# Patient Record
Sex: FEMALE | Race: WHITE | NOT HISPANIC OR LATINO | Employment: FULL TIME | URBAN - METROPOLITAN AREA
[De-identification: names, ages, dates, MRNs, and addresses within clinical notes are randomized per-mention and may not be internally consistent; named-entity substitution may affect disease eponyms.]

---

## 2019-01-23 NOTE — PROGRESS NOTES
SUBJECTIVE:   Edda Schmidt is a 39 year old female who presents to clinic today for the following health issues:    Patient presents to clinic to Establish Care. Patient has had influenza vaccine through work at [employer].     History of Present Illness     Diet:  Regular (no restrictions)  Frequency of exercise:  4-5 days/week  Duration of exercise:  15-30 minutes  Taking medications regularly:  Yes  Medication side effects:  None  Additional concerns today:  Yes    Joint Pain    Onset: one month    Description:   Location: left knee  Character: Sharp, Dull ache and Stabbing    Intensity: moderate    Progression of Symptoms: worse    Accompanying Signs & Symptoms:  Other symptoms: none    History:   Previous similar pain: no       Precipitating factors:   Trauma or overuse: YES    Alleviating factors:  Improved by: rest/inactivity, heat, ice and NSAID -     Therapies Tried and outcome: NSAID, Heat, Rest. Nothing is helping.     Asthma  She typically does not take her Flovent daily, and has not taken her rescue inhaler in years. She really only has symptoms when it is cold and she is exercising.     Sciatica  Edda reports having mild left sciatica pain for which she takes flexeril.     Problem list and histories reviewed & adjusted, as indicated.  Additional history: as documented    Patient Active Problem List   Diagnosis     Asthma     Menstrual migraine     Left-sided low back pain with left-sided sciatica     Car sickness     No past surgical history on file.    Social History     Tobacco Use     Smoking status: Never Smoker     Smokeless tobacco: Never Used   Substance Use Topics     Alcohol use: Yes     Frequency: 2-3 times a week     Drinks per session: 1 or 2     Binge frequency: Never     No family history on file.        ROS:  Constitutional, HEENT, cardiovascular, pulmonary, GI, , musculoskeletal, neuro, skin, endocrine and psych systems are negative, except as in HPI or otherwise noted.     This  "document serves as a record of the services and decisions personally performed and made by Rosetta Nicole MD. It was created on her behalf by Pardeep Garcia, a trained medical scribe. The creation of this document is based the provider's statements to the medical scribe.  Pardeep Garcia, January 31, 2019 9:54 AM    OBJECTIVE:                                                    /68 (BP Location: Right arm, Patient Position: Chair, Cuff Size: Adult Large)   Pulse 52   Temp 97.8  F (36.6  C)   Resp 16   Ht 1.689 m (5' 6.5\")   Wt 103.4 kg (228 lb)   LMP 01/20/2019 (Exact Date)   SpO2 100%   Breastfeeding? No   BMI 36.25 kg/m    Body mass index is 36.25 kg/m .   GENERAL: healthy, alert, well nourished, well hydrated, no distress, obese  HENT: ear canals- normal; TMs- normal; Nose- normal; Mouth- no ulcers, no lesions  NECK: no tenderness, no adenopathy, no asymmetry, no masses, no stiffness; thyroid- normal to palpation  RESP: lungs clear to auscultation - no rales, no rhonchi, no wheezes  CV: regular rates and rhythm, normal S1 S2, no S3 or S4 and no murmur, no click or rub -  ABDOMEN: soft, no tenderness, no  hepatosplenomegaly, no masses, normal bowel sounds  SKIN: no suspicious lesions, no rashes to visible skin  PSYCH: Alert and oriented times 3; speech- coherent , normal rate and volume; able to articulate logical thoughts, able to abstract reason, no tangential thoughts, no hallucinations or delusions, affect- normal  : normal cervix, adnexae, and uterus without masses or discharge, weakened pelvic muscles noted  Left Knee Exam     Comments:  Lateral and medial grinding  Hamstring tightness        Diagnostic test results:  No results found for this or any previous visit (from the past 24 hour(s)).     ASSESSMENT/PLAN:                                                        ICD-10-CM    1. Encounter for routine adult health examination without abnormal findings Z00.00    2. Menstrual migraine without " status migrainosus, not intractable G43.829    3. Left-sided low back pain with left-sided sciatica, unspecified chronicity M54.42 cyclobenzaprine (FLEXERIL) 10 MG tablet   4. Car sickness, subsequent encounter T75.3XXD ondansetron (ZOFRAN) 8 MG tablet   5. Acute pain of left knee M25.562 XR Knee Left 1/2 Views     PHYSICAL THERAPY REFERRAL   6. Urge incontinence of urine N39.41 PHYSICAL THERAPY REFERRAL   7. Need for prophylactic vaccination and inoculation against influenza Z23    8. Need for vaccination Z23 TDAP VACCINE (ADACEL) [05475.002]   9. Screening for HIV (human immunodeficiency virus) Z11.4    10. Screening for malignant neoplasm of cervix Z12.4 HPV High Risk Types DNA Cervical     Pap imaged thin layer screen with HPV - recommended age 30 - 65 years (select HPV order below)     Joint Pain:  Referral given to PT to help strengthen hamstring and stabilize knee. X-ray obtained today which showed normal. Discussed weight loss methods and recommended low impact activities given knee pains, suggested forward and backwards elliptical exercises to strengthen hamstrings as well.     Urinary incontinence:  Referral given to PT to strengthen Pelvic muscles.     Sciatica:  Pt doing well on current medication and treatment plan. No change at this time. Refilled Flexeril prescription today.     Motion Sickness:  Pt doing well on current medication and treatment plan. No change at this time. Discussed methods to help manage motion sickness along with medication. Refilled prescriptions today.    Asthma:  Removed Flovent from medication list, as she has not used it and does not need it. She hasn't used any inhaler in years and is well controlled. Does not even want a rescue filled as she hasn't needed. Given ACT today to keep on hand for phone updates as needed for quality metrics.    Health Maintenance:  Papanicolaou smear performed with the patient's informed consent. Cervical swab was collected and sent to the lab  for stain analysis. The patient will be notified results of this test.    The information in this document, created by the medical scribe for me, accurately reflects the services I personally performed and the decisions made by me. I have reviewed and approved this document for accuracy.   MD Rosetta Sparks MD, MD  [facility]

## 2019-01-31 ENCOUNTER — OFFICE VISIT (OUTPATIENT)
Dept: FAMILY MEDICINE | Facility: OTHER | Age: 39
End: 2019-01-31
Payer: COMMERCIAL

## 2019-01-31 ENCOUNTER — ANCILLARY PROCEDURE (OUTPATIENT)
Dept: GENERAL RADIOLOGY | Facility: OTHER | Age: 39
End: 2019-01-31
Payer: COMMERCIAL

## 2019-01-31 ENCOUNTER — TELEPHONE (OUTPATIENT)
Dept: FAMILY MEDICINE | Facility: OTHER | Age: 39
End: 2019-01-31

## 2019-01-31 VITALS
WEIGHT: 228 LBS | SYSTOLIC BLOOD PRESSURE: 104 MMHG | RESPIRATION RATE: 16 BRPM | OXYGEN SATURATION: 100 % | HEART RATE: 52 BPM | DIASTOLIC BLOOD PRESSURE: 68 MMHG | TEMPERATURE: 97.8 F | BODY MASS INDEX: 35.79 KG/M2 | HEIGHT: 67 IN

## 2019-01-31 DIAGNOSIS — M54.42 LEFT-SIDED LOW BACK PAIN WITH LEFT-SIDED SCIATICA, UNSPECIFIED CHRONICITY: ICD-10-CM

## 2019-01-31 DIAGNOSIS — G43.829 MENSTRUAL MIGRAINE WITHOUT STATUS MIGRAINOSUS, NOT INTRACTABLE: ICD-10-CM

## 2019-01-31 DIAGNOSIS — Z00.00 ENCOUNTER FOR ROUTINE ADULT HEALTH EXAMINATION WITHOUT ABNORMAL FINDINGS: Primary | ICD-10-CM

## 2019-01-31 DIAGNOSIS — N39.41 URGE INCONTINENCE OF URINE: ICD-10-CM

## 2019-01-31 DIAGNOSIS — M25.562 ACUTE PAIN OF LEFT KNEE: ICD-10-CM

## 2019-01-31 DIAGNOSIS — T75.3XXD CAR SICKNESS, SUBSEQUENT ENCOUNTER: ICD-10-CM

## 2019-01-31 DIAGNOSIS — Z12.4 SCREENING FOR MALIGNANT NEOPLASM OF CERVIX: ICD-10-CM

## 2019-01-31 DIAGNOSIS — Z11.4 SCREENING FOR HIV (HUMAN IMMUNODEFICIENCY VIRUS): ICD-10-CM

## 2019-01-31 DIAGNOSIS — Z23 NEED FOR VACCINATION: ICD-10-CM

## 2019-01-31 DIAGNOSIS — Z23 NEED FOR PROPHYLACTIC VACCINATION AND INOCULATION AGAINST INFLUENZA: ICD-10-CM

## 2019-01-31 PROBLEM — T75.3XXA CAR SICKNESS: Status: ACTIVE | Noted: 2019-01-31

## 2019-01-31 PROCEDURE — G0145 SCR C/V CYTO,THINLAYER,RESCR: HCPCS | Performed by: FAMILY MEDICINE

## 2019-01-31 PROCEDURE — 99395 PREV VISIT EST AGE 18-39: CPT | Mod: 25 | Performed by: FAMILY MEDICINE

## 2019-01-31 PROCEDURE — 73560 X-RAY EXAM OF KNEE 1 OR 2: CPT | Mod: LT

## 2019-01-31 PROCEDURE — 90471 IMMUNIZATION ADMIN: CPT | Performed by: FAMILY MEDICINE

## 2019-01-31 PROCEDURE — 90715 TDAP VACCINE 7 YRS/> IM: CPT | Performed by: FAMILY MEDICINE

## 2019-01-31 PROCEDURE — 99213 OFFICE O/P EST LOW 20 MIN: CPT | Mod: 25 | Performed by: FAMILY MEDICINE

## 2019-01-31 PROCEDURE — 87624 HPV HI-RISK TYP POOLED RSLT: CPT | Performed by: FAMILY MEDICINE

## 2019-01-31 RX ORDER — CYCLOBENZAPRINE HCL 10 MG
10 TABLET ORAL PRN
COMMUNITY
Start: 2018-07-16 | End: 2019-01-31

## 2019-01-31 RX ORDER — FLUTICASONE PROPIONATE 220 UG/1
2 AEROSOL, METERED RESPIRATORY (INHALATION) PRN
COMMUNITY
Start: 2004-02-10 | End: 2019-01-31

## 2019-01-31 RX ORDER — BUTALBITAL, ACETAMINOPHEN AND CAFFEINE 50; 325; 40 MG/1; MG/1; MG/1
1 TABLET ORAL EVERY 6 HOURS PRN
COMMUNITY
Start: 2016-07-26 | End: 2021-03-18

## 2019-01-31 RX ORDER — ALBUTEROL SULFATE 90 UG/1
2 AEROSOL, METERED RESPIRATORY (INHALATION) PRN
COMMUNITY
Start: 2012-05-17 | End: 2019-04-03

## 2019-01-31 RX ORDER — ONDANSETRON 8 MG/1
8 TABLET, FILM COATED ORAL PRN
Qty: 30 TABLET | Refills: 1 | Status: SHIPPED | OUTPATIENT
Start: 2019-01-31 | End: 2019-02-04

## 2019-01-31 RX ORDER — CYCLOBENZAPRINE HCL 10 MG
10 TABLET ORAL PRN
Qty: 30 TABLET | Refills: 1 | Status: SHIPPED | OUTPATIENT
Start: 2019-01-31 | End: 2019-02-04

## 2019-01-31 RX ORDER — ONDANSETRON 8 MG/1
8 TABLET, FILM COATED ORAL PRN
COMMUNITY
Start: 2018-06-28 | End: 2019-01-31

## 2019-01-31 SDOH — HEALTH STABILITY: MENTAL HEALTH: HOW OFTEN DO YOU HAVE SIX OR MORE DRINKS ON ONE OCCASION?: NEVER

## 2019-01-31 SDOH — HEALTH STABILITY: MENTAL HEALTH: HOW MANY DRINKS CONTAINING ALCOHOL DO YOU HAVE ON A TYPICAL DAY WHEN YOU ARE DRINKING?: 1 OR 2

## 2019-01-31 SDOH — HEALTH STABILITY: MENTAL HEALTH: HOW OFTEN DO YOU HAVE A DRINK CONTAINING ALCOHOL?: 2-3 TIMES A WEEK

## 2019-01-31 SDOH — HEALTH STABILITY: MENTAL HEALTH: HOW MANY STANDARD DRINKS CONTAINING ALCOHOL DO YOU HAVE ON A TYPICAL DAY?: 1 OR 2

## 2019-01-31 SDOH — HEALTH STABILITY: MENTAL HEALTH: HOW OFTEN DO YOU HAVE 6 OR MORE DRINKS ON ONE OCCASION?: NEVER

## 2019-01-31 ASSESSMENT — MIFFLIN-ST. JEOR: SCORE: 1733.89

## 2019-01-31 NOTE — LETTER
February 7, 2019    Edda Schmidt  [id number] [address] [place] [id number]    Dear ,  This letter is regarding your recent Pap smear (cervical cancer screening) and Human Papillomavirus (HPV) test.  We are happy to inform you that your Pap smear result is normal. Cervical cancer is closely linked with certain types of HPV. Your results showed no evidence of high-risk HPV.  Therefore we recommend you return in 5 years for your next pap smear and HPV test.  You will still need to return to the clinic every year for an annual exam and other preventive tests.  If you have additional questions regarding this result, please call our registered nurse, Leatha at [phone].  Sincerely,    Rosetta Nicole MD/vivian

## 2019-02-01 ASSESSMENT — ASTHMA QUESTIONNAIRES: ACT_TOTALSCORE: 23

## 2019-02-04 LAB
COPATH REPORT: NORMAL
PAP: NORMAL

## 2019-02-04 RX ORDER — ONDANSETRON 8 MG/1
8 TABLET, FILM COATED ORAL EVERY 8 HOURS PRN
Qty: 30 TABLET | Refills: 1 | Status: SHIPPED | OUTPATIENT
Start: 2019-02-04 | End: 2019-11-27

## 2019-02-04 RX ORDER — CYCLOBENZAPRINE HCL 10 MG
10 TABLET ORAL 3 TIMES DAILY PRN
Qty: 30 TABLET | Refills: 1 | Status: SHIPPED | OUTPATIENT
Start: 2019-02-04 | End: 2019-11-27

## 2019-02-06 LAB
FINAL DIAGNOSIS: NORMAL
HPV HR 12 DNA CVX QL NAA+PROBE: NEGATIVE
HPV16 DNA SPEC QL NAA+PROBE: NEGATIVE
HPV18 DNA SPEC QL NAA+PROBE: NEGATIVE
SPECIMEN DESCRIPTION: NORMAL
SPECIMEN SOURCE CVX/VAG CYTO: NORMAL

## 2019-03-13 ENCOUNTER — TELEPHONE (OUTPATIENT)
Dept: FAMILY MEDICINE | Facility: OTHER | Age: 39
End: 2019-03-13

## 2019-03-13 NOTE — TELEPHONE ENCOUNTER
Will route to AE to review/ place order if appropriate.  Patient requesting an MRI prior to starting Physical Therapy.  Patient had an x-ray on 1/31/19- negative.  Chiqui Morataya CMA ([facility])

## 2019-03-13 NOTE — TELEPHONE ENCOUNTER
Patient informed. She had no questions at this time.   Cheri Cummings, [facility]

## 2019-03-13 NOTE — TELEPHONE ENCOUNTER
Reason for Call: Request for an order or referral:    Order or referral being requested: MRI of left knee    Date needed: as soon as possible    Has the patient been seen by the PCP for this problem? YES    Additional comments: patient would like to get this done before she starts PT    Phone number Patient can be reached at:  Cell number on file:    Telephone Information:   Mobile [phone]       Best Time:      Can we leave a detailed message on this number?  NO    Call taken on 3/13/2019 at 10:56 AM by Sheri Brito

## 2019-03-13 NOTE — TELEPHONE ENCOUNTER
Xray completed and MRI not indicated per exam.. She can get second opinion or come back to discuss if she would like.  Rosetta Nicole MD

## 2019-03-25 ENCOUNTER — HOSPITAL ENCOUNTER (OUTPATIENT)
Dept: PHYSICAL THERAPY | Facility: CLINIC | Age: 39
Setting detail: THERAPIES SERIES
End: 2019-03-25
Attending: FAMILY MEDICINE
Payer: COMMERCIAL

## 2019-03-25 PROCEDURE — 97161 PT EVAL LOW COMPLEX 20 MIN: CPT | Mod: GP | Performed by: PHYSICAL THERAPIST

## 2019-03-25 PROCEDURE — 97110 THERAPEUTIC EXERCISES: CPT | Mod: GP | Performed by: PHYSICAL THERAPIST

## 2019-03-25 NOTE — PROGRESS NOTES
" 03/25/19 1314   General Information   Type of Visit Initial OP Ortho PT Evaluation   Start of Care Date 03/25/19   Referring Physician Rosetta Nicole MD    Patient/Family Goals Statement The pt  would like to not have pain when riding, squatting, and bending down with working.    Orders Evaluate and Treat   Orders Comment L knee pain and hamstring strengthening needed   Date of Order 01/31/19   Insurance Type (PREFERRED ONE HMO)   Medical Diagnosis Acute pain of left knee M25.562   Surgical/Medical history reviewed Yes   Precautions/Limitations no known precautions/limitations   Weight-Bearing Status - LUE full weight-bearing   Weight-Bearing Status - RUE full weight-bearing   Weight-Bearing Status - LLE full weight-bearing   Weight-Bearing Status - RLE full weight-bearing   Special Instructions L knee pain and hamstring strengthening needed       Present No   Body Part(s)   Body Part(s) Knee   Presentation and Etiology   Pertinent history of current problem (include personal factors and/or comorbidities that impact the POC) 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 She brings herself up in the saddle frequently, which has been causing bilateral knee pain L>R. She reports her pain began approximately a year ago and has been getting worse with her longer trail rides. She lowered her stirrups where she does not have to bend her knees as much. About 20-25 miles into her ride she has pain with extending her knees bilaterally. Pain is right across the front of her knee. The last few months her left anteromedial left knee has been painful when she is squatting and bending her knee to end range. She rides horses 2-3 hours 2-3x a week. With every second step of the horse she will \"post\" as in raise her glutes off of saddle.  When riding her pain gets up to a 6-7/10. The longer the ride the more discomfort she has bilateral knees. Minimal pain with stairs, primarily with going down stairs. " Pt works as a nurse at [employer]. PMHx: L side sciatica, urinary incontinence, asthma   Impairments A. Pain;D. Decreased ROM;E. Decreased flexibility   Functional Limitations perform activities of daily living;perform required work activities   Symptom Location anteromedial left knee and bilateral anterior joint line.   How/Where did it occur With repetition/overuse   Onset date of current episode/exacerbation 03/25/18   Chronicity Chronic   Pain rating (0-10 point scale) Best (/10);Worst (/10);Other   Best (/10) 0/10   Worst (/10) 7/10   Pain rating comment current:1/10   Pain quality C. Aching;A. Sharp;B. Dull   Frequency of pain/symptoms C. With activity   Pain/symptoms are: (during activity)   Pain/symptoms exacerbated by (horse riding, squatting)   Pain/symptoms eased by E. Changing positions;J. Braces/supports;H. Cold;I. OTC medication(s)  (ibuprofen, Aleve)   Progression of symptoms since onset: Unchanged   Current / Previous Interventions   Diagnostic Tests: X-ray   X-ray Results Results   X-ray results unremarkable   Prior Level of Function   Prior Level of Function-Mobility IND   Prior Level of Function-ADLs IND   Current Level of Function   Current Community Support Family/friend caregiver   Patient role/employment history A. 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 No   Have you fallen and had an injury in the past year? No   Is patient a fall risk? No   Abuse Screen (yes response referral indicated)   Feels Unsafe at Home or Work/School no   System Outcome Measures   Outcome Measures (LEFS)   Knee Objective Findings   Side (if bilateral, select both right and left) Right;Left    Observation inferior and lateral tilted patellae bilaterally, slight bilateral knee valgus in standing   Posture ASIS and medial malleolus aligned in supine   Gait/Locomotion slightly decreased stance time on L LE   Knee/Hip Strength Comments hip flexion: 4+/5 B   Gretel's Test + on L, - on R   Knee Special Test Comments MARTIN and FADER + on L, L hip scour +   Palpation tenderness to palpation of B ITB, glute med, gastroc muscle bellies, lateral HS tendon attachments, and medial and lateral joint lines bilaterally.   Accessory Motion/Joint Mobility reduced medial patellar mobility bilaterally   Right Knee Extension AROM 15 degrees hyperextension   Right Knee Flexion AROM 120 degrees   Right Knee Flexion Strength 5/5   Right Knee Extension Strength 5/5   Right Hip Abduction Strength 4/5, pain    Right Quad Set Strength 5/5   R VMO Strength fair   Right Gastrocnemius Flexibility 20+ degrees DF    Right Hamstring Flexibility 90/90: 35 degrees form straight   Right Hip Flexor Flexibility demonstrates tightness   Right Quadricep Flexibility positive ely's   Right ITB Flexibility demonstrates tightness, positive ward's   Left Knee Extension AROM 121 degrees    Left Knee Flexion AROM 12 degrees hyperextension   Left Knee Flexion Strength 5/5   Left Knee Extension Strength 5/5   Left Hip Abduction Strength 4/5, pain    Left Quad Set Strength 5/5   L VMO Strength fair   Left Gastrocnemius Flexibility 20+ degrees DF    Left Hamstring Flexibility 90/90: 30 degrees form straight   Left Hip Flexor Flexibility demonstrates tightness   Left Quadricep Flexibility positive ely's   Left ITB Flexibility demonstrates tightness, positive ward's   Planned Therapy Interventions   Planned Therapy Interventions balance training;joint mobilization;gait training;manual therapy;neuromuscular re-education;ROM;strengthening;stretching   Planned Modality Interventions   Planned Modality Interventions Cryotherapy  (as needed for symptom  management)   Clinical Impression   Criteria for Skilled Therapeutic Interventions Met yes, treatment indicated   PT Diagnosis Pt presents with pain, decreased ROM, decreased strength, decreased flexibility, and impaired gait   Influenced by the following impairments pain, repetition/overuse, lateral and inferior tilted patellae B, slight knee valgus B   Functional limitations due to impairments squatting, stairs, riding horses   Clinical Presentation Stable/Uncomplicated   Clinical Presentation Rationale Pt is a 39 year old female that presents to physical therapy with muscle length and strength imbalance of bilateral lower extremities, and inferior/laterally tilted patellae, which is contributing to her knee pain. Pt demonstrates increased pain with squatting, navigating stairs, and with repetitive knee extensions movements while competitively riding horses. Pt's symptoms have been increasingly getting worse for over a year and will benefit from skilled physical therapy to address impairments listed above and to allow her to return to prior level of function with minimal pain.   Clinical Decision Making (Complexity) Low complexity   Therapy Frequency 1 time/week   Predicted Duration of Therapy Intervention (days/wks) 10 weeks   Risk & Benefits of therapy have been explained Yes   Patient, Family & other staff in agreement with plan of care Yes   Education Assessment   Preferred Learning Style Listening;Reading;Demonstration;Pictures/video   Barriers to Learning No barriers   ORTHO GOALS   PT Ortho Eval Goals 1;2;3   Ortho Goal 1   Goal Identifier LEFS   Goal Description Pt will score no greater than 34/80 on LEFS to demonstrate reduced pain and improved functional mobility.   Target Date 06/02/19   Ortho Goal 2   Goal Identifier pain   Goal Description Pt will be able to ride horses for 3 hours, squat, and navigate stairs with no greater than 3/10 discomfort in B LEs.   Target Date 06/02/19   Ortho Goal 3   Goal  Identifier HEP   Goal Description Pt will consistently perform HEP 4x a week to address pain, strength, flexibility, and ROM to allow her to return to prior level of function.   Target Date 06/02/19   Total Evaluation Time   PT Otoniel, Low Complexity Minutes ([id number]) 40     Thank you for your referral.    Payton Fam, PT, DTP  [facility]  [phone]

## 2019-03-29 ENCOUNTER — MYC MEDICAL ADVICE (OUTPATIENT)
Dept: FAMILY MEDICINE | Facility: OTHER | Age: 39
End: 2019-03-29

## 2019-03-29 DIAGNOSIS — M25.562 ACUTE PAIN OF LEFT KNEE: Primary | ICD-10-CM

## 2019-03-29 NOTE — TELEPHONE ENCOUNTER
Please call patient.     MRI would help to determine if there is truly a tear in the Mensicus or other damage.   If knee pain persisted would recommend further assessment and could include MRI.    Patient could also see Ortho for evaluation.     Funmi Hector PA-C

## 2019-03-29 NOTE — TELEPHONE ENCOUNTER
Attempted to contact patient - no answer/fast busy signal. [employer] message sent.  Cheri Cummings CMA

## 2019-03-29 NOTE — TELEPHONE ENCOUNTER
Per ES message I think she was recommending OV with FP or Ortho to evaluate if an MRI is needed. At this point I would recommend orthopedics.     SOURAV Beckham CNP

## 2019-03-29 NOTE — TELEPHONE ENCOUNTER
Please call patient on Monday and let her know we put in an ortho  and they will call her to schedule.     SOURAV Beckham CNP

## 2019-04-01 NOTE — TELEPHONE ENCOUNTER
Tried contacting patient received recording stating voicemail was full so unable to leave a message.

## 2019-04-01 NOTE — TELEPHONE ENCOUNTER
Pt returned call, states she would like to avoid additional clinic visits if possible. If FP deems MRI is appropriate, she would like to forego ortho. Please call to advise.

## 2019-04-01 NOTE — TELEPHONE ENCOUNTER
Please advise ortho appointment as KV and ES also advised this. MRI is very expensive and is considered advanced imaging. It is recommend she see a specialist (ortho) to determine if the MRI is even necessary.     Mekhi Gordon PA-C  [facility]

## 2019-04-02 ENCOUNTER — HOSPITAL ENCOUNTER (OUTPATIENT)
Dept: PHYSICAL THERAPY | Facility: CLINIC | Age: 39
Setting detail: THERAPIES SERIES
End: 2019-04-02
Attending: FAMILY MEDICINE
Payer: COMMERCIAL

## 2019-04-02 PROCEDURE — 97110 THERAPEUTIC EXERCISES: CPT | Mod: GP | Performed by: PHYSICAL THERAPIST

## 2019-04-03 ENCOUNTER — OFFICE VISIT (OUTPATIENT)
Dept: ORTHOPEDICS | Facility: CLINIC | Age: 39
End: 2019-04-03
Payer: COMMERCIAL

## 2019-04-03 VITALS
HEIGHT: 67 IN | DIASTOLIC BLOOD PRESSURE: 79 MMHG | BODY MASS INDEX: 35.79 KG/M2 | SYSTOLIC BLOOD PRESSURE: 121 MMHG | HEART RATE: 63 BPM | WEIGHT: 228 LBS

## 2019-04-03 DIAGNOSIS — M22.2X2 PATELLOFEMORAL PAIN SYNDROME OF BOTH KNEES: Primary | ICD-10-CM

## 2019-04-03 DIAGNOSIS — M22.2X1 PATELLOFEMORAL PAIN SYNDROME OF BOTH KNEES: Primary | ICD-10-CM

## 2019-04-03 PROCEDURE — 99214 OFFICE O/P EST MOD 30 MIN: CPT | Performed by: PREVENTIVE MEDICINE

## 2019-04-03 RX ORDER — DICLOFENAC SODIUM 75 MG/1
75 TABLET, DELAYED RELEASE ORAL 2 TIMES DAILY
Qty: 40 TABLET | Refills: 1 | Status: SHIPPED | OUTPATIENT
Start: 2019-04-03 | End: 2019-11-27

## 2019-04-03 ASSESSMENT — MIFFLIN-ST. JEOR: SCORE: 1733.89

## 2019-04-03 ASSESSMENT — PAIN SCALES - GENERAL: PAINLEVEL: NO PAIN (1)

## 2019-04-03 NOTE — NURSING NOTE
"NEW PATIENT INTAKE QUESTIONNAIRE  [facility] 4/3/2019      Eddapiyush Hummelspenser's chief complaint for this visit includes:  Chief Complaint   Patient presents with     Knee Pain     Left knee pain which started a couple of months ago.      PCP: No Ref-Primary, Physician    Referring Provider:  SOURAV [facility]  [address]  [place] [address]    /79   Pulse 63   Ht 1.689 m (5' 6.5\")   Wt 103.4 kg (228 lb)   BMI 36.25 kg/m    No Pain (1)       Reason for Visit:    What part of your body is injured / painful?  left knee    What caused the injury /pain? No inciting event     How long ago did your injury occur or pain begin? several months ago    What are your most bothersome symptoms? Pain    How would you characterize your symptom? dull    What makes your symptoms better? Rest    What makes your symptoms worse? Walking    Have you been previously seen for this problem? No    Medical History:    Medical History: See chart    Have you had surgery on this body part before? No    Medications: See chart    Allergies: No known drug allergies    Review of Systems:    Have you recently had a a fever, chills, weight loss? No    Do you have any vision problems? No    Do you have any chest pain or edema? No    Do you have any shortness of breath or wheezing?  No    Do you have stomach problems? No    Do you have any numbness or focal weakness? No    Do you have diabetes? No    Do you have problems with bleeding or clotting? No    Do you have an rashes or other skin lesions? No      "

## 2019-04-03 NOTE — PROGRESS NOTES
"HISTORY OF PRESENT ILLNESS  Ms. 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  Pain in knees is worse with more horseback riding  Location: bilateral knees  Quality:  achy pain    Severity: 7/10 at worst    Duration: 1 year  Timing: occurs intermittently  Context: occurs while exercising and lifting  Modifying factors:  resting and non-use makes it better, movement and use makes it worse  Associated signs & symptoms: swelling in knee at times  Previous similar pain: yes    Additional history: as documented    MEDICAL HISTORY  Patient Active Problem List   Diagnosis     Asthma     Menstrual migraine     Left-sided low back pain with left-sided sciatica     Car sickness     Cervical cancer screening       Current Outpatient Medications   Medication Sig Dispense Refill     butalbital-acetaminophen-caffeine (FIORICET/ESGIC) -40 MG tablet Take 1 tablet by mouth every 6 hours as needed       cyclobenzaprine (FLEXERIL) 10 MG tablet Take 1 tablet (10 mg) by mouth 3 times daily as needed for muscle spasms 30 tablet 1     ondansetron (ZOFRAN) 8 MG tablet Take 1 tablet (8 mg) by mouth every 8 hours as needed for nausea 30 tablet 1       No Known Allergies    No family history on file.    Additional medical/Social/Surgical histories reviewed in [employer] and updated as appropriate.     REVIEW OF SYSTEMS (4/3/2019)  10 point ROS of systems including Constitutional, Eyes, Respiratory, Cardiovascular, Gastroenterology, Genitourinary, Integumentary, Musculoskeletal, Psychiatric were all negative except for pertinent positives noted in my HPI.     PHYSICAL EXAM  Vitals:    04/03/19 1354   BP: 121/79   Pulse: 63   Weight: 103.4 kg (228 lb)   Height: 1.689 m (5' 6.5\")     Vital Signs: /79   Pulse 63   Ht 1.689 m (5' 6.5\")   Wt " 103.4 kg (228 lb)   BMI 36.25 kg/m   Patient declined being weighed. Body mass index is 36.25 kg/m .    General  - normal appearance, in no obvious distress  CV  - normal popliteal pulse  Pulm  - normal respiratory pattern, non-labored  Musculoskeletal -bilateral knee  - stance: normal gait without limp, no obvious leg length discrepancy, single-leg squat exhibits knee valgus, internal rotation of the hip, contralateral hip drop  - inspection: no swelling or effusion, normal muscle tone, normal bone and joint alignment, no obvious deformity  - palpation: no joint line tenderness, patellar tendon non-tender, tender medial patellar facet  - ROM: 135 degrees flexion, -5 degrees extension, not painful, crepitus with weight-bearing flexion  - strength: 5/5 in flexion, 5/5 in extension  - neuro: no sensory or motor deficit  - special tests:  (-) Lachman  (-) anterior drawer  (-) Gretel  (-) Thessaly  (-) varus at 0 and 30 degrees flexion  (-) valgus at 0 and 30 degrees flexion  (+) Zeke s compression test  (+) patellar grind  (-) patellar apprehension  Neuro  - no sensory or motor deficit, grossly normal coordination, normal muscle tone  Skin  - no ecchymosis, erythema, warmth, or induration, no obvious rash  Psych  - interactive, appropriate, normal mood and affect  Low bacK : has some left knee pain with extension, and stiffness in low back  Tight hamstrings worsen with flexion    ASSESSMENT & PLAN  38 yo female with lumbar radicular pain and bilateral PF arthritis pain  Reviewed left knee xray: shows arthritis medially and PF joint  Consider injections  Given HEP  Cont. PT  Start velma Briggs MD, CAQSM

## 2019-04-03 NOTE — PATIENT INSTRUCTIONS
Thanks for coming today.  Ortho/[facility] Medicine Clinic  [address] [place] [address]    To schedule future appointments in Ortho Clinic, you may call [phone].    To schedule ordered imaging by your provider:   Call Central Imaging [phone]    To schedule an injection ordered by your provider:  Call Central Imaging Injection scheduling line: [phone]  [employer]hart available online at:  [email or web address]    Please call if any further questions or concerns ([phone]).  Clinic hours 8 am to 5 pm.    Return to clinic (call) if symptoms worsen or fail to improve.

## 2019-04-03 NOTE — LETTER
4/3/2019         RE: Edda Schmidt  [id number] [address] [place] [address]        Dear Colleague,    Thank you for referring your patient, Edda Schmidt, to the [facility]. Please see a copy of my visit note below.    HISTORY OF PRESENT ILLNESS  Ms. 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  Pain in knees is worse with more horseback riding  Location: bilateral knees  Quality:  achy pain    Severity: 7/10 at worst    Duration: 1 year  Timing: occurs intermittently  Context: occurs while exercising and lifting  Modifying factors:  resting and non-use makes it better, movement and use makes it worse  Associated signs & symptoms: swelling in knee at times  Previous similar pain: yes    Additional history: as documented    MEDICAL HISTORY  Patient Active Problem List   Diagnosis     Asthma     Menstrual migraine     Left-sided low back pain with left-sided sciatica     Car sickness     Cervical cancer screening       Current Outpatient Medications   Medication Sig Dispense Refill     butalbital-acetaminophen-caffeine (FIORICET/ESGIC) -40 MG tablet Take 1 tablet by mouth every 6 hours as needed       cyclobenzaprine (FLEXERIL) 10 MG tablet Take 1 tablet (10 mg) by mouth 3 times daily as needed for muscle spasms 30 tablet 1     ondansetron (ZOFRAN) 8 MG tablet Take 1 tablet (8 mg) by mouth every 8 hours as needed for nausea 30 tablet 1       No Known Allergies    No family history on file.    Additional medical/Social/Surgical histories reviewed in [facility] and updated as appropriate.     REVIEW OF SYSTEMS (4/3/2019)  10 point ROS of systems including Constitutional, Eyes, Respiratory, Cardiovascular, Gastroenterology, Genitourinary, Integumentary, Musculoskeletal, Psychiatric were all negative except for  "pertinent positives noted in my HPI.     PHYSICAL EXAM  Vitals:    04/03/19 1354   BP: 121/79   Pulse: 63   Weight: 103.4 kg (228 lb)   Height: 1.689 m (5' 6.5\")     Vital Signs: /79   Pulse 63   Ht 1.689 m (5' 6.5\")   Wt 103.4 kg (228 lb)   BMI 36.25 kg/m    Patient declined being weighed. Body mass index is 36.25 kg/m .    General  - normal appearance, in no obvious distress  CV  - normal popliteal pulse  Pulm  - normal respiratory pattern, non-labored  Musculoskeletal -bilateral knee  - stance: normal gait without limp, no obvious leg length discrepancy, single-leg squat exhibits knee valgus, internal rotation of the hip, contralateral hip drop  - inspection: no swelling or effusion, normal muscle tone, normal bone and joint alignment, no obvious deformity  - palpation: no joint line tenderness, patellar tendon non-tender, tender medial patellar facet  - ROM: 135 degrees flexion, -5 degrees extension, not painful, crepitus with weight-bearing flexion  - strength: 5/5 in flexion, 5/5 in extension  - neuro: no sensory or motor deficit  - special tests:  (-) Lachman  (-) anterior drawer  (-) Gretel  (-) Thessaly  (-) varus at 0 and 30 degrees flexion  (-) valgus at 0 and 30 degrees flexion  (+) Zeke s compression test  (+) patellar grind  (-) patellar apprehension  Neuro  - no sensory or motor deficit, grossly normal coordination, normal muscle tone  Skin  - no ecchymosis, erythema, warmth, or induration, no obvious rash  Psych  - interactive, appropriate, normal mood and affect  Low bacK : has some left knee pain with extension, and stiffness in low back  Tight hamstrings worsen with flexion    ASSESSMENT & PLAN  40 yo female with lumbar radicular pain and bilateral PF arthritis pain  Reviewed left knee xray: shows arthritis medially and PF joint  Consider injections  Given HEP  Cont. PT  Start velma Briggs MD, CAQSM    Again, thank you for allowing me to participate in the care of " your patient.        Sincerely,        Nikhil Briggs MD

## 2019-04-09 DIAGNOSIS — M54.16 LUMBAR RADICULAR PAIN: Primary | ICD-10-CM

## 2019-04-11 ENCOUNTER — HOSPITAL ENCOUNTER (OUTPATIENT)
Dept: MRI IMAGING | Facility: CLINIC | Age: 39
Discharge: HOME | End: 2019-04-11
Attending: PREVENTIVE MEDICINE | Admitting: PREVENTIVE MEDICINE
Payer: COMMERCIAL

## 2019-04-11 DIAGNOSIS — M54.16 LUMBAR RADICULAR PAIN: ICD-10-CM

## 2019-04-11 PROCEDURE — 72148 MRI LUMBAR SPINE W/O DYE: CPT

## 2019-04-18 ENCOUNTER — TELEPHONE (OUTPATIENT)
Dept: SURGERY | Facility: CLINIC | Age: 39
End: 2019-04-18

## 2019-04-18 ENCOUNTER — MYC MEDICAL ADVICE (OUTPATIENT)
Dept: FAMILY MEDICINE | Facility: OTHER | Age: 39
End: 2019-04-18

## 2019-04-18 ENCOUNTER — OFFICE VISIT (OUTPATIENT)
Dept: ORTHOPEDICS | Facility: CLINIC | Age: 39
End: 2019-04-18
Payer: COMMERCIAL

## 2019-04-18 VITALS — DIASTOLIC BLOOD PRESSURE: 73 MMHG | SYSTOLIC BLOOD PRESSURE: 128 MMHG | OXYGEN SATURATION: 69 %

## 2019-04-18 DIAGNOSIS — M51.16 LUMBAR DISC HERNIATION WITH RADICULOPATHY: Primary | ICD-10-CM

## 2019-04-18 PROCEDURE — 99214 OFFICE O/P EST MOD 30 MIN: CPT | Performed by: PREVENTIVE MEDICINE

## 2019-04-18 RX ORDER — METHYLPREDNISOLONE 4 MG
TABLET, DOSE PACK ORAL
Qty: 21 TABLET | Refills: 0 | Status: SHIPPED | OUTPATIENT
Start: 2019-04-18 | End: 2019-04-25

## 2019-04-18 ASSESSMENT — PAIN SCALES - GENERAL: PAINLEVEL: NO PAIN (0)

## 2019-04-18 NOTE — PROGRESS NOTES
HISTORY OF PRESENT ILLNESS  Ms. Schmidt is a pleasant 39 year old year old female who presents to clinic today for followup for lumbar MRI  She is improving her low back pain but it is still present and her knees still ache a little but they are better  Cont. To do HEP  And PT  Taking voltaren and flexeril  Had an episode last week of severe pain shooting down left leg    MEDICAL HISTORY  Patient Active Problem List   Diagnosis     Asthma     Menstrual migraine     Left-sided low back pain with left-sided sciatica     Car sickness     Cervical cancer screening       Current Outpatient Medications   Medication Sig Dispense Refill     butalbital-acetaminophen-caffeine (FIORICET/ESGIC) -40 MG tablet Take 1 tablet by mouth every 6 hours as needed       cyclobenzaprine (FLEXERIL) 10 MG tablet Take 1 tablet (10 mg) by mouth 3 times daily as needed for muscle spasms 30 tablet 1     diclofenac (VOLTAREN) 75 MG EC tablet Take 1 tablet (75 mg) by mouth 2 times daily 40 tablet 1     ondansetron (ZOFRAN) 8 MG tablet Take 1 tablet (8 mg) by mouth every 8 hours as needed for nausea 30 tablet 1       No Known Allergies    No family history on file.    Additional medical/Social/Surgical histories reviewed in [employer] and updated as appropriate.     REVIEW OF SYSTEMS (4/18/2019)  10 point ROS of systems including Constitutional, Eyes, Respiratory, Cardiovascular, Gastroenterology, Genitourinary, Integumentary, Musculoskeletal, Psychiatric were all negative except for pertinent positives noted in my HPI.     PHYSICAL EXAM  Vitals:    04/18/19 1218   BP: 128/73   SpO2: (!) 69%     Vital Signs: /73   SpO2 (!) 69%  Patient declined being weighed. There is no height or weight on file to calculate BMI.    General  - normal appearance, in no obvious distress, overweight  CV  - normal peripheral perfusion  Pulm  - normal respiratory pattern, non-labored  Musculoskeletal - lumbar spine  - stance: normal gait without limp, no obvious  leg length discrepancy, normal heel and toe walk  - inspection: normal bone and joint alignment, no obvious scoliosis  - palpation: no paravertebral or bony tenderness  - ROM: flexion exacerbates pain, abnormal extension, sidebending, rotation with some low back discomfort  - strength: lower extremities 5/5 in all planes  - special tests:  (+) straight leg raise  (+) slump test  Neuro  - patellar and Achilles DTRs 2+ bilaterally, left lower extremity sensory deficit throughout L5 distribution, grossly normal coordination, normal muscle tone  Skin  - no ecchymosis, erythema, warmth, or induration, no obvious rash  Psych  - interactive, appropriate, normal mood and affect    ASSESSMENT & PLAN  38 yo female with lumbar disc herniation, and bilateral PF pain  Reviewed lumbar MRI; shows herniated disc at L4/5  Ordered BRONW  Medrol pack  Cont. PT and HEP  Lidocaine patches PRN  F/u after BROWN      Nikhil Briggs MD, CAQSM

## 2019-04-18 NOTE — TELEPHONE ENCOUNTER
Patient called to schedule BROWN, informed patient she would need a preop physical.  Pt became hesitant about having sedation and stated that she would need to think and about it and call back.

## 2019-04-18 NOTE — LETTER
4/18/2019         RE: Edda Schmidt  [address] [address] [place] [address]        Dear Colleague,    Thank you for referring your patient, Edda Schmidt, to the [facility]. Please see a copy of my visit note below.    HISTORY OF PRESENT ILLNESS  Ms. Schmidt is a pleasant 39 year old year old female who presents to clinic today for followup for lumbar MRI  She is improving her low back pain but it is still present and her knees still ache a little but they are better  Cont. To do HEP  And PT  Taking voltaren and flexeril  Had an episode last week of severe pain shooting down left leg    MEDICAL HISTORY  Patient Active Problem List   Diagnosis     Asthma     Menstrual migraine     Left-sided low back pain with left-sided sciatica     Car sickness     Cervical cancer screening       Current Outpatient Medications   Medication Sig Dispense Refill     butalbital-acetaminophen-caffeine (FIORICET/ESGIC) -40 MG tablet Take 1 tablet by mouth every 6 hours as needed       cyclobenzaprine (FLEXERIL) 10 MG tablet Take 1 tablet (10 mg) by mouth 3 times daily as needed for muscle spasms 30 tablet 1     diclofenac (VOLTAREN) 75 MG EC tablet Take 1 tablet (75 mg) by mouth 2 times daily 40 tablet 1     ondansetron (ZOFRAN) 8 MG tablet Take 1 tablet (8 mg) by mouth every 8 hours as needed for nausea 30 tablet 1       No Known Allergies    No family history on file.    Additional medical/Social/Surgical histories reviewed in [facility] and updated as appropriate.     REVIEW OF SYSTEMS (4/18/2019)  10 point ROS of systems including Constitutional, Eyes, Respiratory, Cardiovascular, Gastroenterology, Genitourinary, Integumentary, Musculoskeletal, Psychiatric were all negative except for pertinent positives noted in my HPI.     PHYSICAL EXAM  Vitals:    04/18/19 1218   BP: 128/73   SpO2: (!) 69%     Vital Signs: /73   SpO2 (!) 69%  Patient declined being weighed. There is no height or weight on file to calculate  BMI.    General  - normal appearance, in no obvious distress, overweight  CV  - normal peripheral perfusion  Pulm  - normal respiratory pattern, non-labored  Musculoskeletal - lumbar spine  - stance: normal gait without limp, no obvious leg length discrepancy, normal heel and toe walk  - inspection: normal bone and joint alignment, no obvious scoliosis  - palpation: no paravertebral or bony tenderness  - ROM: flexion exacerbates pain, abnormal extension, sidebending, rotation with some low back discomfort  - strength: lower extremities 5/5 in all planes  - special tests:  (+) straight leg raise  (+) slump test  Neuro  - patellar and Achilles DTRs 2+ bilaterally, left lower extremity sensory deficit throughout L5 distribution, grossly normal coordination, normal muscle tone  Skin  - no ecchymosis, erythema, warmth, or induration, no obvious rash  Psych  - interactive, appropriate, normal mood and affect    ASSESSMENT & PLAN  40 yo female with lumbar disc herniation, and bilateral PF pain  Reviewed lumbar MRI; shows herniated disc at L4/5  Ordered BROWN  Medrol pack  Cont. PT and HEP  Lidocaine patches PRN  F/u after BROWN      Nikhil Briggs MD, CAQSM    Again, thank you for allowing me to participate in the care of your patient.        Sincerely,        Nikhil Briggs MD

## 2019-04-18 NOTE — NURSING NOTE
Edda Schmidt's chief complaint for this visit includes:  Chief Complaint   Patient presents with     Lower Back - Follow Up     MR lumbar done 4/11/19     PCP: No Ref-Primary, Physician    Referring Provider:  No referring provider defined for this encounter.    /73   SpO2 (!) 69%   No Pain (0)     Do you need any medication refills at today's visit? n/a

## 2019-04-18 NOTE — PATIENT INSTRUCTIONS
Thanks for coming today.  Ortho[facility] Medicine Clinic  [address] [place] [address]    To schedule future appointments in Ortho Clinic, you may call [phone].    To schedule ordered imaging by your provider:   Call Central [facility] [phone]    To schedule an injection ordered by your provider:  Call [facility] Injection scheduling line: [phone]  Monitor Backlinkshart available online at:  [email or web address]    Please call if any further questions or concerns ([phone]).  Clinic hours 8 am to 5 pm.    Return to clinic (call) if symptoms worsen or fail to improve.

## 2019-04-19 NOTE — PROGRESS NOTES
[address]  [facility] [address]  [phone]  Dept: [phone]    PRE-OP EVALUATION:  Today's date: 2019    Edda Schmidt (: 1980) presents for pre-operative evaluation assessment as requested by Dr. Tanner .  She requires evaluation and anesthesia risk assessment prior to undergoing surgery/procedure for treatment of back .    Fax number for surgical facility: [facility]  Primary Physician: Rosetta Nicole  Type of Anesthesia Anticipated: Monitor care    Patient has a Health Care Directive or Living Will:  NO    Preop Questions 2019   What are you having done? Lumbar injection   Date of Surgery/Procedure: 19   Facility or Hospital where procedure/surgery will be performed: [facility]   1.  Do you have a history of Heart attack, stroke, stent, coronary bypass surgery, or other heart surgery? No   2.  Do you ever have any pain or discomfort in your chest? No   3.  Do you have a history of  Heart Failure? No   4.   Are you troubled by shortness of breath when:  walking on a level surface, or up a slight hill, or at night? No   5.  Do you currently have a cold, bronchitis or other respiratory infection? No   6.  Do you have a cough, shortness of breath, or wheezing? No   7.  Do you sometimes get pains in the calves of your legs when you walk? No   8. Do you or anyone in your family have previous history of blood clots? No   9.  Do you or does anyone in your family have a serious bleeding problem such as prolonged bleeding following surgeries or cuts? No   10. Have you ever had problems with anemia or been told to take iron pills? No   11. Have you had any abnormal blood loss such as black, tarry or bloody stools, or abnormal vaginal bleeding? No   12. Have you ever had a blood transfusion? No   13. Have you or any of your relatives ever had problems with anesthesia? No   14. Do you have sleep apnea, excessive snoring or daytime  drowsiness? No   15. Do you have any prosthetic heart valves? No   16. Do you have prosthetic joints? No   17. Is there any chance that you may be pregnant? No         HPI:     HPI related to upcoming procedure: Receiving lumbar 4-5 translaminar injection for left-sided low back pain with left-sided sciatica      See problem list for active medical problems.  Problems all longstanding and stable, except as noted/documented.  See ROS for pertinent symptoms related to these conditions.                                                                                                                                                          .    MEDICAL HISTORY:     Patient Active Problem List    Diagnosis Date Noted     Cervical cancer screening      Priority: Medium     5/22/13 NIL pap, neg HR HPV in Care Everywhere.   1/31/19 NIL pap, neg HR HPV. Plan: Cotest in 5 years.       Menstrual migraine 01/31/2019     Priority: Medium     Left-sided low back pain with left-sided sciatica 01/31/2019     Priority: Medium     Car sickness 01/31/2019     Priority: Medium     Asthma 05/12/2011     Priority: Medium     Overview:   Asthma  mild intermittant        No past medical history on file.  No past surgical history on file.  Current Outpatient Medications   Medication Sig Dispense Refill     butalbital-acetaminophen-caffeine (FIORICET/ESGIC) -40 MG tablet Take 1 tablet by mouth every 6 hours as needed       cyclobenzaprine (FLEXERIL) 10 MG tablet Take 1 tablet (10 mg) by mouth 3 times daily as needed for muscle spasms 30 tablet 1     diclofenac (VOLTAREN) 75 MG EC tablet Take 1 tablet (75 mg) by mouth 2 times daily 40 tablet 1     methylPREDNISolone (MEDROL DOSEPAK) 4 MG tablet therapy pack Follow Package Directions 21 tablet 0     ondansetron (ZOFRAN) 8 MG tablet Take 1 tablet (8 mg) by mouth every 8 hours as needed for nausea 30 tablet 1     OTC products: None, except as noted above    No Known Allergies   Latex  Allergy: NO    Social History     Tobacco Use     Smoking status: Never Smoker     Smokeless tobacco: Never Used   Substance Use Topics     Alcohol use: Yes     Frequency: 2-3 times a week     Drinks per session: 1 or 2     Binge frequency: Never     History   Drug Use No       REVIEW OF SYSTEMS:   Constitutional, neuro, ENT, endocrine, pulmonary, cardiac, gastrointestinal, genitourinary, musculoskeletal, integument and psychiatric systems are negative, except as otherwise noted.    EXAM:   There were no vitals taken for this visit.    GENERAL APPEARANCE: healthy, alert and no distress, obese     EYES: EOMI, PERRL     HENT: ear canals and TM's normal and nose and mouth without ulcers or lesions     NECK: no adenopathy, no asymmetry, masses, or scars and thyroid normal to palpation     RESP: lungs clear to auscultation - no rales, rhonchi or wheezes     CV: regular rates and rhythm, normal S1 S2, no S3 or S4 and no murmur, click or rub     ABDOMEN:  soft, nontender, no HSM or masses and bowel sounds normal     MS: extremities normal- no gross deformities noted, no evidence of inflammation in joints, FROM in all extremities.     SKIN: no suspicious lesions or rashes to visible skin     NEURO: Normal strength and tone, sensory exam grossly normal, mentation intact and speech normal     PSYCH: mentation appears normal. and affect normal/bright     LYMPHATICS: No cervical adenopathy    DIAGNOSTICS:   No labs or EKG required for low risk surgery (cataract, skin procedure, breast biopsy, etc)    No results for input(s): HGB, PLT, INR, NA, POTASSIUM, CR, A1C in the last 65122 hours.     IMPRESSION:   Reason for surgery/procedure: left-sided low back pain  Diagnosis/reason for consult: pre-op consultation    The proposed surgical procedure is considered LOW risk.    REVISED CARDIAC RISK INDEX  The patient has the following serious cardiovascular risks for perioperative complications such as (MI, PE, VFib and 3  AV  Block):  No serious cardiac risks  INTERPRETATION: 0 risks: Class I (very low risk - 0.4% complication rate)    The patient has the following additional risks for perioperative complications:  The ASCVD Risk score (Delisa SHAMIR Jr., et al., 2013) failed to calculate for the following reasons:    The 2013 ASCVD risk score is only valid for ages 40 to 79      ICD-10-CM    1. Preop general physical exam Z01.818    2. Screening for HIV (human immunodeficiency virus) Z11.4    3. Need for prophylactic vaccination and inoculation against influenza Z23        RECOMMENDATIONS:     --Patient is to take all scheduled medications on the day of surgery EXCEPT for modifications listed below.    Anticoagulant or Antiplatelet Medication Use  NSAIDS: Diclofenac (Voltaren):    Stop one day prior to surgery        APPROVAL GIVEN to proceed with proposed procedure, without further diagnostic evaluation     This document serves as a record of the services and decisions personally performed and made by Rosetta Nicole MD. It was created on her behalf by Pardeep Garcia, a trained medical scribe. The creation of this document is based the provider's statements to the medical scribe.  Pardeep Garcia, April 25, 2019 3:41 PM     The information in this document, created by the medical scribe for me, accurately reflects the services I personally performed and the decisions made by me. I have reviewed and approved this document for accuracy.   Rosetta Nicole MD      Signed Electronically by: Rosetta Nicole MD, MD    Copy of this evaluation report is provided to requesting physician.    [facility] Preop Guidelines    Revised Cardiac Risk Index

## 2019-04-25 ENCOUNTER — OFFICE VISIT (OUTPATIENT)
Dept: FAMILY MEDICINE | Facility: OTHER | Age: 39
End: 2019-04-25
Payer: COMMERCIAL

## 2019-04-25 VITALS
BODY MASS INDEX: 35.99 KG/M2 | TEMPERATURE: 100 F | OXYGEN SATURATION: 96 % | HEIGHT: 65 IN | WEIGHT: 216 LBS | SYSTOLIC BLOOD PRESSURE: 108 MMHG | HEART RATE: 69 BPM | DIASTOLIC BLOOD PRESSURE: 64 MMHG | RESPIRATION RATE: 16 BRPM

## 2019-04-25 DIAGNOSIS — J45.20 MILD INTERMITTENT ASTHMA WITHOUT COMPLICATION: ICD-10-CM

## 2019-04-25 DIAGNOSIS — Z11.4 SCREENING FOR HIV (HUMAN IMMUNODEFICIENCY VIRUS): ICD-10-CM

## 2019-04-25 DIAGNOSIS — Z01.818 PREOP GENERAL PHYSICAL EXAM: Primary | ICD-10-CM

## 2019-04-25 PROCEDURE — 99214 OFFICE O/P EST MOD 30 MIN: CPT | Performed by: FAMILY MEDICINE

## 2019-04-25 ASSESSMENT — MIFFLIN-ST. JEOR: SCORE: 1655.65

## 2019-04-26 ASSESSMENT — ASTHMA QUESTIONNAIRES: ACT_TOTALSCORE: 25

## 2019-05-09 ENCOUNTER — ANESTHESIA EVENT (OUTPATIENT)
Dept: SURGERY | Facility: CLINIC | Age: 39
End: 2019-05-09
Payer: COMMERCIAL

## 2019-05-10 ENCOUNTER — HOSPITAL ENCOUNTER (OUTPATIENT)
Dept: GENERAL RADIOLOGY | Facility: CLINIC | Age: 39
End: 2019-05-10
Attending: ANESTHESIOLOGY | Admitting: ANESTHESIOLOGY
Payer: COMMERCIAL

## 2019-05-10 ENCOUNTER — ANESTHESIA (OUTPATIENT)
Dept: SURGERY | Facility: CLINIC | Age: 39
End: 2019-05-10
Payer: COMMERCIAL

## 2019-05-10 ENCOUNTER — HOSPITAL ENCOUNTER (OUTPATIENT)
Facility: CLINIC | Age: 39
Discharge: HOME | End: 2019-05-10
Attending: ANESTHESIOLOGY | Admitting: ANESTHESIOLOGY
Payer: COMMERCIAL

## 2019-05-10 VITALS
OXYGEN SATURATION: 98 % | SYSTOLIC BLOOD PRESSURE: 114 MMHG | HEART RATE: 51 BPM | RESPIRATION RATE: 18 BRPM | DIASTOLIC BLOOD PRESSURE: 74 MMHG | TEMPERATURE: 98 F

## 2019-05-10 DIAGNOSIS — M51.26 LUMBAR DISC HERNIATION: ICD-10-CM

## 2019-05-10 LAB — HCG UR QL: NEGATIVE

## 2019-05-10 PROCEDURE — 37000008 ZZH ANESTHESIA TECHNICAL FEE, 1ST 30 MIN: Performed by: ANESTHESIOLOGY

## 2019-05-10 PROCEDURE — 25000128 H RX IP 250 OP 636: Performed by: NURSE ANESTHETIST, CERTIFIED REGISTERED

## 2019-05-10 PROCEDURE — 25000128 H RX IP 250 OP 636: Performed by: ANESTHESIOLOGY

## 2019-05-10 PROCEDURE — 25000125 ZZHC RX 250: Performed by: NURSE ANESTHETIST, CERTIFIED REGISTERED

## 2019-05-10 PROCEDURE — 40000277 XR SURGERY CARM FLUORO LESS THAN 5 MIN W STILLS: Mod: TC

## 2019-05-10 PROCEDURE — 62323 NJX INTERLAMINAR LMBR/SAC: CPT | Performed by: ANESTHESIOLOGY

## 2019-05-10 PROCEDURE — 81025 URINE PREGNANCY TEST: CPT | Performed by: ANESTHESIOLOGY

## 2019-05-10 RX ORDER — LIDOCAINE HYDROCHLORIDE 20 MG/ML
INJECTION, SOLUTION INFILTRATION; PERINEURAL PRN
Status: DISCONTINUED | OUTPATIENT
Start: 2019-05-10 | End: 2019-05-10

## 2019-05-10 RX ORDER — IOPAMIDOL 612 MG/ML
INJECTION, SOLUTION INTRATHECAL PRN
Status: DISCONTINUED | OUTPATIENT
Start: 2019-05-10 | End: 2019-05-10 | Stop reason: HOSPADM

## 2019-05-10 RX ORDER — TRIAMCINOLONE ACETONIDE 40 MG/ML
INJECTION, SUSPENSION INTRA-ARTICULAR; INTRAMUSCULAR PRN
Status: DISCONTINUED | OUTPATIENT
Start: 2019-05-10 | End: 2019-05-10 | Stop reason: HOSPADM

## 2019-05-10 RX ORDER — LIDOCAINE 40 MG/G
CREAM TOPICAL
Status: DISCONTINUED | OUTPATIENT
Start: 2019-05-10 | End: 2019-05-10 | Stop reason: HOSPADM

## 2019-05-10 RX ORDER — PROPOFOL 10 MG/ML
INJECTION, EMULSION INTRAVENOUS PRN
Status: DISCONTINUED | OUTPATIENT
Start: 2019-05-10 | End: 2019-05-10

## 2019-05-10 RX ADMIN — PROPOFOL 50 MG: 10 INJECTION, EMULSION INTRAVENOUS at 11:55

## 2019-05-10 RX ADMIN — PROPOFOL 30 MG: 10 INJECTION, EMULSION INTRAVENOUS at 11:58

## 2019-05-10 RX ADMIN — PROPOFOL 50 MG: 10 INJECTION, EMULSION INTRAVENOUS at 11:52

## 2019-05-10 RX ADMIN — PROPOFOL 50 MG: 10 INJECTION, EMULSION INTRAVENOUS at 11:53

## 2019-05-10 RX ADMIN — LIDOCAINE HYDROCHLORIDE 40 MG: 20 INJECTION, SOLUTION INFILTRATION; PERINEURAL at 11:51

## 2019-05-10 RX ADMIN — PROPOFOL 50 MG: 10 INJECTION, EMULSION INTRAVENOUS at 11:54

## 2019-05-10 RX ADMIN — LIDOCAINE HYDROCHLORIDE 1 ML: 10 INJECTION, SOLUTION EPIDURAL; INFILTRATION; INTRACAUDAL; PERINEURAL at 11:19

## 2019-05-10 NOTE — ANESTHESIA PREPROCEDURE EVALUATION
Anesthesia Pre-Procedure Evaluation    Patient: Edda Schmidt   MRN: [id number] : 1980          Preoperative Diagnosis: Lumbar disc herniation with radiculopathy    Procedure(s):  left sided Lumbar 4-5 translaminar injection    History reviewed. No pertinent past medical history.  History reviewed. No pertinent surgical history.    Anesthesia Evaluation     . Pt has not had prior anesthetic            ROS/MED HX    ENT/Pulmonary:     (+)Intermittent asthma Treatment: Inhaler prn,  , . .    Neurologic:  - neg neurologic ROS     Cardiovascular:  - neg cardiovascular ROS   (+) ----. : . . . :. . No previous cardiac testing       METS/Exercise Tolerance:     Hematologic:  - neg hematologic  ROS       Musculoskeletal:   (+)  other musculoskeletal- Left sided back pain      GI/Hepatic:  - neg GI/hepatic ROS       Renal/Genitourinary:  - ROS Renal section negative       Endo:  - neg endo ROS       Psychiatric:  - neg psychiatric ROS       Infectious Disease:  - neg infectious disease ROS       Malignancy:      - no malignancy   Other:    - neg other ROS                      Physical Exam  Normal systems: cardiovascular, pulmonary and dental    Airway   Mallampati: II  TM distance: >3 FB  Neck ROM: full    Dental     Cardiovascular   Rhythm and rate: regular and normal      Pulmonary    breath sounds clear to auscultation            No results found for: WBC, HGB, HCT, PLT, CRP, SED, NA, POTASSIUM, CHLORIDE, CO2, BUN, CR, GLC, RAINA, PHOS, MAG, ALBUMIN, PROTTOTAL, ALT, AST, GGT, ALKPHOS, BILITOTAL, BILIDIRECT, LIPASE, AMYLASE, MARY KAY, PTT, INR, FIBR, TSH, T4, T3, HCG, HCGS, CKTOTAL, CKMB, TROPN    Preop Vitals  BP Readings from Last 3 Encounters:   19 108/64   19 128/73   19 121/79    Pulse Readings from Last 3 Encounters:   19 69   19 63   19 52      Resp Readings from Last 3 Encounters:   19 16   19 16    SpO2 Readings from Last 3 Encounters:   19 96%   19  "(!) 69%   01/31/19 100%      Temp Readings from Last 1 Encounters:   04/25/19 100  F (37.8  C) (Temporal)    Ht Readings from Last 1 Encounters:   04/25/19 1.651 m (5' 5\")      Wt Readings from Last 1 Encounters:   04/25/19 98 kg (216 lb)    Estimated body mass index is 35.94 kg/m  as calculated from the following:    Height as of 4/25/19: 1.651 m (5' 5\").    Weight as of 4/25/19: 98 kg (216 lb).       Anesthesia Plan      History & Physical Review  History and physical reviewed and following examination; no interval change.    ASA Status:  2 .    NPO Status:  > 8 hours    Plan for MAC with Propofol induction. Reason for MAC:  Deep or markedly invasive procedure (G8)         Postoperative Care      Consents  Anesthetic plan, risks, benefits and alternatives discussed with:  Patient.  Use of blood products discussed: No .   .                 SOURAV Moreno CRNA  "

## 2019-05-10 NOTE — ANESTHESIA CARE TRANSFER NOTE
Patient: Edda Schmidt    Procedure(s):  left sided Lumbar 4-5 translaminar injection    Diagnosis: Lumbar disc herniation with radiculopathy  Diagnosis Additional Information: No value filed.    Anesthesia Type:   MAC     Note:  Anesthesia Care Transfer Notewriter    Vitals: (Last set prior to Anesthesia Care Transfer)    CRNA VITALS  5/10/2019 1133 - 5/10/2019 1233      5/10/2019             Pulse:  59    SpO2:  98 %    Resp Rate (observed):  17    EKG:  Sinus rhythm                Electronically Signed By: SOURAV Moreno CRNA  May 10, 2019  1:12 PM

## 2019-05-10 NOTE — ANESTHESIA POSTPROCEDURE EVALUATION
Patient: Edda Schmidt    Procedure(s):  left sided Lumbar 4-5 translaminar injection    Diagnosis:Lumbar disc herniation with radiculopathy  Diagnosis Additional Information: No value filed.    Anesthesia Type:  MAC    Note:  Anesthesia Post Evaluation    Patient location during evaluation: Phase 2 and Bedside  Patient participation: Able to fully participate in evaluation  Level of consciousness: awake  Pain management: adequate  Airway patency: patent  Cardiovascular status: acceptable and hemodynamically stable  Respiratory status: acceptable, room air and nonlabored ventilation  Hydration status: stable  PONV: none     Anesthetic complications: None    Comments: Patient was happy with the anesthesia care received and no anesthesia related complications were noted.  I will follow up with the patient again if it is needed.        Last vitals:  Vitals:    05/10/19 1118   BP: 115/64   Resp: 18   Temp: 98  F (36.7  C)         Electronically Signed By: SOURAV Moreno CRNA  May 10, 2019  12:10 PM

## 2019-05-10 NOTE — ANESTHESIA CARE TRANSFER NOTE
Patient: Edda Schmidt    Procedure(s):  left sided Lumbar 4-5 translaminar injection    Diagnosis: Lumbar disc herniation with radiculopathy  Diagnosis Additional Information: No value filed.    Anesthesia Type:   MAC     Note:  Airway :Room Air  Patient transferred to:Phase II  Handoff Report: Identifed the Patient, Identified the Reponsible Provider, Reviewed the pertinent medical history, Discussed the surgical course, Reviewed Intra-OP anesthesia mangement and issues during anesthesia, Set expectations for post-procedure period and Allowed opportunity for questions and acknowledgement of understanding      Vitals: (Last set prior to Anesthesia Care Transfer)    CRNA VITALS  5/10/2019 1133 - 5/10/2019 1209      5/10/2019             Pulse:  59    SpO2:  98 %    Resp Rate (observed):  17    EKG:  Sinus rhythm                Electronically Signed By: SOURAV Moreno CRNA  May 10, 2019  12:09 PM

## 2019-05-10 NOTE — DISCHARGE INSTRUCTIONS
Home Care Instructions                Procedure:  Epidural Steroid Injection or Joint injection    Activity:    Rest today    Do not work today    Resume normal activity tomorrow    Pain:    You may experience soreness at the injection site for one or two days    You may use an ice pack for 20 minutes every 2 hours for the first 24 hours    You may use a heating pad after the first 24 hours    You may use Tylenol  (acetaminophen) every 4 hours or other pain medicines as directed by your physician    Safety  Sedation medicine, if given may remain active for many hours.    It is important for the next 24 hours that you do not:    Drive a car    Operate machines or power tools    Consume alcohol, including beer    Sign any important papers or legal documents    You may experience numbness radiating into your legs or arms, (depending on the procedure location)  This numbness may last several hours.  Until the numb sensation returns to normal please use caution in walking, climbing stairs, stepping out of your vehicle, etc.    Common side effects of steroids:  Not everyone will experience corticosteroid side effects. If side effects are experienced they will gradually subside in the 7-10 day period following an injection.    Most common side effects include:    Flushed face and/or chest    Feeling of warmth, particularly in face but could be overall feeling of warmth    Increased blood sugar in diabetic patients    Menstrual irregularities may occur.  If taking hormone based birth control an alternate method of birth control is recommended    Sleep disturbances and/or mood swings are possible    Leg cramps    Please contact us if you have:  Severe pain   Fever more than 101.5 degrees Fahrenheit  Signs of infection (redness, swelling or drainage)      If you have questions during normal business hours (8am-5pm Monday-Friday) contact the [facility] at [phone]. If you need help after hours, we recommend that  you go to a hospital emergency room or dial 911.

## 2019-05-10 NOTE — OP NOTE
CHIEF COMPLAINT: Left sided low back and left-sided buttock pain  PROCEDURE: L4-5 interlaminar epidural steroid injection using fluoroscopic guidance with contrast dye.   PROCEDURE DETAILS: After written informed consent was obtained from the patient, the patient was escorted to the procedure room.  The patient was placed in the prone position.  A  time out  was conducted to verify patient identity, procedure to be performed, side, site, allergies and any special requirements.  The skin over the neck and upper back region were prepped and draped in normal sterile fashion. Fluoroscopy was used to identify the interspace in an AP view and the skin was anesthetized with 2 mL of 1% lidocaine with bicarbonate buffer.  A 20-gauge 3-1/2 inch Tuohy needle was advanced using the loss of resistance technique with preservative free normal saline with fluoroscopic guidance. After negative aspiration for CSF and blood, 1.5 cc of Omnipaque contrast dye was injected revealing the appropriate epidurogram without evidence of intrathecal or intravascular spread. Following this, a 3-mL solution of 40 mg of triamcinolone with 2 cc preservative-free normal saline was slowly injected.  After injection of the medication, as the needle tip was withdrawn, it was flushed with local anesthetic.  The patient was monitored with blood pressure and pulse oximetry machines with the assistance of an RN throughout the procedure.  The patient was alert and responsive to questions throughout the procedure.   The patient tolerated the procedure well and was observed in the post-procedural area.  The patient was dismissed without apparent complications.     DIAGNOSIS:  1.  L4-5 disc herniation causing left-sided buttock pain and back pain.     2.  Lumbar spondylosis causing a possible facet syndrome  PLAN:  1. Performed a L4-5 interlaminar epidural steroid injection.   2. The patient was instructed to follow-up at the [place] [facility] if today's  procedure is not helpful.  Looking at the big picture in regards to a diagnostic work-up, if today's injection is not helpful I would recommend diagnostically screening her facet joints by doing a diagnostic injection with local anesthetic and corticosteroid into her left L4-5 and L5-S1 facet joints.  Her physical exam does not point to SI joint dysfunction and her MRI really is not all that impressive for any significant foraminal stenosis.  Of course she could be having internal disc disruption causing discogenic referred pain to the left buttock and low back.  Nonetheless her MRI is not impressive.  I do think her facet joints are high in the differential for pain generators.  Jordy Tanner MD  Diplomate of the American Board of Anesthesiology, Pain Medicine

## 2019-05-20 NOTE — PROGRESS NOTES
Outpatient Physical Therapy Discharge Note     Patient: Edda Schmidt    : 1980    Beginning/End Dates of Reporting Period: 3/25/2019 to 2019    Referring Provider: Bonnie Eller Diagnosis:  Pt presents with pain, decreased ROM, decreased strength, decreased flexibility, and impaired gait     Client Self Report: Riding a bit more and L knee is gradually getting worse, hurting more with normal things.  Xrays were normal.  Could do better about getting in HS stretching at work.  Way too tight in ITB to start foam roller.    Objective Measurements: Pt was seen for one visit after the evaluation.  See eval for more details.     Goals:  Goal Identifier LEFS   Goal Description Pt will score no greater than 34/80 on LEFS to demonstrate reduced pain and improved functional mobility.   Target Date 19   Date Met      Progress:     Goal Identifier pain   Goal Description Pt will be able to ride horses for 3 hours, squat, and navigate stairs with no greater than 3/10 discomfort in B LEs.   Target Date 19   Date Met      Progress:     Goal Identifier HEP   Goal Description Pt will consistently perform HEP 4x a week to address pain, strength, flexibility, and ROM to allow her to return to prior level of funciton.   Target Date 19   Date Met      Progress:       Progress Toward Goals: At last visit, goals in progress, tighter L > R ITB, weaker L > R glut meds/hip abductors.  Will discharge from PT as chart review indicates pt had a lumbar injection and she has not since returned to PT.    Plan: Discharge from therapy.    Reason for Discharge: Patient chooses to discontinue therapy.    Equipment Issued: none    Discharge Plan: Patient to continue home program.

## 2019-05-20 NOTE — ADDENDUM NOTE
Encounter addended by: Pippa Montilla, PT on: 5/20/2019 9:11 AM   Actions taken: Episode resolved, Pend clinical note, Sign clinical note

## 2019-05-24 ENCOUNTER — THERAPY VISIT (OUTPATIENT)
Dept: CHIROPRACTIC MEDICINE | Facility: CLINIC | Age: 39
End: 2019-05-24
Payer: COMMERCIAL

## 2019-05-24 DIAGNOSIS — M54.9 MECHANICAL BACK PAIN: ICD-10-CM

## 2019-05-24 DIAGNOSIS — M99.02 SEGMENTAL DYSFUNCTION OF THORACIC REGION: ICD-10-CM

## 2019-05-24 DIAGNOSIS — M99.01 SEGMENTAL DYSFUNCTION OF CERVICAL REGION: Primary | ICD-10-CM

## 2019-05-24 DIAGNOSIS — M99.04 SEGMENTAL DYSFUNCTION OF SACRAL REGION: ICD-10-CM

## 2019-05-24 PROCEDURE — 97035 APP MDLTY 1+ULTRASOUND EA 15: CPT | Performed by: CHIROPRACTOR

## 2019-05-24 PROCEDURE — 99203 OFFICE O/P NEW LOW 30 MIN: CPT | Mod: 25 | Performed by: CHIROPRACTOR

## 2019-05-24 PROCEDURE — 98941 CHIROPRACT MANJ 3-4 REGIONS: CPT | Mod: AT | Performed by: CHIROPRACTOR

## 2019-05-24 NOTE — PROGRESS NOTES
Chiropractic Clinic Visit    PCP: Rosetta Nicole    Edda Schmidt is a 39 year old female who is seen as a self referral presenting with neck pain that she woke up with about a week ago, and it just won't go away. She points to pain on the left side. She states that she sleeps in an awkward position because of her cats. She denies radiation of symptoms. She does have headaches once per week. The pain is rated 2/10, described as sharp with rotation, but now pain with neutral position. She is sleeping okay at night. She is not using ice or heat, but uses Voltarin pain gel for her arthritis. She has been to a chiropractor in the past, about 10 years ago. Patient also notes that she has had some left-sided lower back/SI pain which has been ongoing for years, and she has seen a chiropractor for int he past.       Injury: None    Location of Pain: left cervical spine pain in left suboccipitals   Duration of Pain: 1 week(s)  Rating of Pain at worst: 4/10  Rating of Pain Currently: 2/10  Symptoms are better with: Other medications: Voltarin  Symptoms are worse with: rotation  Additional Features: RN in [employer]       Health History  as reported by the patient:    How does the patient rate their own health:   Good    Current or past medical history:   Asthma - exercise, History of fractures - jaw 1987, Migraines/headaches, Osteoarthritis - left knee, L3-4 injection, Overweight, Pain at night/rest and Smoking    Medical allergies:  None known    Past Traumas/Surgeries:  Jaw repair, appendectomy, tonsillectomy,      Family History:  Cardiac, HTN, DM2, cancer    Medications:  Anti-inflammatory and Muscle relaxants    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       Review of Systems  Musculoskeletal: as above  Remainder of review of systems is  negative including constitutional, CV, pulmonary, GI, Skin and Neurologic except as noted in HPI or medical history.    Past Medical History:   Diagnosis Date     Motion sickness      Past Surgical History:   Procedure Laterality Date     INJECT EPIDURAL LUMBAR Left 5/10/2019    Procedure: left sided Lumbar 4-5 translaminar injection;  Surgeon: Jordy Tanner MD;  Location: [facility] OR       Objective  There were no vitals taken for this visit.    GENERAL APPEARANCE: healthy, alert and no distress   GAIT: NORMAL  SKIN: no suspicious lesions or rashes  NEURO: Normal strength and tone, mentation intact and speech normal  PSYCH:  mentation appears normal and affect normal/bright    Edda was asked to complete the Neck Disability Index, today in the office. NDI Disability score: 12%; pain severity scale: 2/10.    Cervical Spine Exam    Range of Motion:         LR and LLF mildly reduced and RR moderately reduced    Inspection:         No visible deformity        normal lordotic curvature maintained    Tender:        Left suboccipitals      Muscle strength:       C5 (shoulder abduction) symmetric 5/5 Normal       C6 (elbow flexion) symmetric 5/5 Normal       C7 (elbow extension) symmetric 5/5 Normal       C8 (finger abduction, thumb flexion) symmetric 5/5 Normal    Reflexes:        C5 (biceps) symmetric 2 bilaterally       C6 (supinator) symmetric 2 bilaterally       C7 (triceps) symmetric 2 bilaterally    Sensation:       grossly intact througout bilateral upper extremities    Special Tests:       neg (-) Spurling  Jose Daniel's- negative, Distraction - negative and Shoulder depression - Right negative and Left positive    Lymphatics:        no edema noted in the upper extremities       Segmental spinal dysfunction/restrictions found at:  C2 , C5 , T1 , T5 , T9  and PSIS Left         Muscle spasm found in:Sub-occipital and Traps      Radiology:  None warranted at this time, consider if no improvement with conservative  management.     Assessment:    No diagnosis found.    RX ordered/plan of care: Mechanical neck pain likely attributed by degenerative changes and poor sleep position, with associated myospasm and intersegmental dysfunction.   Anticipated outcomes: Patient is expected to get relief with care.   Possible risks and side effects: Minimal soreness expected post-adjustment.     After discussing the risk and benefits of care, patient consented to treatment.    Patient's condition:  Patient had restrictions pre-manipulation and Patient had decreased motion prior to manipulation    Treatment effectiveness:  Post manipulation there is better intersegmental movement and Patient claims to feel looser post manipulation    Plan:    Procedures:    Evaluation and Management:  74012 Moderate level exam 30 min    CMT:  25344 Chiropractic manipulative treatment 3-4 regions performed   Cervical: Diversified, C2, C5 , Supine  Thoracic: Diversified, T1, T5, T9, Prone  Pelvis: Drop Table, PSIS Left , Prone    Modalities:  58633: US:  1.0 [place]/cm squared for 8 minutes at 1.0mhz  Continuous  pulsed, Location: left suboccipitals    Therapeutic procedures:  Gave patient Ice instructions post adjustment, and instructions for acute care    Response to Treatment:  Patient tolerated treatment well today.     Prognosis: Good      Treatment plan and goals:  Goals:  Decrease pain in left cervical spine.  Increase CAROM.  Return to pain-free ADLs.    Frequency of care  Duration of care is estimated to be 4 weeks, from the initial treatment.  It is estimated that the patient will need a total of 6 visits to resolve this episode.  For the initial therapeutic trial of care, the frequency is recommended at 1-2 times per week.  A reevaluation would be clinically appropriate in 6 visits, to determine progress and further course of care.    In-Office Treatment  Evaluation  Spinal Chiropractic Manipulative Therapy:  Trial of care - re-evaluate after 6  visits.       Recommendations:    Instructions:  ice 20 minutes every other hour as needed and heat 15 minutes every other hour as needed    Follow-up:  Return to care next week.     Disclaimer: This note consists of symbols derived from keyboarding, dictation and/or voice recognition software. As a result, there may be errors in the script that have gone undetected. Please consider this when interpreting information found in this chart.

## 2019-05-31 ENCOUNTER — THERAPY VISIT (OUTPATIENT)
Dept: CHIROPRACTIC MEDICINE | Facility: CLINIC | Age: 39
End: 2019-05-31
Payer: COMMERCIAL

## 2019-05-31 DIAGNOSIS — M99.01 SEGMENTAL DYSFUNCTION OF CERVICAL REGION: ICD-10-CM

## 2019-05-31 DIAGNOSIS — M54.9 MECHANICAL BACK PAIN: ICD-10-CM

## 2019-05-31 DIAGNOSIS — M99.02 SEGMENTAL DYSFUNCTION OF THORACIC REGION: ICD-10-CM

## 2019-05-31 DIAGNOSIS — M99.04 SEGMENTAL DYSFUNCTION OF SACRAL REGION: Primary | ICD-10-CM

## 2019-05-31 PROCEDURE — 98941 CHIROPRACT MANJ 3-4 REGIONS: CPT | Mod: AT | Performed by: CHIROPRACTOR

## 2019-05-31 NOTE — PROGRESS NOTES
Visit #:  2 of 8 based on treatment plan 5/24/2019    Subjective:  Edda Schmidt is a 39 year old female who is seen in f/u up for:        Segmental dysfunction of cervical region  Segmental dysfunction of thoracic region  Segmental dysfunction of sacral region  Mechanical back pain.     Since last visit on 5/24/2019,  Edda Schmidt reports the following changes: Patient presents and states that she was a little sore after her adjustment, got some relief for most of the week, now she is starting to feel sore again as of last night. She has been doing a lot of digging in the dirt which contributed to her pain. She feels pain on the left side of her lower back. Her neck has been feeling really good. She rates her current pain 1/10 today.      Objective:  The following was observed:    P: palpatory tenderness    A: static palpation demonstrates intersegmental asymmetry     R: motion palpation notes restricted motion    T: localized muscle spasm at: Gluteal, Lumbar erector spine and Piriformis L>>R      Assessment:    Segmental spinal dysfunction/restrictions found at:  C2   C5   T1   T5  T10  L4  PSIS Left    Diagnoses:      1. Segmental dysfunction of cervical region    2. Segmental dysfunction of thoracic region    3. Segmental dysfunction of sacral region    4. Mechanical back pain        Patient's condition:  Patient had restrictions pre-manipulation    Treatment effectiveness:  Post manipulation there is better intersegmental movement and Patient claims to feel looser post manipulation      Procedures:  CMT:  95938 Chiropractic manipulative treatment 3-4 regions performed   Cervical: Diversified, C2, C5 , Supine  Thoracic: Diversified, T1, T5, T10, Prone  Lumbar: Diversified and Drop Table, L4, Prone, Side posture  Pelvis: Diversified and Drop Table, PSIS Left , Prone, Side posture    Modalities:  04581: MSTM:  To Gluteal, Lumbar erector spine and Piriformis  for 5 min    Therapeutic procedures:  Gave patient  Ice instructions post adjustment, and instructions for acute care      Prognosis: Good    Progress towards Goals:   Decrease pain in left cervical spine.  Increase CAROM.  Return to pain-free ADLs.      Response to Treatment:   Reduction of symptoms with care, cervical spine pain has resolved, left lower back is more of the issue now.       Recommendations:    Instructions:ice 20 minutes every other hour as needed    Follow-up:  Return to care next week. Patient referred for massage at [facility].

## 2019-06-05 ENCOUNTER — THERAPY VISIT (OUTPATIENT)
Dept: CHIROPRACTIC MEDICINE | Facility: CLINIC | Age: 39
End: 2019-06-05
Payer: COMMERCIAL

## 2019-06-05 DIAGNOSIS — M99.01 SEGMENTAL DYSFUNCTION OF CERVICAL REGION: ICD-10-CM

## 2019-06-05 DIAGNOSIS — M99.02 SEGMENTAL DYSFUNCTION OF THORACIC REGION: ICD-10-CM

## 2019-06-05 DIAGNOSIS — M54.9 MECHANICAL BACK PAIN: ICD-10-CM

## 2019-06-05 DIAGNOSIS — M99.04 SEGMENTAL DYSFUNCTION OF SACRAL REGION: Primary | ICD-10-CM

## 2019-06-05 PROCEDURE — 98941 CHIROPRACT MANJ 3-4 REGIONS: CPT | Mod: AT | Performed by: CHIROPRACTOR

## 2019-06-05 NOTE — PROGRESS NOTES
Visit #:  3 of 8 based on treatment plan 5/24/2019    Subjective:  Edda Schmidt is a 39 year old female who is seen in f/u up for:        Segmental dysfunction of cervical region  Segmental dysfunction of thoracic region  Segmental dysfunction of sacral region  Mechanical back pain.     Since last visit on 5/31/2019,  Edda Schmidt reports the following changes: Patient presents and states that she just got off work and is ready for bed. She did 25 miles on Saturday and her back was really sore, but it is feeling better. She currently doesn't have pain.        Objective:  The following was observed:    P: palpatory tenderness    A: static palpation demonstrates intersegmental asymmetry     R: motion palpation notes restricted motion    T: localized muscle spasm at: Gluteal, Lumbar erector spine and Piriformis L>>R      Assessment:    Segmental spinal dysfunction/restrictions found at:  C2 RR, LRR  C5 LR, RRR  T1 RR, LRR  T5 E, FR  T10 E, FR  L4 RR, LRR  Left SI posterior    Diagnoses:      1. Segmental dysfunction of cervical region    2. Segmental dysfunction of thoracic region    3. Segmental dysfunction of sacral region    4. Mechanical back pain        Patient's condition:  Patient had restrictions pre-manipulation    Treatment effectiveness:  Post manipulation there is better intersegmental movement and Patient claims to feel looser post manipulation      Procedures:  CMT:  00422 Chiropractic manipulative treatment 3-4 regions performed   Cervical: Diversified, C2, C5 , Supine  Thoracic: Diversified, T1, T5, T10, Prone  Lumbar: Diversified and Drop Table, L4, Prone, Side posture  Pelvis: Diversified and Drop Table, PSIS Left , Prone, Side posture    Modalities:  36066: MSTM:  To Gluteal, Lumbar erector spine and Piriformis  for 5 min    Therapeutic procedures:  Gave patient Ice instructions post adjustment, and instructions for acute care      Prognosis: Good    Progress towards Goals:   Decrease pain in  left cervical spine.  Increase CAROM.  Return to pain-free ADLs.      Response to Treatment:   Reduction of symptoms with care, cervical spine pain has resolved, left lower back is more of the issue now.       Recommendations:    Instructions:ice 20 minutes every other hour as needed    Follow-up:  Return to care next week. Patient referred for massage at [facility].

## 2019-10-14 ENCOUNTER — OFFICE VISIT (OUTPATIENT)
Dept: FAMILY MEDICINE | Facility: OTHER | Age: 39
End: 2019-10-14
Payer: COMMERCIAL

## 2019-10-14 VITALS
HEIGHT: 65 IN | WEIGHT: 220 LBS | BODY MASS INDEX: 36.65 KG/M2 | HEART RATE: 68 BPM | DIASTOLIC BLOOD PRESSURE: 70 MMHG | SYSTOLIC BLOOD PRESSURE: 118 MMHG | TEMPERATURE: 98.9 F | OXYGEN SATURATION: 98 % | RESPIRATION RATE: 16 BRPM

## 2019-10-14 DIAGNOSIS — N63.21 BREAST LUMP ON LEFT SIDE AT 1 O'CLOCK POSITION: Primary | ICD-10-CM

## 2019-10-14 DIAGNOSIS — J45.20 MILD INTERMITTENT ASTHMA WITHOUT COMPLICATION: ICD-10-CM

## 2019-10-14 DIAGNOSIS — Z23 NEED FOR PROPHYLACTIC VACCINATION AND INOCULATION AGAINST INFLUENZA: ICD-10-CM

## 2019-10-14 PROCEDURE — 90471 IMMUNIZATION ADMIN: CPT | Performed by: FAMILY MEDICINE

## 2019-10-14 PROCEDURE — 99213 OFFICE O/P EST LOW 20 MIN: CPT | Mod: 25 | Performed by: FAMILY MEDICINE

## 2019-10-14 PROCEDURE — 90686 IIV4 VACC NO PRSV 0.5 ML IM: CPT | Performed by: FAMILY MEDICINE

## 2019-10-14 ASSESSMENT — ASTHMA QUESTIONNAIRES
ACT_TOTALSCORE: 24
QUESTION_5 LAST FOUR WEEKS HOW WOULD YOU RATE YOUR ASTHMA CONTROL: COMPLETELY CONTROLLED
QUESTION_2 LAST FOUR WEEKS HOW OFTEN HAVE YOU HAD SHORTNESS OF BREATH: ONCE OR TWICE A WEEK
QUESTION_4 LAST FOUR WEEKS HOW OFTEN HAVE YOU USED YOUR RESCUE INHALER OR NEBULIZER MEDICATION (SUCH AS ALBUTEROL): NOT AT ALL
QUESTION_3 LAST FOUR WEEKS HOW OFTEN DID YOUR ASTHMA SYMPTOMS (WHEEZING, COUGHING, SHORTNESS OF BREATH, CHEST TIGHTNESS OR PAIN) WAKE YOU UP AT NIGHT OR EARLIER THAN USUAL IN THE MORNING: NOT AT ALL
QUESTION_1 LAST FOUR WEEKS HOW MUCH OF THE TIME DID YOUR ASTHMA KEEP YOU FROM GETTING AS MUCH DONE AT WORK, SCHOOL OR AT HOME: NONE OF THE TIME

## 2019-10-14 ASSESSMENT — MIFFLIN-ST. JEOR: SCORE: 1673.79

## 2019-10-14 NOTE — PROGRESS NOTES
Subjective     Edda Schmidt is a 39 year old female who presents to clinic today for the following health issues:    HPI   Concern - Breast lump (left)  Onset: One month     Description:   Patient found lump in left breast about one month ago, states is is mobile. Painful at times.     Intensity: mild    Progression of Symptoms:  Worsening    Accompanying Signs & Symptoms:  May have increased in size    Previous history of similar problem:   Had a cyst in 2016 in the left breast. Went away in its own.     Precipitating factors:   Worsened by: NA    Alleviating factors:  Improved by: NA    Therapies Tried and outcome: NA  -------------------------------------    Patient Active Problem List   Diagnosis     Asthma     Menstrual migraine     Left-sided low back pain with left-sided sciatica     Car sickness     Segmental dysfunction of cervical region     Segmental dysfunction of thoracic region     Segmental dysfunction of sacral region     Mechanical back pain     Past Surgical History:   Procedure Laterality Date     INJECT EPIDURAL LUMBAR Left 5/10/2019    Procedure: left sided Lumbar 4-5 translaminar injection;  Surgeon: Jordy Tanner MD;  Location:  OR       Social History     Tobacco Use     Smoking status: Never Smoker     Smokeless tobacco: Never Used   Substance Use Topics     Alcohol use: Yes     Frequency: 2-3 times a week     Drinks per session: 1 or 2     Binge frequency: Never     History reviewed. No pertinent family history.        Reviewed and updated as needed this visit by Provider  Tobacco  Allergies  Meds  Problems  Med Hx  Surg Hx  Fam Hx         Review of Systems   ROS COMP: Constitutional, HEENT, cardiovascular, pulmonary, GI, , musculoskeletal, neuro, skin, endocrine and psych systems are negative, except as otherwise noted.      Objective    /70 (BP Location: Left arm, Patient Position: Chair, Cuff Size: Adult Regular)   Pulse 68   Temp 98.9  F (37.2  C) (Temporal)    "Resp 16   Ht 1.651 m (5' 5\")   Wt 99.8 kg (220 lb)   SpO2 98%   Breastfeeding? No   BMI 36.61 kg/m    Body mass index is 36.61 kg/m .  Physical Exam   GENERAL: healthy, alert and no distress  RESP: lungs clear to auscultation - no rales, rhonchi or wheezes  BREAST: normal without masses, tenderness or nipple discharge and no palpable axillary masses or adenopathy  CV: regular rate and rhythm, normal S1 S2, no S3 or S4, no murmur, click or rub, no peripheral edema and peripheral pulses strong  SKIN: no suspicious lesions or rashes  NEURO: Normal strength and tone, mentation intact and speech normal  PSYCH: mentation appears normal, affect normal/bright            Assessment & Plan       ICD-10-CM    1. Breast lump on left side at 1 o'clock position N63.21 US Breast Left Complete 4 Quadrants     MA Diagnostic Digital Left   2. Need for prophylactic vaccination and inoculation against influenza Z23 INFLUENZA VACCINE IM > 6 MONTHS VALENT IIV4 [28866]     Vaccine Administration, Initial [54163]   3. Mild intermittent asthma without complication J45.20      Reassured that lump is mobile, but since it has been present more than 1 month, should be evaluated.   Updated ACT and has not been using inhalers, so looks good otherwise.       BMI:   Estimated body mass index is 36.61 kg/m  as calculated from the following:    Height as of this encounter: 1.651 m (5' 5\").    Weight as of this encounter: 99.8 kg (220 lb).   Weight management plan: Discussed healthy diet and exercise guidelines      Return in about 6 months (around 4/14/2020) for asthma.    Rosetta Nicole MD, MD  [facility]    "

## 2019-10-15 ASSESSMENT — ASTHMA QUESTIONNAIRES: ACT_TOTALSCORE: 24

## 2019-10-16 ENCOUNTER — ANCILLARY PROCEDURE (OUTPATIENT)
Dept: ULTRASOUND IMAGING | Facility: CLINIC | Age: 39
End: 2019-10-16
Attending: FAMILY MEDICINE
Payer: COMMERCIAL

## 2019-10-16 ENCOUNTER — ANCILLARY PROCEDURE (OUTPATIENT)
Dept: MAMMOGRAPHY | Facility: CLINIC | Age: 39
End: 2019-10-16
Attending: FAMILY MEDICINE
Payer: COMMERCIAL

## 2019-10-16 DIAGNOSIS — N63.21 BREAST LUMP ON LEFT SIDE AT 1 O'CLOCK POSITION: ICD-10-CM

## 2019-10-16 PROCEDURE — 76642 ULTRASOUND BREAST LIMITED: CPT | Mod: LT

## 2019-10-16 PROCEDURE — G0279 TOMOSYNTHESIS, MAMMO: HCPCS

## 2019-10-16 PROCEDURE — 77066 DX MAMMO INCL CAD BI: CPT

## 2019-10-16 NOTE — RESULT ENCOUNTER NOTE
Results look great. Nice and reassuring.  Please let me know if you have any questions.    Rosetta Nicole MD

## 2019-11-27 ENCOUNTER — MYC REFILL (OUTPATIENT)
Dept: ORTHOPEDICS | Facility: CLINIC | Age: 39
End: 2019-11-27

## 2019-11-27 ENCOUNTER — MYC REFILL (OUTPATIENT)
Dept: FAMILY MEDICINE | Facility: OTHER | Age: 39
End: 2019-11-27

## 2019-11-27 DIAGNOSIS — M54.42 LEFT-SIDED LOW BACK PAIN WITH LEFT-SIDED SCIATICA, UNSPECIFIED CHRONICITY: ICD-10-CM

## 2019-11-27 DIAGNOSIS — T75.3XXD CAR SICKNESS, SUBSEQUENT ENCOUNTER: ICD-10-CM

## 2019-11-27 DIAGNOSIS — M22.2X2 PATELLOFEMORAL PAIN SYNDROME OF BOTH KNEES: ICD-10-CM

## 2019-11-27 DIAGNOSIS — M22.2X1 PATELLOFEMORAL PAIN SYNDROME OF BOTH KNEES: ICD-10-CM

## 2019-11-27 RX ORDER — CYCLOBENZAPRINE HCL 10 MG
10 TABLET ORAL 3 TIMES DAILY PRN
Qty: 30 TABLET | Refills: 0 | Status: SHIPPED | OUTPATIENT
Start: 2019-11-27 | End: 2020-04-23

## 2019-11-27 RX ORDER — ONDANSETRON 8 MG/1
8 TABLET, FILM COATED ORAL EVERY 8 HOURS PRN
Qty: 30 TABLET | Refills: 1 | Status: SHIPPED | OUTPATIENT
Start: 2019-11-27 | End: 2020-04-23

## 2019-11-27 NOTE — TELEPHONE ENCOUNTER
Pending Prescriptions:                       Disp   Refills    cyclobenzaprine (FLEXERIL) 10 MG tablet   30 tab*1            Sig: Take 1 tablet (10 mg) by mouth 3 times daily as           needed for muscle spasms        Last Written Prescription Date:  2/4/19  Last Fill Quantity: 30,   # refills: 1  Last Office Visit: 10/14/19  Future Office visit:       Routing refill request to provider for review/approval because:  Drug not on the [facility] or [facility] refill protocol or controlled substance          ondansetron (ZOFRAN) 8 MG tablet          30 tab*1            Sig: Take 1 tablet (8 mg) by mouth every 8 hours as           needed for nausea  Prescription approved per [facility] Refill Protocol.    Jenny Marroquin, RN, BSN

## 2019-11-29 RX ORDER — DICLOFENAC SODIUM 75 MG/1
75 TABLET, DELAYED RELEASE ORAL 2 TIMES DAILY
Qty: 40 TABLET | Refills: 1 | Status: SHIPPED | OUTPATIENT
Start: 2019-11-29 | End: 2020-09-20

## 2019-12-06 ENCOUNTER — THERAPY VISIT (OUTPATIENT)
Dept: CHIROPRACTIC MEDICINE | Facility: CLINIC | Age: 39
End: 2019-12-06
Payer: COMMERCIAL

## 2019-12-06 DIAGNOSIS — M99.02 SEGMENTAL DYSFUNCTION OF THORACIC REGION: ICD-10-CM

## 2019-12-06 DIAGNOSIS — M99.01 SEGMENTAL DYSFUNCTION OF CERVICAL REGION: Primary | ICD-10-CM

## 2019-12-06 DIAGNOSIS — M54.9 MECHANICAL BACK PAIN: ICD-10-CM

## 2019-12-06 DIAGNOSIS — M99.04 SEGMENTAL DYSFUNCTION OF SACRAL REGION: ICD-10-CM

## 2019-12-06 PROCEDURE — 98941 CHIROPRACT MANJ 3-4 REGIONS: CPT | Mod: AT | Performed by: CHIROPRACTOR

## 2019-12-06 PROCEDURE — 99212 OFFICE O/P EST SF 10 MIN: CPT | Mod: 25 | Performed by: CHIROPRACTOR

## 2019-12-06 NOTE — PROGRESS NOTES
Visit #:  4 of 8 based on treatment plan 5/24/2019    Subjective:  Edda Schmidt is a 39 year old female who is seen in f/u up for:        Segmental dysfunction of cervical region  Segmental dysfunction of thoracic region  Segmental dysfunction of sacral region  Mechanical back pain.     Since last visit on 6/5/2019,  Edda Schmidt reports the following changes: Patient presents and states that she had to break down and get another massage. She currently has pain in her shoulder and left side of her neck. She feels tight between her shoulder blades and down in her lower back. Her left shoulder was really tender after her massage, and she feels a lot of pinching. She rates her current pain 2/10.        Objective:  The following was observed:    P: palpatory tenderness upper back, between scapulae, lower back    A: static palpation demonstrates intersegmental asymmetry     R: motion palpation notes restricted motion    T: localized muscle spasm at: Gluteal, Lumbar erector spine and Piriformis L>>R      Assessment:  Oswestry: 10 % functional impairment      Segmental spinal dysfunction/restrictions found at:  C2 RR, LRR  T1 RR, LRR  T5 E, FR  T10 E, FR  L4 LR, RRR  Rightt SI posterior    Diagnoses:      1. Segmental dysfunction of cervical region    2. Segmental dysfunction of thoracic region    3. Segmental dysfunction of sacral region    4. Mechanical back pain        Patient's condition:  Patient had restrictions pre-manipulation    Treatment effectiveness:  Post manipulation there is better intersegmental movement and Patient claims to feel looser post manipulation      Procedures:  Level 2 re-exam  CMT:  72098 Chiropractic manipulative treatment 3-4 regions performed   Cervical: Diversified, C2, Supine  Thoracic: Diversified, T1, T5, T10, Prone  Lumbar: Diversified and Drop Table, L4, Prone, Side posture  Pelvis: Diversified and Drop Table, PSIS Right, Prone, Side posture    Modalities:  52284: MSTM:  To  Gluteal, Lumbar erector spine and Piriformis  for 5 min    Therapeutic procedures:  Gave patient Ice instructions post adjustment, and instructions for acute care      Prognosis: Good    Progress towards Goals:   Decrease pain in left cervical spine.  Increase CAROM.  Return to pain-free ADLs.      Response to Treatment:   Reduction of symptoms with care.      Recommendations:    Instructions:ice 20 minutes every other hour as needed    Follow-up:  Return to care after next massage at [facility].

## 2019-12-18 ENCOUNTER — THERAPY VISIT (OUTPATIENT)
Dept: CHIROPRACTIC MEDICINE | Facility: CLINIC | Age: 39
End: 2019-12-18
Payer: COMMERCIAL

## 2019-12-18 DIAGNOSIS — M99.03 SEGMENTAL DYSFUNCTION OF LUMBAR REGION: ICD-10-CM

## 2019-12-18 DIAGNOSIS — M99.04 SEGMENTAL DYSFUNCTION OF SACRAL REGION: Primary | ICD-10-CM

## 2019-12-18 DIAGNOSIS — M54.9 MECHANICAL BACK PAIN: ICD-10-CM

## 2019-12-18 DIAGNOSIS — M99.02 SEGMENTAL DYSFUNCTION OF THORACIC REGION: ICD-10-CM

## 2019-12-18 DIAGNOSIS — M99.01 SEGMENTAL DYSFUNCTION OF CERVICAL REGION: ICD-10-CM

## 2019-12-18 PROCEDURE — 98941 CHIROPRACT MANJ 3-4 REGIONS: CPT | Mod: AT | Performed by: CHIROPRACTOR

## 2019-12-18 NOTE — PROGRESS NOTES
"Visit #:  5 of 8 based on treatment plan 5/24/2019    Subjective:  Edda Schmidt is a 39 year old female who is seen in f/u up for:        Segmental dysfunction of cervical region  Segmental dysfunction of thoracic region  Segmental dysfunction of sacral region  Mechanical back pain.     Since last visit on 12/6/2019,  Edda Schmidt reports the following changes: Patient presents and states that she is stiff this morning. She went in for a massage Monday and she is still \"ridiculously tight.\" Her shoulder feels better but her lower back is still bothering her, rated 2/10.        Objective:  The following was observed:    P: palpatory tenderness lower back    A: static palpation demonstrates intersegmental asymmetry     R: motion palpation notes restricted motion    T: localized muscle spasm at: Gluteal, Lumbar erector spine and Piriformis L>>R      Assessment:      Segmental spinal dysfunction/restrictions found at:  C2 RR, LRR  C5 LR, RRR  T1 RR, LRR  T5 E, FR  T10 E, FR  L4 LR, RRR  Right SI posterior    Diagnoses:      1. Segmental dysfunction of cervical region    2. Segmental dysfunction of thoracic region    3. Segmental dysfunction of sacral region    4. Mechanical back pain        Patient's condition:  Patient had restrictions pre-manipulation    Treatment effectiveness:  Post manipulation there is better intersegmental movement and Patient claims to feel looser post manipulation      Procedures:  CMT:  70103 Chiropractic manipulative treatment 3-4 regions performed   Cervical: Diversified, C2, C5, Supine  Thoracic: Diversified, T1, T5, T10, Prone  Lumbar: Diversified and Drop Table, L4, Prone, Side posture  Pelvis: Diversified and Drop Table, PSIS Right, Prone, Side posture    Modalities:  92875: MSTM:  To Gluteal, Lumbar erector spine and Piriformis  for 5 min HYPERVOLT    Therapeutic procedures:  Gave patient Ice instructions post adjustment, and instructions for acute care      Prognosis: " Good    Progress towards Goals:   Decrease pain in left cervical spine.  Increase CAROM.  Return to pain-free ADLs.      Response to Treatment:   Reduction of symptoms with care.      Recommendations:    Instructions:ice 20 minutes every other hour as needed    Follow-up:  Return to care PRN.

## 2020-01-08 ENCOUNTER — THERAPY VISIT (OUTPATIENT)
Dept: CHIROPRACTIC MEDICINE | Facility: CLINIC | Age: 40
End: 2020-01-08
Payer: COMMERCIAL

## 2020-01-08 DIAGNOSIS — M99.02 SEGMENTAL DYSFUNCTION OF THORACIC REGION: ICD-10-CM

## 2020-01-08 DIAGNOSIS — M99.01 SEGMENTAL DYSFUNCTION OF CERVICAL REGION: ICD-10-CM

## 2020-01-08 DIAGNOSIS — M99.04 SEGMENTAL DYSFUNCTION OF SACRAL REGION: Primary | ICD-10-CM

## 2020-01-08 DIAGNOSIS — M54.9 MECHANICAL BACK PAIN: ICD-10-CM

## 2020-01-08 PROCEDURE — 98941 CHIROPRACT MANJ 3-4 REGIONS: CPT | Mod: AT | Performed by: CHIROPRACTOR

## 2020-01-08 NOTE — PROGRESS NOTES
"Visit #:  6 of 8 based on treatment plan 5/24/2019    Subjective:  Edda Schmidt is a 39 year old female who is seen in f/u up for:        Segmental dysfunction of cervical region  Segmental dysfunction of thoracic region  Segmental dysfunction of sacral region  Mechanical back pain.     Since last visit on 12/18/2019,  Edda Schmidt reports the following changes: Patient presents and states that she is not too bad, she has the \"same old, same old, but overall - not bad.\" She notes a tiny pain in her left hip after she worked this weekend. She has been flared up since. She has used ice and muscle relaxers.        Objective:  The following was observed:    P: palpatory tenderness lower back and left hip    A: static palpation demonstrates intersegmental asymmetry     R: motion palpation notes restricted motion    T: localized muscle spasm at: Gluteal, Lumbar erector spine and Piriformis L>>R      Assessment:      Segmental spinal dysfunction/restrictions found at:  C2 RR, LRR  C5 LR, RRR  T1 RR, LRR  T5 E, FR  T10 E, FR  L4 RR, LRR  Left SI posterior    Diagnoses:      1. Segmental dysfunction of cervical region    2. Segmental dysfunction of thoracic region    3. Segmental dysfunction of sacral region    4. Mechanical back pain        Patient's condition:  Patient had restrictions pre-manipulation    Treatment effectiveness:  Post manipulation there is better intersegmental movement and Patient claims to feel looser post manipulation      Procedures:  CMT:  67710 Chiropractic manipulative treatment 3-4 regions performed   Cervical: Diversified, C2, C5, Supine  Thoracic: Diversified, T1, T5, T10, Prone  Lumbar: Diversified and Drop Table, L4, Prone, Side posture  Pelvis: Diversified and Drop Table, PSIS Left, Prone, Side posture    Modalities:  57162: MSTM:  To Gluteal, Lumbar erector spine and Piriformis  for 5 min HYPERVOLT    Therapeutic procedures:  Gave patient Ice instructions post adjustment, and " instructions for acute care      Prognosis: Good    Progress towards Goals:   Decrease pain in left cervical spine.  Increase CAROM.  Return to pain-free ADLs.      Response to Treatment:   Reduction of symptoms with care.      Recommendations:    Instructions:ice 20 minutes every other hour as needed    Follow-up:  Return to care PRN.

## 2020-03-11 ENCOUNTER — HEALTH MAINTENANCE LETTER (OUTPATIENT)
Age: 40
End: 2020-03-11

## 2020-04-23 ENCOUNTER — MYC REFILL (OUTPATIENT)
Dept: FAMILY MEDICINE | Facility: OTHER | Age: 40
End: 2020-04-23

## 2020-04-23 DIAGNOSIS — T75.3XXD CAR SICKNESS, SUBSEQUENT ENCOUNTER: ICD-10-CM

## 2020-04-23 DIAGNOSIS — M54.42 LEFT-SIDED LOW BACK PAIN WITH LEFT-SIDED SCIATICA, UNSPECIFIED CHRONICITY: ICD-10-CM

## 2020-04-23 RX ORDER — CYCLOBENZAPRINE HCL 10 MG
10 TABLET ORAL 3 TIMES DAILY PRN
Qty: 30 TABLET | Refills: 0 | Status: SHIPPED | OUTPATIENT
Start: 2020-04-23 | End: 2020-09-20

## 2020-04-23 RX ORDER — ONDANSETRON 8 MG/1
8 TABLET, FILM COATED ORAL EVERY 8 HOURS PRN
Qty: 30 TABLET | Refills: 1 | Status: SHIPPED | OUTPATIENT
Start: 2020-04-23 | End: 2020-09-20

## 2020-04-23 NOTE — TELEPHONE ENCOUNTER
Last seen over a year ago with specialty for this. Due for appt. Not sure if just need a few to get by until she can be seen in person in a few months and I can do this. Otherwise, should be offering a video visit to evaluate if pain is worsening. So given donna until she can complete one of these - 30 tabs sent.  Rosetta Nicole MD

## 2020-04-23 NOTE — TELEPHONE ENCOUNTER
Routing refill request to provider for review/approval because:  Drug not on the [facility] refill protocol     Last Written Prescription Date:  11/27/19  Last Fill Quantity: 30,  # refills: 0   Last office visit: 10/14/2019 with prescribing provider:     Future Office Visit:      Rupa Diaz, RN, BSN

## 2020-04-23 NOTE — TELEPHONE ENCOUNTER
Pending Prescriptions:                       Disp   Refills    ondansetron (ZOFRAN) 8 MG tablet          30 tab*1            Sig: Take 1 tablet (8 mg) by mouth every 8 hours as           needed for nausea    Prescription approved per [facility] Refill Protocol.    Ann-Marie Mayorga, MSN, RN

## 2020-05-22 ENCOUNTER — HOSPITAL ENCOUNTER (EMERGENCY)
Facility: CLINIC | Age: 40
Discharge: HOME | End: 2020-05-22
Attending: FAMILY MEDICINE | Admitting: FAMILY MEDICINE
Payer: COMMERCIAL

## 2020-05-22 ENCOUNTER — APPOINTMENT (OUTPATIENT)
Dept: GENERAL RADIOLOGY | Facility: CLINIC | Age: 40
End: 2020-05-22
Attending: FAMILY MEDICINE
Payer: COMMERCIAL

## 2020-05-22 VITALS
SYSTOLIC BLOOD PRESSURE: 142 MMHG | DIASTOLIC BLOOD PRESSURE: 86 MMHG | OXYGEN SATURATION: 98 % | RESPIRATION RATE: 18 BRPM | TEMPERATURE: 99 F

## 2020-05-22 DIAGNOSIS — M25.562 ACUTE PAIN OF LEFT KNEE: ICD-10-CM
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
## 2020-05-22 DIAGNOSIS — S93.402A SPRAIN OF LEFT ANKLE, UNSPECIFIED LIGAMENT, INITIAL ENCOUNTER: ICD-10-CM

## 2020-05-22 PROCEDURE — 99283 EMERGENCY DEPT VISIT LOW MDM: CPT | Performed by: FAMILY MEDICINE

## 2020-05-22 PROCEDURE — 73562 X-RAY EXAM OF KNEE 3: CPT | Mod: TC,LT

## 2020-05-22 PROCEDURE — 73610 X-RAY EXAM OF ANKLE: CPT | Mod: TC,LT

## 2020-05-22 PROCEDURE — 99283 EMERGENCY DEPT VISIT LOW MDM: CPT | Mod: Z6 | Performed by: FAMILY MEDICINE

## 2020-05-22 NOTE — ED AVS SNAPSHOT
[facility] Emergency Department  [address] [place] [address]  Phone:  [phone]  Fax:  [phone]                                    Edda Schmidt   MRN: [id number]    Department:  [facility] Emergency Department   Date of Visit:  5/22/2020           After Visit Summary Signature Page    I have received my discharge instructions, and my questions have been answered. I have discussed any challenges I see with this plan with the nurse or doctor.    ..........................................................................................................................................  Patient/Patient Representative Signature      ..........................................................................................................................................  Patient Representative Print Name and Relationship to Patient    ..................................................               ................................................  Date                                   Time    ..........................................................................................................................................  Reviewed by Signature/Title    ...................................................              ..............................................  Date                                               Time          22EPIC Rev 08/18

## 2020-05-23 NOTE — ED PROVIDER NOTES
History     Chief Complaint   Patient presents with     Ankle Pain     HPI  Edda Schmidt is a 40 year old female who presents to the ED this evening with left ankle pain.  She was riding her horse and it spooked and she fell off.  She is not sure if she twisted her ankle when she landed or when the horse jumped.  Not sure which way it twisted.  Happened so fast.  She complains of pain medially.  She is able to bear weight although she walks with a limp.  Since the night has gone on, she notices some soreness in other areas but nothing unexpected.  She was wearing a helmet and did not strike her head.    Allergies:  No Known Allergies    Problem List:    Patient Active Problem List    Diagnosis Date Noted     Segmental dysfunction of cervical region 05/24/2019     Priority: Medium     Segmental dysfunction of thoracic region 05/24/2019     Priority: Medium     Segmental dysfunction of sacral region 05/24/2019     Priority: Medium     Mechanical back pain 05/24/2019     Priority: Medium     Menstrual migraine 01/31/2019     Priority: Medium     Left-sided low back pain with left-sided sciatica 01/31/2019     Priority: Medium     Car sickness 01/31/2019     Priority: Medium     Asthma 05/12/2011     Priority: Medium     Overview:   Asthma  mild intermittant          Past Medical History:    Past Medical History:   Diagnosis Date     Motion sickness        Past Surgical History:    Past Surgical History:   Procedure Laterality Date     INJECT EPIDURAL LUMBAR Left 5/10/2019    Procedure: left sided Lumbar 4-5 translaminar injection;  Surgeon: Jordy Tanner MD;  Location: [facility] OR       Family History:    No family history on file.    Social History:  Marital Status:   [4]  Social History     Tobacco Use     Smoking status: Never Smoker     Smokeless tobacco: Never Used   Substance Use Topics     Alcohol use: Yes     Frequency: 2-3 times a week     Drinks per session: 1 or 2     Binge frequency: Never     Drug  use: No        Medications:    butalbital-acetaminophen-caffeine (FIORICET/ESGIC) -40 MG tablet  cyclobenzaprine (FLEXERIL) 10 MG tablet  diclofenac (VOLTAREN) 75 MG EC tablet  Misc Natural Products (COSAMIN ASU FOR JOINT HEALTH PO)  ondansetron (ZOFRAN) 8 MG tablet          Review of Systems   All other systems reviewed and are negative.      Physical Exam   BP: (!) 142/86  Heart Rate: 68  Temp: 99  F (37.2  C)  Resp: 18  SpO2: 98 %      Physical Exam  Constitutional:       General: She is not in acute distress.     Appearance: Normal appearance.   HENT:      Head: Normocephalic and atraumatic.   Eyes:      Extraocular Movements: Extraocular movements intact.   Cardiovascular:      Rate and Rhythm: Normal rate.   Pulmonary:      Effort: Pulmonary effort is normal. No respiratory distress.   Musculoskeletal:      Left hip: Normal.      Left knee: She exhibits decreased range of motion (just feels tight). She exhibits no swelling, no effusion (but possible, difficult exam due to size of knee/leg), no LCL laxity, no bony tenderness and no MCL laxity. No tenderness found.      Left ankle: She exhibits swelling (medial) and ecchymosis (mild). Tenderness. Medial malleolus tenderness found. No head of 5th metatarsal and no proximal fibula tenderness found.        Legs:    Skin:     General: Skin is warm and dry.   Neurological:      General: No focal deficit present.      Mental Status: She is alert and oriented to person, place, and time.   Psychiatric:         Mood and Affect: Mood normal.          ED Course:     40-year-old female injured her left ankle when she fell off her horse earlier this evening at about 1830.  Complains of medial pain.  She is able to bear weight although walks with a limp.  Denies any other significant injuries.    Left ankle x-ray was negative for fracture and reviewed by radiology.  I reviewed those results with her.  Now her left knee is feeling a bit stiff.  X-ray was ordered to rule  out bony injury.  Difficult to tell clinically if she might have a small effusion.  Her knee is stable from a ligamentous standpoint.      Knee x-ray was negative for fracture.  She does have rather large knees so she could certainly have a small effusion that would be difficult to palpate clinically.  I offered her crutches.  We discussed an Aircast splint versus a boot.  She would rather use the Aircast splint and thinks she can make it without crutches.  If she changes her mind, she can  a set from a friend or local pharmacy.  If she has persistent problems I encouraged her to see Dr. Pendleton from podiatry.  If her knee continues to bother her, she can see her primary physician or orthopedics.  Ice liberally.  Tylenol/ibuprofen as needed.  She is comfortable with this plan.  Verbal and written discharge instructions given.            Procedures               Critical Care time:  none               Results for orders placed or performed during the hospital encounter of 05/22/20 (from the past 24 hour(s))   XR Ankle Left G/E 3 Views    Narrative    EXAM: XR ANKLE LT G/E 3 VW  LOCATION: [facility], [place]  DATE/TIME: 5/22/2020 9:15 PM    INDICATION: Pain after fall  COMPARISON: None.      Impression    IMPRESSION: Soft tissue swelling over the medial malleolus. No evidence for fracture or disruption of ankle mortise. There is a calcification posterior to the tibiotalar joint which is potentially loose within the joint. Plantar and Achilles calcaneal   spurring.   XR Knee Left 3 Views    Narrative    EXAM: XR KNEE LT 3 VW  LOCATION: [facility], [place]  DATE/TIME: 5/22/2020 9:59 PM    INDICATION: Pain after fall.  COMPARISON: None.      Impression    IMPRESSION: No acute findings. No fracture or dislocation.       Medications - No data to display    Assessments & Plan     I have reviewed the nursing notes.    I have reviewed the findings, diagnosis, plan and need for follow up with the patient.        New Prescriptions    No medications on file       Final diagnoses:   Sprain of left ankle, unspecified ligament, initial encounter   Acute pain of left knee   Fall from horse, initial encounter       5/22/2020   [facility] EMERGENCY DEPARTMENT     Mike Gross MD  05/22/20 9021

## 2020-05-23 NOTE — DISCHARGE INSTRUCTIONS
Ice 20 minutes per hour, (20 minutes on, 40 minutes off) at least 4 times a day.  Ibuprofen 600 mg and Tylenol 1000 mg every 6 hours as needed for pain.  You can take them at the same time.  Take with food so the Ibuprofen doesn't upset your stomach. (do not take Ibuprofen if you are taking Voltaren/diclofenac or any other NSAID)  Wear the Aircast splint as needed.  You can wean out as pain allows.  You can use crutches if needed.  If you have persistent problems with your ankle, see Dr. Pendleton from podiatry.  If your knee continues to bother you, see Dr Nicole or one of the orthopedic specialists.  It was a pleasure visiting with you this evening.  I am glad you were not hurt more severely.    Thank you for choosing [facility]. We appreciate the opportunity to meet your urgent medical needs. Please let us know if we could have done anything to make your stay more satisfying.    After discharge, please closely monitor for any new or worsening symptoms. Return to the Emergency Department if you develop any acute worsening signs or symptoms.    If you had lab work, cultures or imaging studies done during your stay, the final results may still be pending. We will call you if your plan of care needs to change. However, if you are not improving as expected, please follow up with your primary care provider or clinic.     Start any prescription medications that were prescribed to you and take them as directed.     Please see additional handouts that may be pertinent to your condition.

## 2020-05-23 NOTE — ED TRIAGE NOTES
Pt presents with concerns of left ankle pain.  Pt states that she fell off her horse around 1830.  Nothing for pain prior to arrival.

## 2020-08-14 NOTE — PROGRESS NOTES
SUBJECTIVE:   CC: Edda Schmidt is an 40 year old woman who presents for preventive health visit.     Healthy Habits:     Getting at least 3 servings of Calcium per day:  Yes    Bi-annual eye exam:  Yes    Dental care twice a year:  Yes    Sleep apnea or symptoms of sleep apnea:  None    Diet:  Regular (no restrictions)    Frequency of exercise:  2-3 days/week    Duration of exercise:  45-60 minutes    Taking medications regularly:  Yes    Barriers to taking medications:  Problems remembering to take them    Medication side effects:  None    PHQ-2 Total Score: 0    Additional concerns today:  Yes (Pt for incontinence )          -------------------------------------    Today's PHQ-2 Score:   PHQ-2 ( 1999 [employer]) 8/19/2020   Q1: Little interest or pleasure in doing things 0   Q2: Feeling down, depressed or hopeless 0   PHQ-2 Score 0   Q1: Little interest or pleasure in doing things -   Q2: Feeling down, depressed or hopeless -   PHQ-2 Score -       Abuse: Current or Past(Physical, Sexual or Emotional)- No  Do you feel safe in your environment? Yes        Social History     Tobacco Use     Smoking status: Never Smoker     Smokeless tobacco: Never Used   Substance Use Topics     Alcohol use: Yes     Frequency: 2-3 times a week     Drinks per session: 1 or 2     Binge frequency: Never     If you drink alcohol do you typically have >3 drinks per day or >7 drinks per week? No    Alcohol Use 8/19/2020   Prescreen: >3 drinks/day or >7 drinks/week? No       Reviewed orders with patient.  Reviewed health maintenance and updated orders accordingly - Yes      Mammogram Screening: Patient under age 50, mutual decision reflected in health maintenance.      Pertinent mammograms are reviewed under the imaging tab.  History of abnormal Pap smear: NO - age 30-65 PAP every 5 years with negative HPV co-testing recommended  PAP / HPV Latest Ref Rng & Units 1/31/2019   PAP - NIL   HPV 16 DNA NEG:Negative Negative   HPV 18 DNA  "NEG:Negative Negative   OTHER HR HPV NEG:Negative Negative     Reviewed and updated as needed this visit by clinical staff  Tobacco  Allergies  Meds  Problems  Med Hx  Surg Hx  Fam Hx  Soc Hx          Reviewed and updated as needed this visit by Provider  Tobacco  Allergies  Meds  Problems  Med Hx  Surg Hx  Fam Hx            Review of Systems  CONSTITUTIONAL: NEGATIVE for fever, chills, change in weight  INTEGUMENTARY/SKIN: NEGATIVE for worrisome rashes, moles or lesions  EYES: NEGATIVE for vision changes or irritation  ENT: NEGATIVE for ear, mouth and throat problems  RESP: NEGATIVE for significant cough or SOB  BREAST: NEGATIVE for masses, tenderness or discharge  CV: NEGATIVE for chest pain, palpitations or peripheral edema  GI: NEGATIVE for nausea, abdominal pain, heartburn, or change in bowel habits  : NEGATIVE for unusual urinary or vaginal symptoms. No vaginal bleeding.  MUSCULOSKELETAL: NEGATIVE for significant arthralgias or myalgia  NEURO: NEGATIVE for weakness, dizziness or paresthesias  PSYCHIATRIC: NEGATIVE for changes in mood or affect      OBJECTIVE:   /74   Pulse 56   Temp 97.9  F (36.6  C) (Temporal)   Resp 18   Ht 1.67 m (5' 5.75\")   Wt 98.4 kg (217 lb)   LMP 08/13/2020   SpO2 100%   BMI 35.29 kg/m    Physical Exam  GENERAL: healthy, alert and no distress  EYES: Eyes grossly normal to inspection, PERRL and conjunctivae and sclerae normal  HENT: ear canals and TM's normal, nose and mouth without ulcers or lesions  NECK: no adenopathy, no asymmetry, masses, or scars and thyroid normal to palpation  RESP: lungs clear to auscultation - no rales, rhonchi or wheezes  BREAST: normal without masses, tenderness or nipple discharge and no palpable axillary masses or adenopathy  CV: regular rate and rhythm, normal S1 S2, no S3 or S4, no murmur, click or rub, no peripheral edema and peripheral pulses strong  ABDOMEN: soft, nontender, no hepatosplenomegaly, no masses and bowel " "sounds normal  MS: no gross musculoskeletal defects noted, no edema  SKIN: no suspicious lesions or rashes  NEURO: Normal strength and tone, mentation intact and speech normal  PSYCH: mentation appears normal, affect normal/bright        ASSESSMENT/PLAN:       ICD-10-CM    1. Routine general medical examination at a health care facility  Z00.00    2. Mild intermittent asthma without complication  J45.20    3. Screening for HIV (human immunodeficiency virus)  Z11.4    4. Encounter for screening mammogram for breast cancer  Z12.31 MA Screen Bilateral w/Rk   5. Stress incontinence of urine  N39.3 PHYSICAL THERAPY REFERRAL     Patient is doing well with her asthma. Has had worsening of the stress incontinance and is now interested in pelvic PT. Ordered today. Also discussed her daughter is having a lot of stress with basic training and how hard it is to go through this. REcommended leaning on the  extension groups as they often become like family for those in the  as stresses go up for deployments and distance, etc. This is a stressful choice and the groups are very helpful, though we can also offer direct counseling - declines.    COUNSELING:  Reviewed preventive health counseling, as reflected in patient instructions       Regular exercise       Healthy diet/nutrition    Estimated body mass index is 35.29 kg/m  as calculated from the following:    Height as of this encounter: 1.67 m (5' 5.75\").    Weight as of this encounter: 98.4 kg (217 lb).    Weight management plan: Discussed healthy diet and exercise guidelines     reports that she has never smoked. She has never used smokeless tobacco.      Counseling Resources:  ATP IV Guidelines  Pooled Cohorts Equation Calculator  Breast Cancer Risk Calculator  FRAX Risk Assessment  ICSI Preventive Guidelines  Dietary Guidelines for Americans, 2010  USDA's MyPlate  ASA Prophylaxis  Lung CA Screening    Rosetta Nicole MD, MD  [facility]  "

## 2020-08-19 ENCOUNTER — OFFICE VISIT (OUTPATIENT)
Dept: FAMILY MEDICINE | Facility: OTHER | Age: 40
End: 2020-08-19
Payer: COMMERCIAL

## 2020-08-19 VITALS
TEMPERATURE: 97.9 F | OXYGEN SATURATION: 100 % | WEIGHT: 217 LBS | RESPIRATION RATE: 18 BRPM | HEIGHT: 66 IN | SYSTOLIC BLOOD PRESSURE: 116 MMHG | DIASTOLIC BLOOD PRESSURE: 74 MMHG | HEART RATE: 56 BPM | BODY MASS INDEX: 34.87 KG/M2

## 2020-08-19 DIAGNOSIS — Z00.00 ROUTINE GENERAL MEDICAL EXAMINATION AT A HEALTH CARE FACILITY: Primary | ICD-10-CM

## 2020-08-19 DIAGNOSIS — Z11.4 SCREENING FOR HIV (HUMAN IMMUNODEFICIENCY VIRUS): ICD-10-CM

## 2020-08-19 DIAGNOSIS — Z12.31 ENCOUNTER FOR SCREENING MAMMOGRAM FOR BREAST CANCER: ICD-10-CM

## 2020-08-19 DIAGNOSIS — N39.3 STRESS INCONTINENCE OF URINE: ICD-10-CM

## 2020-08-19 DIAGNOSIS — J45.20 MILD INTERMITTENT ASTHMA WITHOUT COMPLICATION: ICD-10-CM

## 2020-08-19 PROCEDURE — 99396 PREV VISIT EST AGE 40-64: CPT | Performed by: FAMILY MEDICINE

## 2020-08-19 PROCEDURE — 99212 OFFICE O/P EST SF 10 MIN: CPT | Mod: 25 | Performed by: FAMILY MEDICINE

## 2020-08-19 ASSESSMENT — MIFFLIN-ST. JEOR: SCORE: 1667.06

## 2020-08-19 ASSESSMENT — PAIN SCALES - GENERAL: PAINLEVEL: NO PAIN (0)

## 2020-08-20 ASSESSMENT — ASTHMA QUESTIONNAIRES: ACT_TOTALSCORE: 25

## 2020-09-08 ENCOUNTER — MYC MEDICAL ADVICE (OUTPATIENT)
Dept: FAMILY MEDICINE | Facility: OTHER | Age: 40
End: 2020-09-08

## 2020-09-09 ENCOUNTER — VIRTUAL VISIT (OUTPATIENT)
Dept: FAMILY MEDICINE | Facility: CLINIC | Age: 40
End: 2020-09-09
Payer: COMMERCIAL

## 2020-09-09 DIAGNOSIS — M79.671 PAIN OF RIGHT HEEL: Primary | ICD-10-CM

## 2020-09-09 DIAGNOSIS — J45.20 MILD INTERMITTENT ASTHMA WITHOUT COMPLICATION: ICD-10-CM

## 2020-09-09 PROCEDURE — 99213 OFFICE O/P EST LOW 20 MIN: CPT | Mod: 95 | Performed by: FAMILY MEDICINE

## 2020-09-09 NOTE — PATIENT INSTRUCTIONS
Patient Education     Treating Plantar Fasciitis  First, your healthcare provider tries to find out the cause of your problem. He or she can then suggest ways to ease pain. If your pain is due to poor foot mechanics, occasionally custom-made shoe inserts (orthoses) may help.    Ease symptoms    To relieve mild symptoms, try aspirin, ibuprofen, or other medicines as directed. Rubbing ice on the area may also help.    To lessen severe pain and swelling, your healthcare provider may give you pills or injections. In some cases, you may need a walking cast. Physical therapy, such as ultrasound or a daily stretching program, may also be recommended. Surgery is rarely needed.    To ease symptoms caused by poor foot mechanics, your foot may be taped. This supports the arch and temporarily controls movement. Night splints may also help by stretching the fascia.    Control movement  If taping helps, your healthcare provider may prescribe orthoses. These are inserts built from plaster casts of your feet. They control the way your foot moves. As a result, your symptoms may go away.  Reduce overuse  Every time your foot strikes the ground, the plantar fascia is stretched. You can lessen the strain on the plantar fascia and the chance of overuse by:    Losing any excess weight    Not running on hard or uneven ground    Using orthoses, if recommended, in your shoes and house slippers  If surgery is needed  Your healthcare provider may consider surgery if other types of treatment don't control your pain. During surgery, the plantar fascia is partially cut to release tension. As you heal, fibrous tissue fills the space between the heel bone and the plantar fascia.  Date Last Reviewed: 1/1/2018 2000-2019 [employer] [address] [place] [address], [place] [address]. All rights reserved. This information is not intended as a substitute for professional medical care. Always follow your healthcare professional's  instructions.

## 2020-09-09 NOTE — TELEPHONE ENCOUNTER
mychart response read by patient, will flag for scheduling to assist in making an appt. See below      To: Janeen Schmidt      From: Sony Davis RN      Created: 9/8/2020 10:46 AM        Oh no.  That does sound painful.  Why don't we get you scheduled for a video or in clinic visit.  Then the provider can discuss further with you as it may be planter fascitis or if they want an xray.  We can certainly do this in a video then have you come in after if they want any scanning done.       I have pasted below some things you can try, but please call the clinic to schedule a video visit.     Take Care!  Sony Davis, RACHEL, RN, PHN          Jennifer Nielsen, RN

## 2020-09-09 NOTE — LETTER
My Asthma Action Plan    Name: Edda Schmidt   YOB: 1980  Date: 9/9/2020   My doctor: Oumar Bowden MD   My clinic: [facility]        My Rescue Medicine:   Albuterol inhaler (Proair/Ventolin/Proventil HFA)  2-4 puffs EVERY 4 HOURS as needed. Use a spacer if recommended by your provider.   My Asthma Severity:   Intermittent / Exercise Induced  Know your asthma triggers: Patient is unaware of triggers             GREEN ZONE   Good Control    I feel good    No cough or wheeze    Can work, sleep and play without asthma symptoms       Take your asthma control medicine every day.     1. If exercise triggers your asthma, take your rescue medication    15 minutes before exercise or sports, and    During exercise if you have asthma symptoms  2. Spacer to use with inhaler: If you have a spacer, make sure to use it with your inhaler             YELLOW ZONE Getting Worse  I have ANY of these:    I do not feel good    Cough or wheeze    Chest feels tight    Wake up at night   1. Keep taking your Green Zone medications  2. Start taking your rescue medicine:    every 20 minutes for up to 1 hour. Then every 4 hours for 24-48 hours.  3. If you stay in the Yellow Zone for more than 12-24 hours, contact your doctor.  4. If you do not return to the Green Zone in 12-24 hours or you get worse, start taking your oral steroid medicine if prescribed by your provider.           RED ZONE Medical Alert - Get Help  I have ANY of these:    I feel awful    Medicine is not helping    Breathing getting harder    Trouble walking or talking    Nose opens wide to breathe       1. Take your rescue medicine NOW  2. If your provider has prescribed an oral steroid medicine, start taking it NOW  3. Call your doctor NOW  4. If you are still in the Red Zone after 20 minutes and you have not reached your doctor:    Take your rescue medicine again and    Call 911 or go to the emergency room right away    See your  regular doctor within 2 weeks of an Emergency Room or Urgent Care visit for follow-up treatment.          Annual Reminders:  Meet with Asthma Educator,  Flu Shot in the Fall, consider Pneumonia Vaccination for patients with asthma (aged 19 and older).    Pharmacy:    [facility] [address] [facility] [address] [place] [address]    Electronically signed by Oumar Bowden MD   Date: 09/09/20                    Asthma Triggers  How To Control Things That Make Your Asthma Worse    Triggers are things that make your asthma worse.  Look at the list below to help you find your triggers and   what you can do about them. You can help prevent asthma flare-ups by staying away from your triggers.      Trigger                                                          What you can do   Cigarette Smoke  Tobacco smoke can make asthma worse. Do not allow smoking in your home, car or around you.  Be sure no one smokes at a child s day care or school.  If you smoke, ask your health care provider for ways to help you quit.  Ask family members to quit too.  Ask your health care provider for a referral to Quit Plan to help you quit smoking, or call 5-128-578-PLAN.     Colds, Flu, Bronchitis  These are common triggers of asthma. Wash your hands often.  Don t touch your eyes, nose or mouth.  Get a flu shot every year.     Dust Mites  These are tiny bugs that live in cloth or carpet. They are too small to see. Wash sheets and blankets in hot water every week.   Encase pillows and mattress in dust mite proof covers.  Avoid having carpet if you can. If you have carpet, vacuum weekly.   Use a dust mask and HEPA vacuum.   Pollen and Outdoor Mold  Some people are allergic to trees, grass, or weed pollen, or molds. Try to keep your windows closed.  Limit time out doors when pollen count is high.   Ask you health care provider about taking medicine during allergy  season.     Animal Dander  Some people are allergic to skin flakes, urine or saliva from pets with fur or feathers. Keep pets with fur or feathers out of your home.    If you can t keep the pet outdoors, then keep the pet out of your bedroom.  Keep the bedroom door closed.  Keep pets off cloth furniture and away from stuffed toys.     Mice, Rats, and Cockroaches  Some people are allergic to the waste from these pests.   Cover food and garbage.  Clean up spills and food crumbs.  Store grease in the refrigerator.   Keep food out of the bedroom.   Indoor Mold  This can be a trigger if your home has high moisture. Fix leaking faucets, pipes, or other sources of water.   Clean moldy surfaces.  Dehumidify basement if it is damp and smelly.   Smoke, Strong Odors, and Sprays  These can reduce air quality. Stay away from strong odors and sprays, such as perfume, powder, hair spray, paints, smoke incense, paint, cleaning products, candles and new carpet.   Exercise or Sports  Some people with asthma have this trigger. Be active!  Ask your doctor about taking medicine before sports or exercise to prevent symptoms.    Warm up for 5-10 minutes before and after sports or exercise.     Other Triggers of Asthma  Cold air:  Cover your nose and mouth with a scarf.  Sometimes laughing or crying can be a trigger.  Some medicines and food can trigger asthma.

## 2020-09-09 NOTE — PROGRESS NOTES
"Edda Schmidt is a 40 year old female who is being evaluated via a billable video visit.      The patient has been notified of following:     \"This video visit will be conducted via a call between you and your physician/provider. We have found that certain health care needs can be provided without the need for an in-person physical exam.  This service lets us provide the care you need with a video conversation.  If a prescription is necessary we can send it directly to your pharmacy.  If lab work is needed we can place an order for that and you can then stop by our lab to have the test done at a later time.    Video visits are billed at different rates depending on your insurance coverage.  Please reach out to your insurance provider with any questions.    If during the course of the call the physician/provider feels a video visit is not appropriate, you will not be charged for this service.\"    Patient has given verbal consent for Video visit? Yes  How would you like to obtain your AVS? MyChart  If you are dropped from the video visit, the video invite should be resent to: Text to cell phone: [phone]  Will anyone else be joining your video visit? No      Subjective     Edda Schmidt is a 40 year old female who presents today via video visit for the following health issues:    HPI    Concern - right heel pain  Onset: 2 weeks  Description: only on the heel   Intensity: moderate  Progression of Symptoms:  worsening  Accompanying Signs & Symptoms: hurts mostly when weight baring  Previous history of similar problem: no  Precipitating factors:        Worsened by: weight baring  Alleviating factors:        Improved by: icing  Therapies tried and outcome: icing and elevating and gives relief    Patient reports no injury to the right heal. Symptoms ongoing for 2 weeks. Worse after prolonged rest and better as activity progresses. No new numbness, but does have numbness in her 4th and 5th right digit while riding " her horse for prolonged periods of time. No fevers, chills, or previous injury. No swelling noted. Pain is on the plantar aspect at the insertion point of the plantar fascia.    No rash noted.    No other concerns.    Video Start Time: 11:04    Review of Systems   Constitutional, msk, neuro, derm, cardiovascular, pulmonary, gi systems are negative, except as otherwise noted.      Objective       Vitals:  No vitals were obtained today due to virtual visit.    Physical Exam     GENERAL: Healthy, alert and no distress  EYES: Eyes grossly normal to inspection.  No discharge or erythema, or obvious scleral/conjunctival abnormalities.  RESP: No audible wheeze, cough, or visible cyanosis.  No visible retractions or increased work of breathing.    SKIN: Visible skin clear. No significant rash, abnormal pigmentation or lesions.  NEURO: Cranial nerves grossly intact.  Mentation and speech appropriate for age.  PSYCH: Mentation appears normal, affect normal/bright, judgement and insight intact, normal speech and appearance well-groomed.    Labs    None    Assessment & Plan   1. Pain of right heel: Likely plantar fasciitis. Discussed footwear, inserts, resting, stretching exercises, and NSAID use. Consider sports med/podiatry referral for consideration of injection. Discussed this can be a lingering injury.     2. Mild intermittent asthma without complication: No concerns. Last ACT in August was 25. Not reviewed in detail. AAP given.  - Asthma Action Plan (AAP)      Return in about 4 weeks (around 10/7/2020), or if symptoms worsen or fail to improve.    Oumar Bowden MD  [facility]    This chart is completed utilizing dictation software; typos and/or incorrect word substitutions may unintentionally occur.    Video-Visit Details    Type of service:  Video Visit    Video End Time:11:24 AM    Originating Location (pt. Location): Home    Distant Location (provider location):  [place]  [facility]     Platform used for Video Visit: Crystal

## 2020-09-20 ENCOUNTER — MYC REFILL (OUTPATIENT)
Dept: FAMILY MEDICINE | Facility: OTHER | Age: 40
End: 2020-09-20

## 2020-09-20 ENCOUNTER — MYC REFILL (OUTPATIENT)
Dept: ORTHOPEDICS | Facility: CLINIC | Age: 40
End: 2020-09-20

## 2020-09-20 DIAGNOSIS — T75.3XXD CAR SICKNESS, SUBSEQUENT ENCOUNTER: ICD-10-CM

## 2020-09-20 DIAGNOSIS — M54.42 LEFT-SIDED LOW BACK PAIN WITH LEFT-SIDED SCIATICA, UNSPECIFIED CHRONICITY: ICD-10-CM

## 2020-09-20 DIAGNOSIS — M22.2X1 PATELLOFEMORAL PAIN SYNDROME OF BOTH KNEES: ICD-10-CM

## 2020-09-20 DIAGNOSIS — M22.2X2 PATELLOFEMORAL PAIN SYNDROME OF BOTH KNEES: ICD-10-CM

## 2020-09-21 RX ORDER — DICLOFENAC SODIUM 75 MG/1
75 TABLET, DELAYED RELEASE ORAL 2 TIMES DAILY
Qty: 40 TABLET | Refills: 1 | Status: SHIPPED | OUTPATIENT
Start: 2020-09-21 | End: 2023-06-14

## 2020-09-22 NOTE — TELEPHONE ENCOUNTER
Pending Prescriptions:                       Disp   Refills    cyclobenzaprine (FLEXERIL) 10 MG tablet    30 tab*0        Sig: Take 1 tablet (10 mg) by mouth 3 times daily as           needed for muscle spasms    ondansetron (ZOFRAN) 8 MG tablet           30 tab*1        Sig: Take 1 tablet (8 mg) by mouth every 8 hours as needed           for nausea    Routing refill request to provider for review/approval because:  Drug not on the [facility] refill protocol   Both meds from 5 months ago, please advise on on going use

## 2020-09-23 RX ORDER — CYCLOBENZAPRINE HCL 10 MG
10 TABLET ORAL 3 TIMES DAILY PRN
Qty: 30 TABLET | Refills: 0 | Status: SHIPPED | OUTPATIENT
Start: 2020-09-23 | End: 2021-04-29

## 2020-09-23 RX ORDER — ONDANSETRON 8 MG/1
8 TABLET, FILM COATED ORAL EVERY 8 HOURS PRN
Qty: 30 TABLET | Refills: 1 | Status: SHIPPED | OUTPATIENT
Start: 2020-09-23 | End: 2022-05-11

## 2020-10-15 ENCOUNTER — MYC MEDICAL ADVICE (OUTPATIENT)
Dept: FAMILY MEDICINE | Facility: OTHER | Age: 40
End: 2020-10-15

## 2020-10-19 ENCOUNTER — HOSPITAL ENCOUNTER (OUTPATIENT)
Dept: MAMMOGRAPHY | Facility: CLINIC | Age: 40
Discharge: HOME | End: 2020-10-19
Attending: FAMILY MEDICINE | Admitting: FAMILY MEDICINE
Payer: COMMERCIAL

## 2020-10-19 DIAGNOSIS — Z12.31 ENCOUNTER FOR SCREENING MAMMOGRAM FOR BREAST CANCER: ICD-10-CM

## 2020-10-19 PROCEDURE — 77063 BREAST TOMOSYNTHESIS BI: CPT

## 2020-10-22 ENCOUNTER — HOSPITAL ENCOUNTER (OUTPATIENT)
Dept: PHYSICAL THERAPY | Facility: CLINIC | Age: 40
Setting detail: THERAPIES SERIES
End: 2020-10-22
Attending: FAMILY MEDICINE
Payer: COMMERCIAL

## 2020-10-22 PROCEDURE — 90912 BFB TRAINING 1ST 15 MIN: CPT | Performed by: PHYSICAL THERAPIST

## 2020-10-22 PROCEDURE — 90913 BFB TRAINING EA ADDL 15 MIN: CPT | Mod: GP | Performed by: PHYSICAL THERAPIST

## 2020-10-22 PROCEDURE — 97110 THERAPEUTIC EXERCISES: CPT | Mod: GP,59 | Performed by: PHYSICAL THERAPIST

## 2020-10-22 PROCEDURE — 97162 PT EVAL MOD COMPLEX 30 MIN: CPT | Mod: GP | Performed by: PHYSICAL THERAPIST

## 2020-10-22 NOTE — PROGRESS NOTES
10/22/20 0700   General Information   Type of Visit Initial OP Ortho PT Evaluation   Start of Care Date 10/22/20   Referring Physician Dr Rosetta Nicole   Patient/Family Goals Statement To no longer leak urine and be able to be active with horses without leaking   Orders Evaluate and Treat   Date of Order 20   Certification Required? No   Medical Diagnosis Stress incontinence of urine   Surgical/Medical history reviewed Yes   Precautions/Limitations no known precautions/limitations   Body Part(s)   Body Part(s) Pelvic Floor Dysfunction   Presentation and Etiology   Pertinent history of current problem (include personal factors and/or comorbidities that impact the POC) Patient has urinary incontinence with running, coughing, sneezing, laughing. This is bad with riding horse, and has to wear a pad all the time. Leaks an amount enough if no pad would have to change her underwear. Occasional leaking with urgency, does not wait too long to urinate. Can wait 3-4 hours between voids. Water drinking about 4 cups a day maybe, and drinks a lot of caffeine in a day, coffee and energy drinks. 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 2 babies vaginal with the first, and the second was breach the youngest is 11. UI started about in the last 5 years.   With the first one tore, and the  because he breach. PMHX: nothing , appendix out . no pain with intercourse.    Impairments F. Decreased strength and endurance;P. Bowel or bladder problems   Functional Limitations perform activities of daily living;perform required work activities;perform desired leisure / sports activities   Onset date of current episode/exacerbation 10/22/15   Chronicity Chronic   Current Level of Function   Patient role/employment history A. Employed   Living environment [place]   Fall Risk Screen   Fall screen completed by PT   Have you fallen 2 or more times in the past year? No   Have you fallen and had  "an injury in the past year? No   Is patient a fall risk? No   Specific Questions   Specific Questions Pelvic Floor Dysfunction;Women's Health   Pelvic Floor Dysfunction Questions   Regular exercise Yes   Fluid intake-glasses/day (one glass/cup = 8oz see subjective very little water and a lot of caffeine   Caffeinated beverages-glasses/day a lot!   How long can you delay the need to urinate?  3-4 hours   How many times do you wake to urinate at night?   1x per night   Can you stop the flow of urine when on the toilet?  Yes   Is the volume of urine passed usually  Average   Do you have the sensation that you need to go to the toilet?  Yes   Number of bladder infections last year?  0   Frequency of bowel movements:  BM every day Barnum #4   Women's Health Questions   Number of pregnancies  2   Number of vaginal deliveries  1   Number of  section deliveries  1   Number of episiotomies  1 tearing   Pelvic Floor Dysfunction Objective Findings   Observation normal perineum   Posture WNL   Areas of Tightness None   Type of Storage Problem urgency;urge incontinence;stress incontinence;mixed   Protection needed Pad   Biofeedback electrode placement Perianal   Position Sitting;Supine   Baseline EMG Pelvic Muscles (mv) 1.7mv   Peak Pelvic Muscle Contraction Work -avg 2 sec (mv) 6mv   Peak Pelvic Muscle Contraction Work-avg 10 sec (mv) 5.8mv   EMG Interpretation Other   Power (MMT at Levator Ani) 3-   Elevation (Able to lift posterior vaginal wall toward head and public bone) Absent   EMG interpretation comment Patient resting is good in supine and in sitting, however the resting after the Kegal is poor \"walking down the steps\" and the over all resting raises with more Kegal holds. Severe weakness also avg with the 10 sec hold is 5.8mv   Planned Therapy Interventions   Planned Therapy Interventions manual therapy;neuromuscular re-education;ROM;strengthening;stretching   Planned Therapy Interventions Comment pelvic floor " program   Planned Modality Interventions   Planned Modality Interventions Biofeedback   Clinical Impression   Criteria for Skilled Therapeutic Interventions Met yes, treatment indicated   PT Diagnosis Urinary incontinence mixed, weakness of the PF, poor coordination of the PF, poor resting of the PF after contraction   Influenced by the following impairments weakness, coordination   Functional limitations due to impairments leaking urine often   Clinical Presentation Evolving/Changing   Clinical Presentation Rationale several issues   Clinical Decision Making (Complexity) Moderate complexity   Therapy Frequency 1 time/week   Predicted Duration of Therapy Intervention (days/wks) 12 wks   Risk & Benefits of therapy have been explained Yes   Patient, Family & other staff in agreement with plan of care Yes   Clinical Impression Comments Weakness of the PF, but very poor coordination of the PF, thus leaking with exertion and with urgency. Patient also takes in large amounts of caffeine   Education Assessment   Preferred Learning Style Listening;Reading;Demonstration   Barriers to Learning No barriers   ORTHO GOALS   PT Ortho Eval Goals 1;2   Ortho Goal 1   Goal Identifier 1   Goal Description Patient able to ride horse, run, sit to stand, and all exertion without leaking of urine   Target Date 01/14/21   Ortho Goal 2   Goal Identifier 2   Goal Description Patient has normal strength, coordination and resting of the pelvic floor for normal voiding and no leaking   Target Date 01/14/21   Total Evaluation Time   PT Eval, Moderate Complexity Minutes (09367) 30   Thank you for the referral,            Loreto Washington PT

## 2020-10-28 ENCOUNTER — NURSE TRIAGE (OUTPATIENT)
Dept: NURSING | Facility: CLINIC | Age: 40
End: 2020-10-28

## 2020-10-28 ENCOUNTER — VIRTUAL VISIT (OUTPATIENT)
Dept: FAMILY MEDICINE | Facility: OTHER | Age: 40
End: 2020-10-28
Payer: COMMERCIAL

## 2020-10-28 PROCEDURE — 99421 OL DIG E/M SVC 5-10 MIN: CPT | Performed by: PHYSICIAN ASSISTANT

## 2020-10-28 NOTE — TELEPHONE ENCOUNTER
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 I would like to get tested(rapid if possible), I have a mild cough , headache and scratchy throat.\"  Denies fever or other sx   Transferred to scheduling for rapid testing locations  Triaged and gave home care advice and to use [email or web address] or call PCP in am 10/29 for telephone visit for covid test.  Call back if needed.  Alina Crouch RN [facility]    COVID 19 Nurse Triage Plan/Patient Instructions    Please be aware that novel coronavirus (COVID-19) may be circulating in the community. If you develop symptoms such as fever, cough, or SOB or if you have concerns about the presence of another infection including coronavirus (COVID-19), please contact your health care provider or visit [email or web address].     Disposition/Instructions    Virtual Visit with provider recommended. Reference Visit Selection Guide.    Thank you for taking steps to prevent the spread of this virus.  o Limit your contact with others.  o Wear a simple mask to cover your cough.  o Wash your hands well and often.    Resources    [facility]: About COVID-19: [email or web address]    CDC: What to Do If You're Sick: www.cdc.gov/coronavirus/2019-ncov/about/steps-when-sick.html    CDC: Ending Home Isolation: www.cdc.gov/coronavirus/2019-ncov/hcp/disposition-in-home-patients.html     CDC: Caring for Someone: www.cdc.gov/coronavirus/2019-ncov/if-you-are-sick/care-for-someone.html     [facility]: Interim Guidance for Hospital Discharge to Home: www.health.[facility].[place]/diseases/coronavirus/hcp/hospdischarge.pdf    [facility] clinical trials (COVID-19 research studies): clinicalaffairs.[facility]-clinical-trials     Below are the COVID-19 hotlines at the [place] Department of Health ([facility]). Interpreters are available.   o For health questions: Call 714-710-2447 or 1-893.888.1074 (7 a.m. to 7 p.m.)  o For " questions about schools and childcare: Call 977-192-7264 or 1-802.987.5019 (7 a.m. to 7 p.m.)                     Reason for Disposition    [1] COVID-19 EXPOSURE (Close Contact) within last 14 days AND [2] needs COVID-19 lab test to return to work AND [3] NO symptoms    Protocols used: CORONAVIRUS (COVID-19) EXPOSURE-A- 8.4.20

## 2020-10-29 NOTE — PROGRESS NOTES
"Date: 10/28/2020 18:59:29  Clinician: Ramu Short  Clinician NPI: [id number]  Patient: Edda KOHLER  Patient : 1980  Patient Address: [address] [place] [address]  Patient Phone: [phone]  Visit Protocol: URI  Patient Summary:  Edda is a 40 year old ( : 1980 ) female who initiated a [facility] Visit for COVID-19 (Coronavirus) evaluation and screening. When asked the question \"Please sign me up to receive news, health information and promotions from [facility].\", Edda responded \"No\".    Edda states her symptoms started today.   Her symptoms consist of a headache, enlarged lymph nodes, and a sore throat.   Symptom details     Sore throat: Edda reports having mild throat pain (1-3 on a 10 point pain scale), does not have exudate on her tonsils, and can swallow liquids. The lymph nodes in her neck are enlarged. A rash has not appeared on the skin since the sore throat started.     Headache: She states the headache is mild (1-3 on a 10 point pain scale).      Edda denies having ear pain, wheezing, fever, cough, nasal congestion, nausea, facial pain or pressure, myalgias, chills, malaise, teeth pain, ageusia, diarrhea, anosmia, vomiting, and rhinitis. She also denies having recent facial or sinus surgery in the past 60 days and taking antibiotic medication in the past month. She is not experiencing dyspnea.   Precipitating events  Within the past week, Edda has not been exposed to someone with strep throat.   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 In the past 14 days, Edda has worked or volunteered at a hospital or a clinic. Additional job details as reported by the patient (free text): RN, med/surg &amp; ICU at [employer]   In the past 14 days, she has not lived in a congregate living setting.   Edda has had a close contact with a laboratory-confirmed " COVID-19 patient within 14 days of symptom onset. She was exposed at her work. Date Edda was exposed to the laboratory-confirmed COVID-19 patient: 10/27/2020   Additional information about contact with COVID-19 (Coronavirus) patient as reported by the patient (free text): Patient    Since December 2019, Edda has not been diagnosed with lab-confirmed COVID-19 test and has had upper respiratory infection (URI) or influenza-like illness.      Date(s) of previous URI or influenza-like illness (free-text): 02/2020     Symptoms Edda experienced during previous URI or influenza-like illness as reported by the patient (free-text): Fatigue, congestion, cough        Pertinent medical history  Edda does not get yeast infections when she takes antibiotics.   Edda does not need a return to work/school note.   Weight: 210 lbs   Edda does not smoke or use smokeless tobacco.   She denies pregnancy and denies breastfeeding. She is currently menstruating.   Weight: 210 lbs    MEDICATIONS: ondansetron oral, cyclobenzaprine oral, diclofenac oral, ALLERGIES: NKDA  Clinician Response:  Dear Edda,   Your symptoms show that you may have coronavirus (COVID-19). This illness can cause fever, cough and trouble breathing. Many people get a mild case and get better on their own. Some people can get very sick.  What should I do?  We would like to test you for this virus.   1. Please call [phone] to schedule your visit. Explain that you were referred by [facility] to have a COVID-19 test. Be ready to share your [facility] ID number.  The following will serve as your written order for this COVID Test, ordered by me, for the indication of suspected COVID [Z20.828]: The test will be ordered in [employer], our electronic health record, after you are scheduled. It will show as ordered and authorized by Oumar Villanueva MD.  Order: COVID-19 (Coronavirus) PCR for SYMPTOMATIC testing from [facility]   2. When it's time for your COVID test:   "Stay at least 6 feet away from others. (If someone will drive you to your test, stay in the backseat, as far away from the  as you can.)   Cover your mouth and nose with a mask, tissue or washcloth.  Go straight to the testing site. Don't make any stops on the way there or back.      3.Starting now: Stay home and away from others (self-isolate) until:   You've had no fever---and no medicine that reduces fever---for one full day (24 hours). And...   Your other symptoms have gotten better. For example, your cough or breathing has improved. And...   At least 10 days have passed since your symptoms started.       During this time, don't leave the house except for testing or medical care.   Stay in your own room, even for meals. Use your own bathroom if you can.   Stay away from others in your home. No hugging, kissing or shaking hands. No visitors.  Don't go to work, school or anywhere else.    Clean \"high touch\" surfaces often (doorknobs, counters, handles, etc.). Use a household cleaning spray or wipes. You'll find a full list of  on the EPA website: www.epa.gov/pesticide-registration/list-n-disinfectants-use-against-sars-cov-2.   Cover your mouth and nose with a mask, tissue or washcloth to avoid spreading germs.  Wash your hands and face often. Use soap and water.  Caregivers in these groups are at risk for severe illness due to COVID-19:  o People 65 years and older  o People who live in a nursing home or long-term care facility  o People with chronic disease (lung, heart, cancer, diabetes, kidney, liver, immunologic)  o People who have a weakened immune system, including those who:   Are in cancer treatment  Take medicine that weakens the immune system, such as corticosteroids  Had a bone marrow or organ transplant  Have an immune deficiency  Have poorly controlled HIV or AIDS  Are obese (body mass index of 40 or higher)  Smoke regularly   o Caregivers should wear gloves while washing dishes, " handling laundry and cleaning bedrooms and bathrooms.  o Use caution when washing and drying laundry: Don't shake dirty laundry, and use the warmest water setting that you can.  o For more tips, go to www.cdc.gov/coronavirus/2019-ncov/downloads/10Things.pdf.    4.Sign up for [employer]. We know it's scary to hear that you might have COVID-19. We want to track your symptoms to make sure you're okay over the next 2 weeks. Please look for an email from [employer]---this is a free, online program that we'll use to keep in touch. To sign up, follow the link in the email. Learn more at [email or web address]  How can I take care of myself?   Get lots of rest. Drink extra fluids (unless a doctor has told you not to).   Take Tylenol (acetaminophen) for fever or pain. If you have liver or kidney problems, ask your family doctor if it's okay to take Tylenol.   Adults can take either:    650 mg (two 325 mg pills) every 4 to 6 hours, or...   1,000 mg (two 500 mg pills) every 8 hours as needed.    Note: Don't take more than 3,000 mg in one day. Acetaminophen is found in many medicines (both prescribed and over-the-counter medicines). Read all labels to be sure you don't take too much.   For children, check the Tylenol bottle for the right dose. The dose is based on the child's age or weight.    If you have other health problems (like cancer, heart failure, an organ transplant or severe kidney disease): Call your specialty clinic if you don't feel better in the next 2 days.       Know when to call 911. Emergency warning signs include:    Trouble breathing or shortness of breath Pain or pressure in the chest that doesn't go away Feeling confused like you haven't felt before, or not being able to wake up Bluish-colored lips or face.  Where can I get more information?    [employer] [place] -- About COVID-19: [email or web address]   CDC -- What to Do If You're Sick:  www.cdc.gov/coronavirus/2019-ncov/about/steps-when-sick.html   CDC -- Ending Home Isolation: www.cdc.gov/coronavirus/2019-ncov/hcp/disposition-in-home-patients.html   [facility] -- Caring for Someone: www.cdc.gov/coronavirus/2019-ncov/if-you-are-sick/care-for-someone.html   [facility] -- Interim Guidance for Hospital Discharge to Home: www.[facility].mn./diseases/coronavirus/hcp/hospdischarge.pdf   [facility] clinical trials (COVID-19 research studies): clinicalaffairs.[facility]-clinical-trials    Below are the COVID-19 hotlines at the [place] Department of Health ([facility]). Interpreters are available.    For health questions: Call 236-712-1286 or 1-389.467.4880 (7 a.m. to 7 p.m.) For questions about schools and childcare: Call 848-659-7073 or 1-188.182.8377 (7 a.m. to 7 p.m.)    Diagnosis: Contact with and (suspected) exposure to other viral communicable diseases  Diagnosis ICD: Z20.828  Additional Clinician Notes:   If your symptoms are not improving or worsen, please go to one of our urgent care locations for evaluation and possible lab work.&nbsp; Or see your regular doctor.

## 2020-11-05 ENCOUNTER — MYC MEDICAL ADVICE (OUTPATIENT)
Dept: FAMILY MEDICINE | Facility: OTHER | Age: 40
End: 2020-11-05

## 2020-11-05 NOTE — TELEPHONE ENCOUNTER
Patient called the clinic back to discuss further.     She was trying to figure out if getting tested for COVID was worth it. She stated that she has sinus pressure, feels stuffed up, has a headache, and a non-productive cough. She can taste and smell. Denies fevers, sob, or chest pain.     Patient works at the [employer]. She last worked Tuesday. However, her symptoms developed on Monday. Last weekend she did move some hay in the barn.     PLAN:   Patient will continue to keep test as scheduled.     Myah Krueger RN  November 5, 2020

## 2020-11-09 DIAGNOSIS — Z20.822 SUSPECTED COVID-19 VIRUS INFECTION: Primary | ICD-10-CM

## 2020-11-09 DIAGNOSIS — Z20.822 SUSPECTED COVID-19 VIRUS INFECTION: ICD-10-CM

## 2020-11-09 PROCEDURE — U0003 INFECTIOUS AGENT DETECTION BY NUCLEIC ACID (DNA OR RNA); SEVERE ACUTE RESPIRATORY SYNDROME CORONAVIRUS 2 (SARS-COV-2) (CORONAVIRUS DISEASE [COVID-19]), AMPLIFIED PROBE TECHNIQUE, MAKING USE OF HIGH THROUGHPUT TECHNOLOGIES AS DESCRIBED BY CMS-2020-01-R: HCPCS | Performed by: FAMILY MEDICINE

## 2020-11-10 LAB
SARS-COV-2 RNA SPEC QL NAA+PROBE: ABNORMAL
SPECIMEN SOURCE: ABNORMAL

## 2020-11-11 ENCOUNTER — MYC MEDICAL ADVICE (OUTPATIENT)
Dept: FAMILY MEDICINE | Facility: OTHER | Age: 40
End: 2020-11-11

## 2021-03-11 ENCOUNTER — MYC MEDICAL ADVICE (OUTPATIENT)
Dept: FAMILY MEDICINE | Facility: OTHER | Age: 41
End: 2021-03-11

## 2021-03-12 NOTE — TELEPHONE ENCOUNTER
This is something she could likely be seen first in Primary care for management and if complex or not improving then referral to Dermatology.     Funmi Hector PA-C

## 2021-03-16 NOTE — TELEPHONE ENCOUNTER
Routing to ES:    Patient is looking for an Rx for a steroid, cream is not helping. Just until she gets in to see AE.     Please advise    Eulalia Garcia RN

## 2021-03-17 NOTE — TELEPHONE ENCOUNTER
I have never seen her, she needs to be seen prior to prescriptions being given.   She can use OTC cortisone if she would like.     Funmi Hector PA-C

## 2021-03-18 ENCOUNTER — VIRTUAL VISIT (OUTPATIENT)
Dept: FAMILY MEDICINE | Facility: OTHER | Age: 41
End: 2021-03-18
Payer: COMMERCIAL

## 2021-03-18 DIAGNOSIS — L20.9 ATOPIC DERMATITIS, UNSPECIFIED TYPE: Primary | ICD-10-CM

## 2021-03-18 PROCEDURE — 99213 OFFICE O/P EST LOW 20 MIN: CPT | Mod: 95 | Performed by: PHYSICIAN ASSISTANT

## 2021-03-18 RX ORDER — CLOBETASOL PROPIONATE 0.5 MG/G
CREAM TOPICAL 2 TIMES DAILY
Qty: 60 G | Refills: 4 | Status: SHIPPED | OUTPATIENT
Start: 2021-03-18 | End: 2023-08-01

## 2021-03-18 RX ORDER — METHYLPREDNISOLONE 4 MG
TABLET, DOSE PACK ORAL
Qty: 21 TABLET | Refills: 0 | Status: SHIPPED | OUTPATIENT
Start: 2021-03-18 | End: 2022-02-09

## 2021-03-18 RX ORDER — HYDROXYZINE PAMOATE 50 MG/1
50-100 CAPSULE ORAL 3 TIMES DAILY PRN
Qty: 60 CAPSULE | Refills: 3 | Status: SHIPPED | OUTPATIENT
Start: 2021-03-18 | End: 2021-07-08

## 2021-03-18 NOTE — PROGRESS NOTES
Janeen is a 41 year old who is being evaluated via a billable video visit.      How would you like to obtain your AVS? MyChart  If the video visit is dropped, the invitation should be resent by: Text to cell phone: See demographics   Will anyone else be joining your video visit? No     Video Start Time: 8:29 AM    Assessment & Plan     ICD-10-CM    1. Atopic dermatitis, unspecified type  L20.9 methylPREDNISolone (MEDROL DOSEPAK) 4 MG tablet therapy pack     clobetasol (TEMOVATE) 0.05 % external cream     hydrOXYzine (VISTARIL) 50 MG capsule     DERMATOLOGY ADULT REFERRAL     - Patient with known eczema presenting with acute flare   - Not able to fully assess due to video visit   - Clobetasol and oral steroids were last prescribed by dermatology   - Will repeat this   - Reviewed use and side effects   - Also will give Hydroxyzine to use for itching and sleep     Reviewed use and side effects   - Will also give dermatology referral if our treatment plan fails     Review of the result(s) of each unique test - None   Diagnosis or treatment significantly limited by social determinants of health - None     20 minutes spent on the date of the encounter doing chart review, history and exam, documentation and further activities as noted above    The patient indicates understanding of these issues and agrees with the plan.    Follow up: with dermatology if doesn't improve     KATHY [facility]   Janeen is a 41 year old who presents for the following health issues     HPI    Rash  Onset/Duration: 2 weeks   Description  Location: arms, leg, groin   Character: blotchy, raised, red  Itching: severe  Intensity:  moderate  Progression of Symptoms:  worsening  Accompanying signs and symptoms:   Fever: no  Body aches or joint pain: no  Sore throat symptoms: no  Recent cold symptoms: no  History:           Previous episodes of similar rash: Yes, about 3 years ago    New exposures:  None  Recent travel: no  Exposure to similar rash: no  Precipitating or alleviating factors: None   Therapies tried and outcome: See below     - Very similar to issues in the past, 3 years ago went to dermatologist but never had to get to that point   - Calmed down after steroids so didn't need biopsy   - Needs Clobetasol      Has been using this   - Can't sleep, wakes up itching         RN Note from today   Onset of rash was 2 week ago.  Location of the rash: arm, lower, groin and lower legs.  Associated symptoms include: fatigue, itching and redness.  Symptoms appear to be worsening.   Therapies tried to improve the rash: Cold, lotions, Vaseline, steroid ointment, hydrocortisone.  Previous history of a similar rash? Yes: HX of Exzema  Recent exposure history: none known      Myah Krueger RN, BSN  [place]  March 18, 2021      Review of Systems   Constitutional, HEENT, cardiovascular, pulmonary, gi and gu systems are negative, except as otherwise noted.      Objective       Vitals:  No vitals were obtained today due to virtual visit.    Physical Exam   GENERAL: Healthy, alert and no distress  EYES: Eyes grossly normal to inspection.  No discharge or erythema, or obvious scleral/conjunctival abnormalities.  RESP: No audible wheeze, cough, or visible cyanosis.  No visible retractions or increased work of breathing.    SKIN:  Not able to fully assess due to video quality   Erythematous patches on arms of various sizes   Visible skin clear. No significant rash, abnormal pigmentation or lesions.  NEURO: Cranial nerves grossly intact.  Mentation and speech appropriate for age.  PSYCH: Mentation appears normal, affect normal/bright, judgement and insight intact, normal speech and appearance well-groomed.      Diagnostics   None       Video-Visit Details    Type of service:  Video Visit    Video End Time:8:37 AM    Originating Location (pt. Location): Home    Distant Location  (provider location):  [facility] - Provider off site     Platform used for Video Visit: Crystal

## 2021-04-29 ENCOUNTER — MYC MEDICAL ADVICE (OUTPATIENT)
Dept: FAMILY MEDICINE | Facility: OTHER | Age: 41
End: 2021-04-29

## 2021-04-29 DIAGNOSIS — M54.42 LEFT-SIDED LOW BACK PAIN WITH LEFT-SIDED SCIATICA, UNSPECIFIED CHRONICITY: ICD-10-CM

## 2021-04-29 RX ORDER — CYCLOBENZAPRINE HCL 10 MG
10 TABLET ORAL 3 TIMES DAILY PRN
Qty: 30 TABLET | Refills: 0 | Status: SHIPPED | OUTPATIENT
Start: 2021-04-29 | End: 2021-07-08

## 2021-04-29 NOTE — TELEPHONE ENCOUNTER
Flexeril      Last Written Prescription Date:  9/23/20  Last Fill Quantity: 30,   # refills: 0  Last Office Visit: 3/18/21  Future Office visit:       Routing refill request to provider for review/approval because:  Drug not on the [facility], P or [facility] refill protocol or controlled substance

## 2021-06-24 ENCOUNTER — MYC MEDICAL ADVICE (OUTPATIENT)
Dept: FAMILY MEDICINE | Facility: OTHER | Age: 41
End: 2021-06-24

## 2021-06-25 NOTE — TELEPHONE ENCOUNTER
[employer] message read with no response.  Encounter closed.    Renea Sifuentes RN on 6/25/2021 at 8:06 AM

## 2021-07-07 ENCOUNTER — MYC MEDICAL ADVICE (OUTPATIENT)
Dept: FAMILY MEDICINE | Facility: OTHER | Age: 41
End: 2021-07-07

## 2021-07-07 NOTE — TELEPHONE ENCOUNTER
Needs to be seen.    Please call and help schedule.    Sony Davis, FRANCYN, RN, PHN  [facility] ~ Registered Nurse  Clinic Triage ~ [place] [facility] [place]  July 7, 2021

## 2021-07-08 ENCOUNTER — VIRTUAL VISIT (OUTPATIENT)
Dept: FAMILY MEDICINE | Facility: CLINIC | Age: 41
End: 2021-07-08
Payer: COMMERCIAL

## 2021-07-08 ENCOUNTER — MYC MEDICAL ADVICE (OUTPATIENT)
Dept: FAMILY MEDICINE | Facility: CLINIC | Age: 41
End: 2021-07-08

## 2021-07-08 DIAGNOSIS — M54.42 LEFT-SIDED LOW BACK PAIN WITH LEFT-SIDED SCIATICA, UNSPECIFIED CHRONICITY: Primary | ICD-10-CM

## 2021-07-08 PROCEDURE — 99214 OFFICE O/P EST MOD 30 MIN: CPT | Mod: 95 | Performed by: PHYSICIAN ASSISTANT

## 2021-07-08 RX ORDER — METHOCARBAMOL 750 MG/1
750 TABLET, FILM COATED ORAL 4 TIMES DAILY PRN
Qty: 30 TABLET | Refills: 0 | Status: SHIPPED | OUTPATIENT
Start: 2021-07-08 | End: 2022-05-11

## 2021-07-08 RX ORDER — METHYLPREDNISOLONE 4 MG
TABLET, DOSE PACK ORAL
Qty: 21 TABLET | Refills: 0 | Status: SHIPPED | OUTPATIENT
Start: 2021-07-08 | End: 2022-02-09

## 2021-07-08 ASSESSMENT — ASTHMA QUESTIONNAIRES
ACT_TOTALSCORE: 25
QUESTION_3 LAST FOUR WEEKS HOW OFTEN DID YOUR ASTHMA SYMPTOMS (WHEEZING, COUGHING, SHORTNESS OF BREATH, CHEST TIGHTNESS OR PAIN) WAKE YOU UP AT NIGHT OR EARLIER THAN USUAL IN THE MORNING: NOT AT ALL
QUESTION_4 LAST FOUR WEEKS HOW OFTEN HAVE YOU USED YOUR RESCUE INHALER OR NEBULIZER MEDICATION (SUCH AS ALBUTEROL): NOT AT ALL
QUESTION_5 LAST FOUR WEEKS HOW WOULD YOU RATE YOUR ASTHMA CONTROL: COMPLETELY CONTROLLED
QUESTION_2 LAST FOUR WEEKS HOW OFTEN HAVE YOU HAD SHORTNESS OF BREATH: NOT AT ALL
QUESTION_1 LAST FOUR WEEKS HOW MUCH OF THE TIME DID YOUR ASTHMA KEEP YOU FROM GETTING AS MUCH DONE AT WORK, SCHOOL OR AT HOME: NONE OF THE TIME

## 2021-07-08 ASSESSMENT — PAIN SCALES - GENERAL: PAINLEVEL: MILD PAIN (3)

## 2021-07-08 NOTE — PROGRESS NOTES
"Janeen is a 41 year old who is being evaluated via a billable video visit.      How would you like to obtain your AVS? MyChart  If the video visit is dropped, the invitation should be resent by: Text to cell phone: [phone]   Will anyone else be joining your video visit? No      Video Start Time: 1:25pm    Assessment & Plan     Left-sided low back pain with left-sided sciatica, unspecified chronicity  Steroid/prednisone burst therapy- possible side effects discussed. Do not take with NSAIDs.  Flexeril not working well, would like to try alternative. Sent rx for robaxin.   Muscle relaxers primarily for use at bedtime- precautions discussed- no driving or taking with alcohol.   Referral to PT- encouraged developing regimen of core and back strengthening exercises to reduce exacerbations  S/sx for f/u if worsening discussed and in AVS  Discussed gabapentin if radicular sx persist   - [facility]; Future  - methylPREDNISolone (MEDROL DOSEPAK) 4 MG tablet therapy pack; Follow Package Directions  - methocarbamol (ROBAXIN) 750 MG tablet; Take 1 tablet (750 mg) by mouth 4 times daily as needed for muscle spasms     BMI:   Estimated body mass index is 35.29 kg/m  as calculated from the following:    Height as of 8/19/20: 1.67 m (5' 5.75\").    Weight as of 8/19/20: 98.4 kg (217 lb).   Weight management plan: Patient was referred to their PCP to discuss a diet and exercise plan.    Follow Up: The patient was instructed to contact clinic for worsening symptoms, non-improvement in time frame as expected/discussed, and for questions regarding medications or treatment plan. For virtual visits, the patient was advised to be seen for in person evaluation if symptoms or condition are worsening or non-improvement as expected.       No follow-ups on file.    Rupa Mendez PA-C  [facility] SRINIVAS [place] is a 41 year old who presents for the following health issues     History of Present " Illness       Back Pain:  She presents for follow up of back pain. Patient's back pain is a chronic problem.  Location of back pain:  Right lower back, left lower back, right buttock, left buttock, right hip, left hip, right side of waist and left side of waist  Description of back pain: burning, sharp, shooting and stabbing  Back pain spreads: right buttocks, left buttocks and left thigh    Since patient first noticed back pain, pain is: always present, but gets better and worse  Does back pain interfere with her job:  Yes      She eats 2-3 servings of fruits and vegetables daily.She consumes 0 sweetened beverage(s) daily.She exercises with enough effort to increase her heart rate 30 to 60 minutes per day.  She exercises with enough effort to increase her heart rate 5 days per week. She is missing 3 dose(s) of medications per week.  She is not taking prescribed medications regularly due to remembering to take.     Chronic back pain  Has had back pain for a long time. Started in her pregnancy 19 yr ago.   Worsening in the last several years.  Most of the time is ok, but flares where the nerve is irritated  With flares- pain radiates into the left buttock and down into the left leg. Does not feel past knee.   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   If she goes home and rests, it settles but this time has not been settling down.   Today a bit better since didn't work last night.  Has tried the flexeril this time and not helped much.   just started chiro adjustments  Denies N/T, weakness in the leg.   Denies bowel or bladder.  Denies F/C.   MRI 04/11/2019- Steroid injection at that time  Rides horses competitively in the summer. Which is hard on her back.   Ibuprofen 800mg and tylenol  Lidocaine patches  Heat better than cold     Review of Systems   Constitutional, HEENT, cardiovascular, pulmonary, gi and gu systems are negative, except as otherwise noted.      Objective            Vitals:  No vitals were obtained today due to virtual visit.    Physical Exam   GENERAL: Healthy, alert and no distress  EYES: Eyes grossly normal to inspection.  No discharge or erythema, or obvious scleral/conjunctival abnormalities.  HENT: Normal cephalic/atraumatic.  External ears, nose and mouth without ulcers or lesions.  No nasal drainage visible.  RESP: No audible wheeze, cough, or visible cyanosis.  No visible retractions or increased work of breathing.    SKIN: Visible skin clear. No significant rash, abnormal pigmentation or lesions.  NEURO: Cranial nerves grossly intact.  Mentation and speech appropriate for age.  PSYCH: Mentation appears normal, affect normal/bright, judgement and insight intact, normal speech and appearance well-groomed.                Video-Visit Details    Type of service:  Video Visit    Video End Time:1:41 PM    Originating Location (pt. Location): Home    Distant Location (provider location):  [facility]     Platform used for Video Visit: Chartbeat

## 2021-07-08 NOTE — PATIENT INSTRUCTIONS
You were prescribed a steroid. This is a strong anti-inflammatory.   Primary side effects can include insomnia (trouble with sleep), increased appetite, and irritability/moodiness.  By dosing this medication earlier in the day (taking in the morning and/or at lunch) can help reduce the sleep side effects.    Do not take NSAIDs while taking prednisone (examples of NSAIDs: ibuprofen, Advil, Aleve, naproxen, diclofenac, celebrex, etc).      You were prescribed a muscle relaxer. This can make you dizzy and/or drowsy.   Do NOT drink alcohol while taking.   Try for the first time at home (ie before bed) so you know how it affects you.   Do not drive while taking if you feel dizzy or drowsy.

## 2021-07-09 ASSESSMENT — ASTHMA QUESTIONNAIRES: ACT_TOTALSCORE: 25

## 2021-07-12 NOTE — TELEPHONE ENCOUNTER
Left pt a vm to [facility] in clinic visit and address concerns for pain medication. Gave clinic number for follow up

## 2021-07-12 NOTE — TELEPHONE ENCOUNTER
Left  for pt to [facility] an in clinic visit and provided clinic contact info.    Next Steps: [facility] pt for an in clinic visit to go over injury concerns and address concerns for medication.

## 2021-07-13 NOTE — TELEPHONE ENCOUNTER
Patient had a virtual visit with Rupa on 07/8/21. Advised to contact clinic to follow-up.   Called to  for the patient to see how she is feeling. Third reach out. Closing encounter.     Myah Krueger RN, BSN  [place] [facility] [place]  July 13, 2021

## 2021-10-10 ENCOUNTER — HEALTH MAINTENANCE LETTER (OUTPATIENT)
Age: 41
End: 2021-10-10

## 2021-10-26 NOTE — PROGRESS NOTES
Outpatient Physical Therapy Discharge Note     Patient: Edda Schmidt  : 1980    Beginning/End Dates of Reporting Period:  10/22/20     Referring Provider:     Therapy Diagnosis: stress incontinence     Client Self Report: see eval    Objective Measurements:  Not known pt never returned to PT      Goals:  Not known if met pt never returned after the eval    Plan:  Discharge from therapy.    Discharge:    Reason for Discharge: Patient has failed to schedule further appointments.        Thank you for the referral,            Loreto Washington PT

## 2021-11-22 ENCOUNTER — HOSPITAL ENCOUNTER (OUTPATIENT)
Dept: MAMMOGRAPHY | Facility: CLINIC | Age: 41
Discharge: HOME | End: 2021-11-22
Attending: FAMILY MEDICINE | Admitting: FAMILY MEDICINE
Payer: COMMERCIAL

## 2021-11-22 DIAGNOSIS — Z12.31 VISIT FOR SCREENING MAMMOGRAM: ICD-10-CM

## 2021-11-22 PROCEDURE — 77063 BREAST TOMOSYNTHESIS BI: CPT

## 2021-11-24 ENCOUNTER — TELEPHONE (OUTPATIENT)
Dept: UROLOGY | Facility: CLINIC | Age: 41
End: 2021-11-24

## 2021-11-24 ENCOUNTER — OFFICE VISIT (OUTPATIENT)
Dept: OBGYN | Facility: CLINIC | Age: 41
End: 2021-11-24
Payer: COMMERCIAL

## 2021-11-24 VITALS
SYSTOLIC BLOOD PRESSURE: 139 MMHG | HEART RATE: 60 BPM | DIASTOLIC BLOOD PRESSURE: 80 MMHG | BODY MASS INDEX: 36.11 KG/M2 | WEIGHT: 222 LBS

## 2021-11-24 DIAGNOSIS — N39.3 STRESS INCONTINENCE OF URINE: Primary | ICD-10-CM

## 2021-11-24 DIAGNOSIS — N90.89 VULVAR IRRITATION: ICD-10-CM

## 2021-11-24 LAB
ALBUMIN UR-MCNC: NEGATIVE MG/DL
APPEARANCE UR: CLEAR
BILIRUB UR QL STRIP: NEGATIVE
COLOR UR AUTO: YELLOW
GLUCOSE UR STRIP-MCNC: NEGATIVE MG/DL
HGB UR QL STRIP: ABNORMAL
KETONES UR STRIP-MCNC: ABNORMAL MG/DL
LEUKOCYTE ESTERASE UR QL STRIP: NEGATIVE
NITRATE UR QL: NEGATIVE
PH UR STRIP: 5.5 [PH] (ref 5–7)
RBC #/AREA URNS AUTO: ABNORMAL /HPF
SP GR UR STRIP: 1.02 (ref 1–1.03)
SQUAMOUS #/AREA URNS AUTO: ABNORMAL /LPF
UROBILINOGEN UR STRIP-ACNC: 0.2 E.U./DL
WBC #/AREA URNS AUTO: ABNORMAL /HPF

## 2021-11-24 PROCEDURE — 81001 URINALYSIS AUTO W/SCOPE: CPT | Performed by: OBSTETRICS & GYNECOLOGY

## 2021-11-24 PROCEDURE — 99204 OFFICE O/P NEW MOD 45 MIN: CPT | Performed by: OBSTETRICS & GYNECOLOGY

## 2021-11-24 RX ORDER — CLOBETASOL PROPIONATE 0.5 MG/G
OINTMENT TOPICAL AT BEDTIME
Qty: 60 G | Refills: 0 | Status: SHIPPED | OUTPATIENT
Start: 2021-11-24 | End: 2022-09-07

## 2021-11-24 NOTE — TELEPHONE ENCOUNTER
Writer spoke with Urology care team. Advised Dr. Sherman can do that procedure in [place], and patient can request this. Writer called patient back and scheduled video consult on 1/3/22 at 11:00 am.

## 2021-11-24 NOTE — TELEPHONE ENCOUNTER
Health Call Center    Phone Message    May a detailed message be left on voicemail: yes     Reason for Call: The patient called to schedule referral for Stress incontinence of urine [N39.3]. [facility] she may need to have a sling surgically placed. She would like to know where this procedure would potentially take place? She said she does not want to travel farther south than [place]. Please advise. Thank you.    Action Taken: Message routed to:  Adult Clinics: Urology p [id number]    Travel Screening: Not Applicable

## 2021-11-24 NOTE — PROGRESS NOTES
SUBJECTIVE:       HPI: Edda Schmidt is a 41 year old  who presents today for consultation for urinary incontinence.     Urinary incontinence worsening for the last few years.   Voiding every 3-4 hours.  Unable to hold urine for long, able to make it the bathroom. 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  Leaking with running, coughing, sneezing.  Caffeine use- 2-3 cups coffee in AM  Triggers- Occasional spicy foods, no tea. Some chocolate intake  Prior urology evaluation- none  Prolapse sx- no  Was wearing daily pads for incontinence, however, was causing vulvar irritation and is now on clobetasol  Was previously using vaseline on the outside but not recently  When showering, using homemade soap (not directly scrubbing on vulva) otherwise using cetaphil.  Has done one session of pelvic PT but then had covid and has not been back. Was not very comfortable with session.      Ob Hx:  s/p   OB History    Para Term  AB Living   5 2 2 0 3 1   SAB IAB Ectopic Multiple Live Births   3 0 0 0 1      # Outcome Date GA Lbr Stanley/2nd Weight Sex Delivery Anes PTL Lv   5 SAB      SAB      4 SAB      SAB      3 SAB      SAB      2 Term      CS-Unspec      1 Term         ROBBIE    .      Gyn Hx: Patient's last menstrual period was 2021.    Patient is sexually active. One female partner   Last pap was 2019 nil neg hpv  Using none for contraception.   Menses every 28 days. reg flow.           reports that she has never smoked. She has never used smokeless tobacco.      Today's PHQ-2 Score:   PHQ-2 (  [employer]) 2021   Q1: Little interest or pleasure in doing things 1   Q2: Feeling down, depressed or hopeless 1   PHQ-2 Score 2   PHQ-2 Total Score (12-17 Years)- Positive if 3 or more points; Administer PHQ-A if positive 2   Q1: Little interest or pleasure in doing things Several days   Q2: Feeling down, depressed or hopeless Several days   PHQ-2 Score 2     Today's PHQ-9 Score: No flowsheet data  found.  Today's WILL-7 Score: No flowsheet data found.    Problem list and histories reviewed & adjusted, as indicated.  Additional history: as documented.    Patient Active Problem List   Diagnosis     Asthma     Menstrual migraine     Left-sided low back pain with left-sided sciatica     Car sickness     Segmental dysfunction of cervical region     Segmental dysfunction of thoracic region     Segmental dysfunction of sacral region     Mechanical back pain     Past Surgical History:   Procedure Laterality Date     INJECT EPIDURAL LUMBAR Left 5/10/2019    Procedure: left sided Lumbar 4-5 translaminar injection;  Surgeon: Jordy Tanner MD;  Location:  OR      Social History     Tobacco Use     Smoking status: Never Smoker     Smokeless tobacco: Never Used   Substance Use Topics     Alcohol use: Yes           clobetasol (TEMOVATE) 0.05 % external cream, Apply topically 2 times daily  methocarbamol (ROBAXIN) 750 MG tablet, Take 1 tablet (750 mg) by mouth 4 times daily as needed for muscle spasms  Misc Natural Products (COSAMIN ASU FOR JOINT HEALTH PO),   ondansetron (ZOFRAN) 8 MG tablet, Take 1 tablet (8 mg) by mouth every 8 hours as needed for nausea  diclofenac (VOLTAREN) 75 MG EC tablet, Take 1 tablet (75 mg) by mouth 2 times daily (Patient not taking: Reported on 11/24/2021)  methylPREDNISolone (MEDROL DOSEPAK) 4 MG tablet therapy pack, Follow Package Directions  methylPREDNISolone (MEDROL DOSEPAK) 4 MG tablet therapy pack, Follow Package Directions    No current facility-administered medications on file prior to visit.    No Known Allergies    ROS:  10 Point review of systems negative other noted above in HPI    OBJECTIVE:     /80   Pulse 60   Wt 100.7 kg (222 lb)   LMP 11/03/2021   BMI 36.11 kg/m    Body mass index is 36.11 kg/m .      Gen: Alert, oriented, appropriately interactive, NAD  Eyes: Eyes grossly normal to inspection, conjunctivae and sclerae normal  Resp: no audible wheeze, cough, or  visible cyanosis.  No visible retractions or increased work of breathing.  Able to speak fully in complete sentences.  Abdomen: soft, non tender, non distended  External genitalia: no lesions; normal appearing external genitalia, bartholins glands, urethra, skenes glands  Vagina: no masses or lesions or discharge, normally rugated.  Cervix: no masses or lesions or discharge   Bimanual exam:   Nontender pelvic floor muscles  Urethra: nontender   Bladder: nontender and without massess, well supported   Uterus: midline, anteverted, small, mobile  no masses, non-tender  Adnexa: no masses or tenderness appreciated   No cervical motion tenderness  MSK: normal gait, symmetric movements UE & LE  Neuro: Cranial nerves grossly intact, mentation intact and speech normal  Psych: mentation appears normal, affect normal/bright, judgement and insight intact, normal speech and appearance well-groomed      In-Clinic Test Results:  Results for orders placed or performed in visit on 21 (from the past 24 hour(s))   UA with Microscopic reflex to Culture - lab collect    Specimen: Urine, Midstream   Result Value Ref Range    Color Urine Yellow Colorless, Straw, Light Yellow, Yellow    Appearance Urine Clear Clear    Glucose Urine Negative Negative mg/dL    Bilirubin Urine Negative Negative    Ketones Urine Trace (A) Negative mg/dL    Specific Gravity Urine 1.020 1.003 - 1.035    Blood Urine Small (A) Negative    pH Urine 5.5 5.0 - 7.0    Protein Albumin Urine Negative Negative mg/dL    Urobilinogen Urine 0.2 0.2, 1.0 E.U./dL    Nitrite Urine Negative Negative    Leukocyte Esterase Urine Negative Negative   Urine Microscopic   Result Value Ref Range    RBC Urine 0-2 0-2 /HPF /HPF    WBC Urine None Seen 0-5 /HPF /HPF    Squamous Epithelials Urine Few (A) None Seen /LPF    Narrative    Urine Culture not indicated       ASSESSMENT/PLAN:                                                      Edda Schmidt is a 41 year old  who  "presents today for consultation for urinary incontinence      ICD-10-CM    1. Stress incontinence of urine  N39.3 UA with Microscopic reflex to Culture - lab collect     UA with Microscopic reflex to Culture - lab collect     Urine Microscopic     Adult Urology Referral   2. Vulvar irritation  N90.89 clobetasol (TEMOVATE) 0.05 % external ointment           ALISSA. No prior urology evaluation, with progressively worsening sx. Options for management reviewed, including conservative such as PT vs surgical. Patient interested in surgical treatment. Will refer to urology to discuss options further. In the meantime, reviewed avoiding triggers, starting a bladder diary and timed voiding.  UA ordered today.    Chronic vulvar irritation, worsened with incontinence, use of pads. Discussed vulvar and vaginal hygiene practices. Advised to avoid Over the counter \"feminine\" soaps/sprays. Instead, recommend Cetaphil cleanser or other gentle ph-neutral cleanser. No need to use any products inside of the vagina. Furthermore, advised ensuring that she is using incontinence-specific pads, rather than menstrual; and, to change more than once a day even if not saturated.    All questions answered, patient in agreement with plan.      Shaniqua Durand, DO  [facility]   "

## 2021-12-20 ENCOUNTER — MYC MEDICAL ADVICE (OUTPATIENT)
Dept: OBGYN | Facility: CLINIC | Age: 41
End: 2021-12-20
Payer: COMMERCIAL

## 2021-12-20 NOTE — TELEPHONE ENCOUNTER
Pt last seen 11/24/2021 for stress incontinence and vulvar irritation.    Tara Villanueva RN on 12/20/2021 at 10:53 AM

## 2022-01-01 NOTE — ANESTHESIA POSTPROCEDURE EVALUATION
Patient: Edda Schmidt    Procedure(s):  left sided Lumbar 4-5 translaminar injection    Diagnosis:Lumbar disc herniation with radiculopathy  Diagnosis Additional Information: No value filed.    Anesthesia Type:  MAC    Note:  Anesthesia Post Evaluation    Patient location during evaluation: PACU  Patient participation: Able to fully participate in evaluation  Level of consciousness: awake  Pain management: adequate  Airway patency: patent  Cardiovascular status: acceptable and hemodynamically stable  Respiratory status: acceptable, room air and nonlabored ventilation  Hydration status: stable  PONV: none     Anesthetic complications: None    Comments: Patient was happy with the anesthesia care received and no anesthesia related complications were noted.  I will follow up with the patient again if it is needed.        Last vitals:  Vitals:    05/10/19 1200 05/10/19 1215 05/10/19 1230   BP: 111/64 110/68 114/74   Pulse:  (!) 48 51   Resp:      Temp:      SpO2:  93% 98%         Electronically Signed By: SOURAV Moreno CRNA  May 10, 2019  1:12 PM   Statement Selected

## 2022-01-03 ENCOUNTER — VIRTUAL VISIT (OUTPATIENT)
Dept: UROLOGY | Facility: CLINIC | Age: 42
End: 2022-01-03
Attending: OBSTETRICS & GYNECOLOGY
Payer: COMMERCIAL

## 2022-01-03 DIAGNOSIS — N39.3 STRESS INCONTINENCE OF URINE: ICD-10-CM

## 2022-01-03 DIAGNOSIS — N39.3 STRESS INCONTINENCE: Primary | ICD-10-CM

## 2022-01-03 PROCEDURE — 99205 OFFICE O/P NEW HI 60 MIN: CPT | Mod: 95 | Performed by: UROLOGY

## 2022-01-03 NOTE — PROGRESS NOTES
Janeen is a 41 year old who is being evaluated via a billable video visit.      How would you like to obtain your AVS? MyChart  If the video visit is dropped, the invitation should be resent by: Text to cell phone: cell phone  Will anyone else be joining your video visit? No      Video Start Time: 11:10  AM  Video-Visit Details    Type of service:  Video Visit    Video End Time:11:28 AM    Originating Location (pt. Location): Home    Distant Location (provider location):  [facility]     Platform used for Video Visit: Rabixo

## 2022-01-03 NOTE — PROGRESS NOTES
HPI:  Edda Schmidt is a 41 year old female being seen for urinary incontinence.  It is mainly associated with activity and has gotten worse over the years.  She has tried doing Kegel exercises but they have not helped.  She even tried PFPT but was only able to continue and did not feel like it helped.    Reviewed previous notes from Dr. Durand on 11/24/21     Exam:  There were no vitals taken for this visit.  GENERAL: Healthy, alert and no distress  EYES: Eyes grossly normal to inspection.  No discharge or erythema, or obvious scleral/conjunctival abnormalities.  RESP: No audible wheeze, cough, or visible cyanosis.  No visible retractions or increased work of breathing.    SKIN: Visible skin clear. No significant rash, abnormal pigmentation or lesions.  NEURO: Cranial nerves grossly intact.  Mentation and speech appropriate for age.  PSYCH: Mentation appears normal, affect normal/bright, judgement and insight intact, normal speech and appearance well-groomed.    Review of Labs:  Color Urine (no units)   Date Value   11/24/2021 Yellow     Appearance Urine (no units)   Date Value   11/24/2021 Clear     Glucose Urine (mg/dL)   Date Value   11/24/2021 Negative     Bilirubin Urine (no units)   Date Value   11/24/2021 Negative     Ketones Urine (mg/dL)   Date Value   11/24/2021 Trace (A)     Specific Gravity Urine (no units)   Date Value   11/24/2021 1.020     pH Urine (no units)   Date Value   11/24/2021 5.5     Protein Albumin Urine (mg/dL)   Date Value   11/24/2021 Negative     Urobilinogen Urine (E.U./dL)   Date Value   11/24/2021 0.2     Nitrite Urine (no units)   Date Value   11/24/2021 Negative     Leukocyte Esterase Urine (no units)   Date Value   11/24/2021 Negative         Assessment & Plan   40 y/o female with stress urinary incontinence that has become worse.  We have discussed options of observation, continued PT, pelvital flyte, and midurethral sling.   She would like to proceed with midurethral  sling.  Her vaginal area is very irritated from the incontinence.  We discussed the use of mesh in surgery and the risks which include but are not limited to infection, bleeding, exposure of mesh, vaginal pain, injury to the bladder/urethra/rectum or any structures in the abdomen or pelvis, as well as risk of incontinence or urinary retention. There is also risk of need for catheterization and possible further surgery.   We discussed the FDA public health notification at length.  The pt. Verbalized understanding and had a chance to ask her questions which were all answered.  -schedule midurethral sling.    Jeremy Sherman MD  [facility]      ==========================      Additional Coding Information:    Time spent:  70 minutes spent on the date of the encounter doing chart review, history and exam, documentation and further activities per the note

## 2022-02-01 ENCOUNTER — TELEPHONE (OUTPATIENT)
Dept: UROLOGY | Facility: CLINIC | Age: 42
End: 2022-02-01
Payer: COMMERCIAL

## 2022-02-01 ENCOUNTER — HOSPITAL ENCOUNTER (OUTPATIENT)
Facility: AMBULATORY SURGERY CENTER | Age: 42
End: 2022-02-01
Attending: UROLOGY | Admitting: UROLOGY
Payer: COMMERCIAL

## 2022-02-01 DIAGNOSIS — N39.3 STRESS INCONTINENCE OF URINE: ICD-10-CM

## 2022-02-01 NOTE — TELEPHONE ENCOUNTER
Date Scheduled: 3-14-22  Facility: [facility]  Surgeon: Dr. Sherman   Post-op appointment scheduled:    scheduled?: No  Surgery packet/instructions confirmed received?  No  Special Considerations:   Dayna Mcallister, Surgery Scheduling Coordinator

## 2022-02-01 NOTE — TELEPHONE ENCOUNTER
Received message from patient that she would like to schedule surgery with Dr. Sherman in . I left her my direct phone number and the surgery dates for Dr. Sherman in [place].  Dayna Mcallister, Surgery Scheduling Coordinator

## 2022-02-02 ENCOUNTER — TELEPHONE (OUTPATIENT)
Dept: UROLOGY | Facility: CLINIC | Age: 42
End: 2022-02-02
Payer: COMMERCIAL

## 2022-02-02 NOTE — TELEPHONE ENCOUNTER
Left voice message for patient to schedule surgery with Dr. Sherman. Currently scheduled on Monday 3/14/22 at [facility], patient would like a sooner date.     Danna Hinds  [facility]-Op Coordinator for Urology Surgery    [phone]     Infection due to SARS-CoV-2. Admitted for covid + Pt is a 59Y male admitted for Covid HTN; COVID-19 Yes

## 2022-02-03 ENCOUNTER — MYC MEDICAL ADVICE (OUTPATIENT)
Dept: FAMILY MEDICINE | Facility: OTHER | Age: 42
End: 2022-02-03
Payer: COMMERCIAL

## 2022-02-03 DIAGNOSIS — Z11.59 ENCOUNTER FOR SCREENING FOR OTHER VIRAL DISEASES: Primary | ICD-10-CM

## 2022-02-03 NOTE — TELEPHONE ENCOUNTER
Dr Nicole is there somewhere in your schedule we can schedule her pre-op, I see some same days and hospital follow up's but not sure if that allows you enough time. Please advise

## 2022-02-04 ENCOUNTER — TELEPHONE (OUTPATIENT)
Dept: UROLOGY | Facility: CLINIC | Age: 42
End: 2022-02-04
Payer: COMMERCIAL

## 2022-02-04 NOTE — TELEPHONE ENCOUNTER
Same time for all follow-up appts. The hospital follow-up /preop is reserved for this kind of thing specifically if they are open. Otherwise you can use approval req spots if needed, though there are spots reserved specifically for this.  Rosetta Nicole MD

## 2022-02-04 NOTE — TELEPHONE ENCOUNTER
Patient is scheduled for surgery with Dr. Sherman     Spoke with: Patient via phone     Date of Surgery: Tuesday February 15, 2022     Location: [facility] OR      Informed patient they will need an adult : Yes     Pre-op: Yes      H&P: Patient to schedule      Pre-procedure COVID-19 Test: Friday February 11, 2022 at [facility]     Post-op: 2 and 6 weeks     Additional imaging/appointments: N/A     Surgery packet: [employer]      Additional comments:

## 2022-02-04 NOTE — TELEPHONE ENCOUNTER
Called and spoke to pt. Pt informed of appts scheduled. Pt is able to attend and requested 1st be switched to VV and writer was able to do so.

## 2022-02-08 ENCOUNTER — LAB (OUTPATIENT)
Dept: LAB | Facility: CLINIC | Age: 42
End: 2022-02-08
Payer: COMMERCIAL

## 2022-02-08 DIAGNOSIS — N39.3 STRESS INCONTINENCE OF URINE: Primary | ICD-10-CM

## 2022-02-08 DIAGNOSIS — N39.3 STRESS INCONTINENCE OF URINE: ICD-10-CM

## 2022-02-08 LAB
ALBUMIN UR-MCNC: NEGATIVE MG/DL
APPEARANCE UR: ABNORMAL
BILIRUB UR QL STRIP: NEGATIVE
COLOR UR AUTO: YELLOW
GLUCOSE UR STRIP-MCNC: NEGATIVE MG/DL
HGB UR QL STRIP: ABNORMAL
KETONES UR STRIP-MCNC: NEGATIVE MG/DL
LEUKOCYTE ESTERASE UR QL STRIP: NEGATIVE
MUCOUS THREADS #/AREA URNS LPF: PRESENT /LPF
NITRATE UR QL: NEGATIVE
PH UR STRIP: 5.5 [PH] (ref 5–7)
RBC URINE: 12 /HPF
SP GR UR STRIP: >=1.03 (ref 1–1.03)
SQUAMOUS EPITHELIAL: <1 /HPF
UROBILINOGEN UR STRIP-MCNC: NORMAL MG/DL
WBC URINE: 0 /HPF

## 2022-02-08 PROCEDURE — 81001 URINALYSIS AUTO W/SCOPE: CPT

## 2022-02-08 PROCEDURE — 87086 URINE CULTURE/COLONY COUNT: CPT

## 2022-02-09 ENCOUNTER — OFFICE VISIT (OUTPATIENT)
Dept: FAMILY MEDICINE | Facility: OTHER | Age: 42
End: 2022-02-09
Payer: COMMERCIAL

## 2022-02-09 VITALS
BODY MASS INDEX: 35.36 KG/M2 | TEMPERATURE: 97.2 F | HEART RATE: 58 BPM | DIASTOLIC BLOOD PRESSURE: 80 MMHG | HEIGHT: 66 IN | WEIGHT: 220 LBS | RESPIRATION RATE: 20 BRPM | SYSTOLIC BLOOD PRESSURE: 118 MMHG | OXYGEN SATURATION: 99 %

## 2022-02-09 DIAGNOSIS — J45.20 MILD INTERMITTENT ASTHMA WITHOUT COMPLICATION: ICD-10-CM

## 2022-02-09 DIAGNOSIS — Z01.818 PREOP GENERAL PHYSICAL EXAM: Primary | ICD-10-CM

## 2022-02-09 DIAGNOSIS — Z11.59 NEED FOR HEPATITIS C SCREENING TEST: ICD-10-CM

## 2022-02-09 DIAGNOSIS — Z11.4 SCREENING FOR HIV (HUMAN IMMUNODEFICIENCY VIRUS): ICD-10-CM

## 2022-02-09 DIAGNOSIS — Z13.220 LIPID SCREENING: ICD-10-CM

## 2022-02-09 LAB — BACTERIA UR CULT: NO GROWTH

## 2022-02-09 PROCEDURE — 99213 OFFICE O/P EST LOW 20 MIN: CPT | Performed by: FAMILY MEDICINE

## 2022-02-09 ASSESSMENT — ASTHMA QUESTIONNAIRES: ACT_TOTALSCORE: 25

## 2022-02-09 ASSESSMENT — PAIN SCALES - GENERAL: PAINLEVEL: NO PAIN (0)

## 2022-02-09 ASSESSMENT — MIFFLIN-ST. JEOR: SCORE: 1670.66

## 2022-02-09 NOTE — PROGRESS NOTES
[address]  [facility] [address]  Phone: [phone]  Primary Provider: Rosetta Nicole  Pre-op Performing Provider: ROSETTA NICOLE      PREOPERATIVE EVALUATION:  Today's date: 2/9/2022    Edda Schmidt is a 42 year old female who presents for a preoperative evaluation.    Surgical Information:  Surgery/Procedure: CREATION, VAGINAL SLING, WITH CYSTOSCOPY  (Support the urethra with mesh and look in the bladder)  Surgery Location: [facility]  Surgeon: Dr. Sherman [facility]  Surgery Date: 2/15/22  Time of Surgery: 945am  Where patient plans to recover: At home with family  Fax number for surgical facility: Note does not need to be faxed, will be available electronically in Epic.    Type of Anesthesia Anticipated: General    Assessment & Plan     The proposed surgical procedure is considered INTERMEDIATE risk.      ICD-10-CM    1. Preop general physical exam  Z01.818    2. Mild intermittent asthma without complication  J45.20    3. Screening for HIV (human immunodeficiency virus)  Z11.4    4. Need for hepatitis C screening test  Z11.59    5. Lipid screening  Z13.220 Lipid panel reflex to direct LDL Fasting     Doing well with asthma and has not been needing inhaler.         Risks and Recommendations:  The patient has the following additional risks and recommendations for perioperative complications:   - No identified additional risk factors other than previously addressed    Medication Instructions:   - diclofenac (Voltaren): HOLD 1 day before surgery.     RECOMMENDATION:  APPROVAL GIVEN to proceed with proposed procedure, without further diagnostic evaluation.        21 minutes spent on the date of the encounter doing chart review, history and exam, documentation and further activities per the note        Subjective     HPI related to upcoming procedure: bladder leakage    Preop Questions 2/9/2022   1. Have you ever had a heart attack or stroke? No    2. Have you ever had surgery on your heart or blood vessels, such as a stent placement, a coronary artery bypass, or surgery on an artery in your head, neck, heart, or legs? No   3. Do you have chest pain with activity? No   4. Do you have a history of  heart failure? No   5. Do you currently have a cold, bronchitis or symptoms of other infection? No   6. Do you have a cough, shortness of breath, or wheezing? No   7. Do you or anyone in your family have previous history of blood clots? YES - dad after ortho surgery and trauma   8. Do you or does anyone in your family have a serious bleeding problem such as prolonged bleeding following surgeries or cuts? No   9. Have you ever had problems with anemia or been told to take iron pills? No   10. Have you had any abnormal blood loss such as black, tarry or bloody stools, or abnormal vaginal bleeding? No   11. Have you ever had a blood transfusion? No   12. Are you willing to have a blood transfusion if it is medically needed before, during, or after your surgery? Yes   13. Have you or any of your relatives ever had problems with anesthesia? No   14. Do you have sleep apnea, excessive snoring or daytime drowsiness? No   15. Do you have any artifical heart valves or other implanted medical devices like a pacemaker, defibrillator, or continuous glucose monitor? No   16. Do you have artificial joints? No   17. Are you allergic to latex? No   18. Is there any chance that you may be pregnant? No       Health Care Directive:  Patient does not have a Health Care Directive or Living Will: Discussed advance care planning with patient; however, patient declined at this time.    Preoperative Review of :   reviewed - diazepam times 1 for root canal    Review of Systems  Constitutional, neuro, ENT, endocrine, pulmonary, cardiac, gastrointestinal, genitourinary, musculoskeletal, integument and psychiatric systems are negative, except as otherwise noted.    Patient Active Problem  List    Diagnosis Date Noted     Stress incontinence of urine 02/01/2022     Priority: Medium     Added automatically from request for surgery [id number]       Segmental dysfunction of cervical region 05/24/2019     Priority: Medium     Segmental dysfunction of thoracic region 05/24/2019     Priority: Medium     Segmental dysfunction of sacral region 05/24/2019     Priority: Medium     Mechanical back pain 05/24/2019     Priority: Medium     Menstrual migraine 01/31/2019     Priority: Medium     Left-sided low back pain with left-sided sciatica 01/31/2019     Priority: Medium     Car sickness 01/31/2019     Priority: Medium     Asthma 05/12/2011     Priority: Medium     Overview:   Asthma  mild intermittant        Past Medical History:   Diagnosis Date     Car sickness      Motion sickness      Past Surgical History:   Procedure Laterality Date     INJECT EPIDURAL LUMBAR Left 5/10/2019    Procedure: left sided Lumbar 4-5 translaminar injection;  Surgeon: Jordy Tanner MD;  Location:  OR     Current Outpatient Medications   Medication Sig Dispense Refill     clobetasol (TEMOVATE) 0.05 % external cream Apply topically 2 times daily 60 g 4     clobetasol (TEMOVATE) 0.05 % external ointment Apply topically At Bedtime 60 g 0     methocarbamol (ROBAXIN) 750 MG tablet Take 1 tablet (750 mg) by mouth 4 times daily as needed for muscle spasms 30 tablet 0     Misc Natural Products (COSAMIN ASU FOR JOINT HEALTH PO)        ondansetron (ZOFRAN) 8 MG tablet Take 1 tablet (8 mg) by mouth every 8 hours as needed for nausea 30 tablet 1     diclofenac (VOLTAREN) 75 MG EC tablet Take 1 tablet (75 mg) by mouth 2 times daily (Patient not taking: Reported on 11/24/2021) 40 tablet 1       No Known Allergies     Social History     Tobacco Use     Smoking status: Never Smoker     Smokeless tobacco: Never Used   Substance Use Topics     Alcohol use: Yes       History   Drug Use No         Objective     /80   Pulse 58   Temp 97.2  F  "(36.2  C) (Temporal)   Resp 20   Ht 1.67 m (5' 5.75\")   Wt 99.8 kg (220 lb)   LMP 02/06/2022   SpO2 99%   Breastfeeding No   BMI 35.78 kg/m      Physical Exam    GENERAL APPEARANCE: healthy, alert and no distress     EYES: EOMI, PERRL     HENT: ear canals and TM's normal and nose and mouth without ulcers or lesions     NECK: mild anterior adenopathy, no asymmetry, masses, or scars and thyroid normal to palpation     RESP: lungs clear to auscultation - no rales, rhonchi or wheezes     CV: regular rates and rhythm, normal S1 S2, no S3 or S4 and no murmur, click or rub     ABDOMEN:  soft, nontender, no HSM or masses and bowel sounds normal     MS: extremities normal- no gross deformities noted, no evidence of inflammation in joints, FROM in all extremities.     SKIN: no suspicious lesions or rashes     NEURO: Normal strength and tone, sensory exam grossly normal, mentation intact and speech normal     PSYCH: mentation appears normal. and affect normal/bright     LYMPHATICS: No cervical adenopathy    No results for input(s): HGB, PLT, INR, NA, POTASSIUM, CR, A1C in the last [id number] hours.     Diagnostics:  No labs were ordered during this visit.   No EKG required, no history of coronary heart disease, significant arrhythmia, peripheral arterial disease or other structural heart disease.    Revised Cardiac Risk Index (RCRI):  The patient has the following serious cardiovascular risks for perioperative complications:   - No serious cardiac risks = 0 points     RCRI Interpretation: 0 points: Class I (very low risk - 0.4% complication rate)           Signed Electronically by: Rosetta Nicole MD, MD  Copy of this evaluation report is provided to requesting physician.      "

## 2022-02-11 ENCOUNTER — LAB (OUTPATIENT)
Dept: LAB | Facility: CLINIC | Age: 42
End: 2022-02-11
Payer: COMMERCIAL

## 2022-02-11 DIAGNOSIS — Z11.59 ENCOUNTER FOR SCREENING FOR OTHER VIRAL DISEASES: ICD-10-CM

## 2022-02-11 PROCEDURE — U0005 INFEC AGEN DETEC AMPLI PROBE: HCPCS

## 2022-02-11 PROCEDURE — U0003 INFECTIOUS AGENT DETECTION BY NUCLEIC ACID (DNA OR RNA); SEVERE ACUTE RESPIRATORY SYNDROME CORONAVIRUS 2 (SARS-COV-2) (CORONAVIRUS DISEASE [COVID-19]), AMPLIFIED PROBE TECHNIQUE, MAKING USE OF HIGH THROUGHPUT TECHNOLOGIES AS DESCRIBED BY CMS-2020-01-R: HCPCS

## 2022-02-12 LAB — SARS-COV-2 RNA RESP QL NAA+PROBE: NEGATIVE

## 2022-02-14 ENCOUNTER — ANESTHESIA EVENT (OUTPATIENT)
Dept: SURGERY | Facility: AMBULATORY SURGERY CENTER | Age: 42
End: 2022-02-14
Payer: COMMERCIAL

## 2022-02-15 ENCOUNTER — HOSPITAL ENCOUNTER (OUTPATIENT)
Facility: AMBULATORY SURGERY CENTER | Age: 42
End: 2022-02-15
Attending: UROLOGY
Payer: COMMERCIAL

## 2022-02-15 ENCOUNTER — ANESTHESIA (OUTPATIENT)
Dept: SURGERY | Facility: AMBULATORY SURGERY CENTER | Age: 42
End: 2022-02-15
Payer: COMMERCIAL

## 2022-02-15 VITALS
DIASTOLIC BLOOD PRESSURE: 75 MMHG | HEIGHT: 65 IN | BODY MASS INDEX: 36.65 KG/M2 | OXYGEN SATURATION: 97 % | HEART RATE: 57 BPM | SYSTOLIC BLOOD PRESSURE: 132 MMHG | RESPIRATION RATE: 16 BRPM | TEMPERATURE: 96.7 F | WEIGHT: 220 LBS

## 2022-02-15 DIAGNOSIS — N39.3 STRESS INCONTINENCE OF URINE: Primary | ICD-10-CM

## 2022-02-15 PROCEDURE — C1771 REP DEV, URINARY, W/SLING: HCPCS

## 2022-02-15 PROCEDURE — 57288 REPAIR BLADDER DEFECT: CPT | Mod: GC | Performed by: UROLOGY

## 2022-02-15 PROCEDURE — 57288 REPAIR BLADDER DEFECT: CPT

## 2022-02-15 DEVICE — SLING SOLYX TRANSVAGINAL MESH M0068507000: Type: IMPLANTABLE DEVICE | Site: VAGINA | Status: FUNCTIONAL

## 2022-02-15 RX ORDER — ONDANSETRON 2 MG/ML
INJECTION INTRAMUSCULAR; INTRAVENOUS PRN
Status: DISCONTINUED | OUTPATIENT
Start: 2022-02-15 | End: 2022-02-15

## 2022-02-15 RX ORDER — DIAZEPAM 10 MG/2ML
2.5 INJECTION, SOLUTION INTRAMUSCULAR; INTRAVENOUS
Status: DISCONTINUED | OUTPATIENT
Start: 2022-02-15 | End: 2022-02-15 | Stop reason: HOSPADM

## 2022-02-15 RX ORDER — SENNA AND DOCUSATE SODIUM 50; 8.6 MG/1; MG/1
1 TABLET, FILM COATED ORAL AT BEDTIME
Qty: 30 TABLET | Refills: 0 | Status: SHIPPED | OUTPATIENT
Start: 2022-02-15 | End: 2022-05-11

## 2022-02-15 RX ORDER — FENTANYL CITRATE 50 UG/ML
25 INJECTION, SOLUTION INTRAMUSCULAR; INTRAVENOUS EVERY 5 MIN PRN
Status: DISCONTINUED | OUTPATIENT
Start: 2022-02-15 | End: 2022-02-15 | Stop reason: HOSPADM

## 2022-02-15 RX ORDER — LIDOCAINE 40 MG/G
CREAM TOPICAL
Status: DISCONTINUED | OUTPATIENT
Start: 2022-02-15 | End: 2022-02-15 | Stop reason: HOSPADM

## 2022-02-15 RX ORDER — FENTANYL CITRATE 50 UG/ML
25 INJECTION, SOLUTION INTRAMUSCULAR; INTRAVENOUS
Status: DISCONTINUED | OUTPATIENT
Start: 2022-02-15 | End: 2022-02-16 | Stop reason: HOSPADM

## 2022-02-15 RX ORDER — ACETAMINOPHEN 325 MG/1
975 TABLET ORAL ONCE
Status: COMPLETED | OUTPATIENT
Start: 2022-02-15 | End: 2022-02-15

## 2022-02-15 RX ORDER — BUPIVACAINE HYDROCHLORIDE AND EPINEPHRINE 2.5; 5 MG/ML; UG/ML
INJECTION, SOLUTION INFILTRATION; PERINEURAL PRN
Status: DISCONTINUED | OUTPATIENT
Start: 2022-02-15 | End: 2022-02-15 | Stop reason: HOSPADM

## 2022-02-15 RX ORDER — FENTANYL CITRATE 50 UG/ML
INJECTION, SOLUTION INTRAMUSCULAR; INTRAVENOUS PRN
Status: DISCONTINUED | OUTPATIENT
Start: 2022-02-15 | End: 2022-02-15

## 2022-02-15 RX ORDER — MEPERIDINE HYDROCHLORIDE 25 MG/ML
12.5 INJECTION INTRAMUSCULAR; INTRAVENOUS; SUBCUTANEOUS
Status: DISCONTINUED | OUTPATIENT
Start: 2022-02-15 | End: 2022-02-16 | Stop reason: HOSPADM

## 2022-02-15 RX ORDER — DEXAMETHASONE SODIUM PHOSPHATE 10 MG/ML
4 INJECTION, SOLUTION INTRAMUSCULAR; INTRAVENOUS EVERY 10 MIN PRN
Status: DISCONTINUED | OUTPATIENT
Start: 2022-02-15 | End: 2022-02-16 | Stop reason: HOSPADM

## 2022-02-15 RX ORDER — CEFAZOLIN SODIUM 2 G/50ML
2 SOLUTION INTRAVENOUS SEE ADMIN INSTRUCTIONS
Status: DISCONTINUED | OUTPATIENT
Start: 2022-02-15 | End: 2022-02-15 | Stop reason: HOSPADM

## 2022-02-15 RX ORDER — HYDROMORPHONE HYDROCHLORIDE 1 MG/ML
0.2 INJECTION, SOLUTION INTRAMUSCULAR; INTRAVENOUS; SUBCUTANEOUS EVERY 5 MIN PRN
Status: DISCONTINUED | OUTPATIENT
Start: 2022-02-15 | End: 2022-02-15 | Stop reason: HOSPADM

## 2022-02-15 RX ORDER — PROPOFOL 10 MG/ML
INJECTION, EMULSION INTRAVENOUS CONTINUOUS PRN
Status: DISCONTINUED | OUTPATIENT
Start: 2022-02-15 | End: 2022-02-15

## 2022-02-15 RX ORDER — SODIUM CHLORIDE, SODIUM LACTATE, POTASSIUM CHLORIDE, CALCIUM CHLORIDE 600; 310; 30; 20 MG/100ML; MG/100ML; MG/100ML; MG/100ML
INJECTION, SOLUTION INTRAVENOUS CONTINUOUS
Status: DISCONTINUED | OUTPATIENT
Start: 2022-02-15 | End: 2022-02-15 | Stop reason: HOSPADM

## 2022-02-15 RX ORDER — ALBUTEROL SULFATE 0.83 MG/ML
2.5 SOLUTION RESPIRATORY (INHALATION) EVERY 4 HOURS PRN
Status: DISCONTINUED | OUTPATIENT
Start: 2022-02-15 | End: 2022-02-15 | Stop reason: HOSPADM

## 2022-02-15 RX ORDER — SODIUM CHLORIDE, SODIUM LACTATE, POTASSIUM CHLORIDE, CALCIUM CHLORIDE 600; 310; 30; 20 MG/100ML; MG/100ML; MG/100ML; MG/100ML
INJECTION, SOLUTION INTRAVENOUS CONTINUOUS
Status: DISCONTINUED | OUTPATIENT
Start: 2022-02-15 | End: 2022-02-16 | Stop reason: HOSPADM

## 2022-02-15 RX ORDER — OXYCODONE HYDROCHLORIDE 5 MG/1
5 TABLET ORAL EVERY 6 HOURS PRN
Qty: 12 TABLET | Refills: 0 | Status: SHIPPED | OUTPATIENT
Start: 2022-02-15 | End: 2022-02-18

## 2022-02-15 RX ORDER — ONDANSETRON 4 MG/1
4 TABLET, ORALLY DISINTEGRATING ORAL EVERY 30 MIN PRN
Status: DISCONTINUED | OUTPATIENT
Start: 2022-02-15 | End: 2022-02-16 | Stop reason: HOSPADM

## 2022-02-15 RX ORDER — ONDANSETRON 2 MG/ML
4 INJECTION INTRAMUSCULAR; INTRAVENOUS EVERY 30 MIN PRN
Status: DISCONTINUED | OUTPATIENT
Start: 2022-02-15 | End: 2022-02-16 | Stop reason: HOSPADM

## 2022-02-15 RX ORDER — PROPOFOL 10 MG/ML
INJECTION, EMULSION INTRAVENOUS PRN
Status: DISCONTINUED | OUTPATIENT
Start: 2022-02-15 | End: 2022-02-15

## 2022-02-15 RX ORDER — CEFAZOLIN SODIUM 2 G/50ML
2 SOLUTION INTRAVENOUS
Status: COMPLETED | OUTPATIENT
Start: 2022-02-15 | End: 2022-02-15

## 2022-02-15 RX ORDER — KETAMINE HYDROCHLORIDE 10 MG/ML
INJECTION INTRAMUSCULAR; INTRAVENOUS PRN
Status: DISCONTINUED | OUTPATIENT
Start: 2022-02-15 | End: 2022-02-15

## 2022-02-15 RX ORDER — LABETALOL HYDROCHLORIDE 5 MG/ML
10 INJECTION, SOLUTION INTRAVENOUS
Status: DISCONTINUED | OUTPATIENT
Start: 2022-02-15 | End: 2022-02-15 | Stop reason: HOSPADM

## 2022-02-15 RX ORDER — OXYCODONE HYDROCHLORIDE 5 MG/1
5 TABLET ORAL EVERY 4 HOURS PRN
Status: DISCONTINUED | OUTPATIENT
Start: 2022-02-15 | End: 2022-02-16 | Stop reason: HOSPADM

## 2022-02-15 RX ORDER — GLYCOPYRROLATE 0.2 MG/ML
INJECTION, SOLUTION INTRAMUSCULAR; INTRAVENOUS PRN
Status: DISCONTINUED | OUTPATIENT
Start: 2022-02-15 | End: 2022-02-15

## 2022-02-15 RX ORDER — SCOLOPAMINE TRANSDERMAL SYSTEM 1 MG/1
1 PATCH, EXTENDED RELEASE TRANSDERMAL
Status: DISCONTINUED | OUTPATIENT
Start: 2022-02-15 | End: 2022-02-16 | Stop reason: HOSPADM

## 2022-02-15 RX ADMIN — GLYCOPYRROLATE 0.2 MG: 0.2 INJECTION, SOLUTION INTRAMUSCULAR; INTRAVENOUS at 09:50

## 2022-02-15 RX ADMIN — ACETAMINOPHEN 975 MG: 325 TABLET ORAL at 08:28

## 2022-02-15 RX ADMIN — OXYCODONE HYDROCHLORIDE 5 MG: 5 TABLET ORAL at 10:27

## 2022-02-15 RX ADMIN — FENTANYL CITRATE 50 MCG: 50 INJECTION, SOLUTION INTRAMUSCULAR; INTRAVENOUS at 09:38

## 2022-02-15 RX ADMIN — ONDANSETRON 4 MG: 2 INJECTION INTRAMUSCULAR; INTRAVENOUS at 09:30

## 2022-02-15 RX ADMIN — PROPOFOL 50 MG: 10 INJECTION, EMULSION INTRAVENOUS at 09:35

## 2022-02-15 RX ADMIN — CEFAZOLIN SODIUM 2 G: 2 SOLUTION INTRAVENOUS at 09:27

## 2022-02-15 RX ADMIN — KETAMINE HYDROCHLORIDE 20 MG: 10 INJECTION INTRAMUSCULAR; INTRAVENOUS at 09:50

## 2022-02-15 RX ADMIN — SCOLOPAMINE TRANSDERMAL SYSTEM 1 PATCH: 1 PATCH, EXTENDED RELEASE TRANSDERMAL at 08:59

## 2022-02-15 RX ADMIN — SODIUM CHLORIDE, SODIUM LACTATE, POTASSIUM CHLORIDE, CALCIUM CHLORIDE: 600; 310; 30; 20 INJECTION, SOLUTION INTRAVENOUS at 08:48

## 2022-02-15 RX ADMIN — PROPOFOL 150 MCG/KG/MIN: 10 INJECTION, EMULSION INTRAVENOUS at 09:33

## 2022-02-15 RX ADMIN — FENTANYL CITRATE 25 MCG: 50 INJECTION, SOLUTION INTRAMUSCULAR; INTRAVENOUS at 09:48

## 2022-02-15 ASSESSMENT — MIFFLIN-ST. JEOR: SCORE: 1658.79

## 2022-02-15 NOTE — ANESTHESIA POSTPROCEDURE EVALUATION
Patient: Edda Schmidt    Procedure: Procedure(s):  CREATION, VAGINAL SLING, WITH CYSTOSCOPY  (Support the urethra with mesh and look in the bladder)       Diagnosis:Stress incontinence of urine [N39.3]  Diagnosis Additional Information: No value filed.    Anesthesia Type:  MAC    Note:  Disposition: Outpatient   Postop Pain Control: Uneventful            Sign Out: Well controlled pain   PONV: No   Neuro/Psych: Uneventful            Sign Out: Acceptable/Baseline neuro status   Airway/Respiratory: Uneventful            Sign Out: Acceptable/Baseline resp. status   CV/Hemodynamics: Uneventful            Sign Out: Acceptable CV status   Other NRE: NONE   DID A NON-ROUTINE EVENT OCCUR? No           Last vitals:  Vitals Value Taken Time   /75 02/15/22 1031   Temp 35.9  C (96.7  F) 02/15/22 1014   Pulse 57 02/15/22 1031   Resp 16 02/15/22 1031   SpO2 97 % 02/15/22 1014       Electronically Signed By: Vazquez Morales MD  February 15, 2022  1:23 PM

## 2022-02-15 NOTE — OR NURSING
Patient refused HCG test for surgery.  Patient states she had her period last week and has been in a monogamous relationship for 6 years so there is no way she can be pregnant.  Dr. Morales of Anesthesia notified of patient declining HCG and risks of anesthesia if she were to be pregnant.

## 2022-02-15 NOTE — ANESTHESIA PREPROCEDURE EVALUATION
Anesthesia Pre-Procedure Evaluation    Patient: Edda Schmidt   MRN: [id number] : 1980        Preoperative Diagnosis: Stress incontinence of urine [N39.3]    Procedure : Procedure(s):  CREATION, VAGINAL SLING, WITH CYSTOSCOPY  (Support the urethra with mesh and look in the bladder)          Past Medical History:   Diagnosis Date     Car sickness      Motion sickness       Past Surgical History:   Procedure Laterality Date     INJECT EPIDURAL LUMBAR Left 5/10/2019    Procedure: left sided Lumbar 4-5 translaminar injection;  Surgeon: Jordy Tanner MD;  Location: [facility]      No Known Allergies   Social History     Tobacco Use     Smoking status: Never Smoker     Smokeless tobacco: Never Used   Substance Use Topics     Alcohol use: Yes      Wt Readings from Last 1 Encounters:   02/15/22 99.8 kg (220 lb)        Anesthesia Evaluation   Pt has had prior anesthetic.     History of anesthetic complications  - PONV.      ROS/MED HX  ENT/Pulmonary:     (+) asthma     Neurologic:       Cardiovascular:       METS/Exercise Tolerance:     Hematologic:       Musculoskeletal:       GI/Hepatic:       Renal/Genitourinary:       Endo:       Psychiatric/Substance Use:       Infectious Disease:       Malignancy:       Other:            Physical Exam    Airway  airway exam normal      Mallampati: II   TM distance: > 3 FB   Neck ROM: full   Mouth opening: > 3 cm    Respiratory Devices and Support         Dental  no notable dental history         Cardiovascular          Rhythm and rate: regular and normal     Pulmonary   pulmonary exam normal        breath sounds clear to auscultation           OUTSIDE LABS:  CBC: No results found for: WBC, HGB, HCT, PLT  BMP: No results found for: NA, POTASSIUM, CHLORIDE, CO2, BUN, CR, GLC  COAGS: No results found for: PTT, INR, FIBR  POC:   Lab Results   Component Value Date    HCG Negative 05/10/2019     HEPATIC: No results found for: ALBUMIN, PROTTOTAL, ALT, AST, GGT, ALKPHOS, BILITOTAL,  BILIDIRECT, MARY KAY  OTHER: No results found for: PH, LACT, A1C, RAINA, PHOS, MAG, LIPASE, AMYLASE, TSH, T4, T3, CRP, SED    Anesthesia Plan    ASA Status:  1   NPO Status:  NPO Appropriate    Anesthesia Type: MAC.     - Reason for MAC: immobility needed, straight local not clinically adequate   Induction: Intravenous.   Maintenance: TIVA.        Consents    Anesthesia Plan(s) and associated risks, benefits, and realistic alternatives discussed. Questions answered and patient/representative(s) expressed understanding.    - Discussed:     - Discussed with:  Patient      - Extended Intubation/Ventilatory Support Discussed: No.      - Patient is DNR/DNI Status: No    Use of blood products discussed: No .     Postoperative Care    Pain management: IV analgesics, Oral pain medications, Multi-modal analgesia.   PONV prophylaxis: Ondansetron (or other 5HT-3), Background Propofol Infusion     Comments:                Vazquez Morales MD

## 2022-02-15 NOTE — BRIEF OP NOTE
[facility] [place]    Brief Operative Note    Pre-operative diagnosis: Stress incontinence of urine [N39.3]  Post-operative diagnosis Same as pre-operative diagnosis    Procedure: Procedure(s):  CREATION, VAGINAL SLING, WITH CYSTOSCOPY  (Support the urethra with mesh and look in the bladder)  Surgeon: Surgeon(s) and Role:     * Jeremy Sherman MD - Primary  Anesthesia: Monitor Anesthesia Care   Estimated Blood Loss: Minimal    Drains: None  Specimens: * No specimens in log *  Findings:   No mesh erosions visualzied on cystsocopy, bladder and urethra w/o trauma, minimal stress incontinence following sling placment. .  Complications: None.  Implants:   Implant Name Type Inv. Item Serial No.  Lot No. LRB No. Used Action   SLING SOLYX TRANSVAGINAL MESH [id number] - [id number] Mesh SLING SOLYX TRANSVAGINAL MESH [id number]  [employer] [id number] N/A 1 Implanted

## 2022-02-15 NOTE — ANESTHESIA CARE TRANSFER NOTE
Patient: Edda Schmidt    Procedure: Procedure(s):  CREATION, VAGINAL SLING, WITH CYSTOSCOPY  (Support the urethra with mesh and look in the bladder)       Diagnosis: Stress incontinence of urine [N39.3]  Diagnosis Additional Information: No value filed.    Anesthesia Type:   MAC     Note:    Oropharynx: oropharynx clear of all foreign objects and spontaneously breathing  Level of Consciousness: awake  Oxygen Supplementation: room air    Independent Airway: airway patency satisfactory and stable  Dentition: dentition unchanged  Vital Signs Stable: post-procedure vital signs reviewed and stable  Report to RN Given: handoff report given  Patient transferred to: Phase II  Comments: Uneventful transport to Phase 2; IV patent; Pt responds appropriately to command; Pt comfortable; VSS: Report to RN Brook Centeno  Handoff Report: Identifed the Patient, Identified the Reponsible Provider, Reviewed the pertinent medical history, Discussed the surgical course, Reviewed Intra-OP anesthesia mangement and issues during anesthesia, Set expectations for post-procedure period and Allowed opportunity for questions and acknowledgement of understanding      Vitals:  Vitals Value Taken Time   /77 02/15/22 1014   Temp 35.9  C (96.7  F) 02/15/22 1014   Pulse 69 02/15/22 1014   Resp 14 02/15/22 1014   SpO2 97 % 02/15/22 1014       Electronically Signed By: SOURAV SHARMA CRNA  February 15, 2022  10:16 AM

## 2022-02-15 NOTE — DISCHARGE INSTRUCTIONS
[facility]  Home Care Following Anesthesia  For 24 hours after surgery:  1. Get plenty of rest.  A responsible adult must stay with you for at least 24 hours after you leave the surgery center.  2. Do not drive or use heavy equipment.  If you have weakness or tingling, don't drive or use heavy equipment until this feeling goes away.   3. Do not drink alcohol.   4. Avoid strenuous or risky activities.  Ask for help when climbing stairs.  5. You may feel lightheaded.  IF so, sit for a few minutes before standing.  Have someone help you get up.   6. If you have nausea (feel sick to your stomach): Drink only clear liquids such as apple juice, ginger ale, broth or 7-Up.  Rest may also help.  Be sure to drink enough fluids.  Move to a regular diet as you feel able.   7. You may have a slight fever.  Call the doctor if your fever is over 100 F (37.7 C) (taken under the tongue) or lasts longer than 24 hours.  8. You may have a dry mouth, a sore throat, muscle aches or trouble sleeping. These should go away after 24 hours.  9. Do not make important or legal decisions.   10. It is recommended to avoid smoking.               Tips for taking pain medications  To get the best pain relief possible, remember these points:    Take pain medications as directed, before pain becomes severe.    Pain medication can upset your stomach: taking it with food may help.    Constipation is a common side effect of pain medication. Drink plenty of  fluids.    Eat foods high in fiber. Take a stool softener if recommended by your doctor or pharmacist.    Do not drink alcohol, drive or operate machinery while taking pain medications.    Ask about other ways to control pain, such as with heat, ice or relaxation.    Tylenol/Acetaminophen Consumption  To help encourage the safe use of acetaminophen, the makers of TYLENOL  have lowered the maximum daily dose for single-ingredient Extra Strength TYLENOL   (acetaminophen) products sold in the [place] from 8 pills per day (4,000 mg) to 6 pills per day (3,000 mg). The dosing interval has also changed from 2 pills every 4-6 hours to 2 pills every 6 hours.    If you feel your pain relief is insufficient, you may take Tylenol/Acetaminophen in addition to your narcotic pain medication.     Be careful not to exceed 3,000 mg of Tylenol/Acetaminophen in a 24 hour period from all sources.    If you are taking extra strength Tylenol/acetaminophen (500 mg), the maximum dose is 6 tablets in 24 hours.    If you are taking regular strength acetaminophen (325 mg), the maximum dose is 9 tablets in 24 hours.    Call a doctor for any of the followin. Signs of infection (fever, growing tenderness at the surgery site, a large amount of drainage or bleeding, severe pain, foul-smelling drainage, redness, swelling).  2. It has been over 8 to 10 hours since surgery and you are still not able to urinate (pass water).  3. Headache for over 24 hours.  4. Numbness, tingling or weakness the day after surgery (if you had spinal anesthesia).  5. Signs of Covid-19 infection (temperature over 100 degrees, shortness of breath, cough, loss of taste/smell, generalized body aches, persistent headache, chills, sore throat, nausea/vomiting/diarrhea)  Your doctor is:       Dr. Jeremy Sherman, Prostate and Urology: [phone]               Or dial [phone] and ask for the resident on call for:  Prostate Urology  For emergency care, call the:  [facility]:  [phone] (TTY for hearing impaired: [phone])

## 2022-02-15 NOTE — OP NOTE
PREOPERATIVE DIAGNOSES: 1. Stress urinary incontinence. 2. Urethral hypermobility.  POSTOPERATIVE DIAGNOSES: Same  PROCEDURE PERFORMED: 1. Mid urethral sling ([employer] Solyx). 2. Cystoscopy.     ATTENDING PHYSICIAN: Jeremy Sherman MD   RESIDENT SURGEON: Melchor Mandujano MD  ANESTHESIA: Monitored anesthesia care with 5 mL of 1% lidocaine with epinephrine and 10 mL Xylocaine jelly per urethra.   ESTIMATED BLOOD LOSS: 10 mL.   FINDINGS:  No mesh in the bladder  SPECIMENS:  None    HISTORY OF PRESENT ILLNESS: Edda Schmidt is a 42 year old year-old woman with a history of stress urinary incontinence. Differing management options were discussed with the patient and she has elected to proceed with a mid urethral sling.    DESCRIPTION OF PROCEDURE: After informed consent was obtained, the patient was identified, marked and taken to the operating room in her usual state of health. After induction with monitored anesthesia care, she was positioned in modified lithotomy position. Pneumo boots were placed. A timeout was performed to ensure proper patient, procedure and positioning. The patient received 1 gram of Ancef prior to initiation of surgery. She was prepped with Betadine. Xylocaine jelly of 10 mL were inserted into the urethra and a 18-Romansh Diana catheter was placed.     An Allis clamp was used to grasp the vaginal mucosa approximately 1 cm from the urethral meatus at the mid urethra. A 25-gauge needle, we injected 10 mL of 1% lidocaine with epinephrine to hydrodissect the tissues. We then made a 1-cm incision through the vaginal mucosa with a #15 blade. We then dissected this laterally out passed the fornices of the vagina in the pubocervical fascia, out laterally to the pelvic side wall, first on the right-hand side and then on the left hand side. We then placed the Solyx sling on the trocar and placed this first through the obturator membrane on the left side and then deployed it and then placed it on  the right hand side. The sling lay flat.  The sling was placed under appropriate tension.  We then placed a 20-Eritrean cystoscope through the urethra and into the bladder, noting bilateral ureteral orifices. The floor of the bladder was intact as well as the bladder neck and the lateral edges, no mesh was seen. The urethra was without tape. We then withdrew the cystoscope with a full bladder and pressed on the suprapubic region and noted minimal incontinence from the urethral meatus. At this time the sling on the right side was deployed and  the wound was irrigated.  A running 2-0 Vicryl was then used to reapproximate the mucosa of the vagina. The patient was then returned to the supine position and transported to recovery in stable condition.     ASSESSMENT AND PLAN: Edda Schmidt is a 42 year old year-old woman who underwent a midurethral sling for stress urinary incontinence. We will plan to follow up with her in 2 weeks in Urology Clinic. Obtain residual urine and evaluate for obstructive urinary symptoms.     Melchor Mandujano MD    Patient was seen, evaluated and plan was formulated in conjunction with me and I agree with the above.  I was present for the entire procedure.  Jeremy Sherman MD

## 2022-02-22 ENCOUNTER — MYC MEDICAL ADVICE (OUTPATIENT)
Dept: UROLOGY | Facility: CLINIC | Age: 42
End: 2022-02-22
Payer: COMMERCIAL

## 2022-03-07 ENCOUNTER — MYC MEDICAL ADVICE (OUTPATIENT)
Dept: UROLOGY | Facility: CLINIC | Age: 42
End: 2022-03-07

## 2022-03-07 ENCOUNTER — VIRTUAL VISIT (OUTPATIENT)
Dept: UROLOGY | Facility: CLINIC | Age: 42
End: 2022-03-07
Payer: COMMERCIAL

## 2022-03-07 DIAGNOSIS — N39.3 STRESS INCONTINENCE: Primary | ICD-10-CM

## 2022-03-07 PROCEDURE — 99024 POSTOP FOLLOW-UP VISIT: CPT | Performed by: UROLOGY

## 2022-03-07 NOTE — PROGRESS NOTES
Reason for Visit: 3 week post op for A midurethral sling on 2/15/22.    Clinical Data: Ms. Edda Schmidt  is a 42 year old year old   female with a history of the above.   She returns today for her post operative visit.  She states that she is doing well.  No stress incontinence, no urinary urgency no urge incontinence.  No difficulty urinating.    A/P s/p midurethral sling doing well  -f/u in 4 weeks.  -continue restrictions of lifting and sexual activity      Thank you for allowing me to participate in the care of  Ms.Rebecca GIACOMO Schmidt   and I will keep you updated on her progress.    Jeremy Sherman MD

## 2022-03-07 NOTE — CONFIDENTIAL NOTE
Jeremy Sherman MD  You 24 minutes ago (3:52 PM)     NN    No, she has to continue with all restrictions for 3 more weeks.    Message text      Received the above message from Dr. Sherman. My chart message sent to patient.    Flakita Kim RN, BSN

## 2022-03-07 NOTE — PROGRESS NOTES
Edda Schmidt  who is being evaluated via a billable video visit.      How would you like to obtain your AVS? MyChart  If the video visit is dropped, the invitation should be resent by: Text to cell phone: [phone]  Will anyone else be joining your video visit? No    Video-Visit Details    Type of service:  Video Visit    Video Start Time: 9:53 AM    Video End Time:9:58 AM    Originating Location (pt. Location): Home    Distant Location (provider location):  [facility]     Platform used for Video Visit: [employer]

## 2022-04-04 ENCOUNTER — OFFICE VISIT (OUTPATIENT)
Dept: UROLOGY | Facility: CLINIC | Age: 42
End: 2022-04-04
Payer: COMMERCIAL

## 2022-04-04 DIAGNOSIS — N39.3 STRESS INCONTINENCE: Primary | ICD-10-CM

## 2022-04-04 PROCEDURE — 99024 POSTOP FOLLOW-UP VISIT: CPT | Performed by: UROLOGY

## 2022-04-04 NOTE — PROGRESS NOTES
Reason for Visit: s/p midurethral sling on 2/15/22.    Clinical Data: Ms. Edda Schmidt  is a 42 year old year old   female with a history of the above.   She returns today for her post operative visit.  She states that she is doing well.  No stress incontinence, no urinary urgency no urge incontinence.  No difficulty urinating.  She is very pleased.    On exam:  No leakage  Incision well healed    A/P s/p midurethral sling doing well  -f/u prn  -discontinue restrictions of lifting and sexual activity  -discussed considering to use at least vaginal estrogen after menopause to help maintain the health of the tissue surrounding the sling.      Thank you for allowing me to participate in the care of  Ms.Rebecca GIACOMO Schmidt   and I will keep you updated on her progress.    Jeremy Sherman MD

## 2022-04-04 NOTE — NURSING NOTE
Edda Schmidt's goals for this visit include:   Chief Complaint   Patient presents with     Follow Up     6 week post op       She requests these members of her care team be copied on today's visit information:     PCP: Rosetta Nicole    Referring Provider:  No referring provider defined for this encounter.    There were no vitals taken for this visit.    Do you need any medication refills at today's visit?     post void residual = 86 ml    Iraida Landrum LPN on 4/4/2022 at 11:33 AM

## 2022-05-08 ASSESSMENT — ENCOUNTER SYMPTOMS
ARTHRALGIAS: 0
NAUSEA: 0
SORE THROAT: 0
HEMATOCHEZIA: 0
PALPITATIONS: 0
HEMATURIA: 0
DYSURIA: 0
CHILLS: 0
BREAST MASS: 0
SHORTNESS OF BREATH: 0
DIARRHEA: 0
FEVER: 0
ABDOMINAL PAIN: 0
JOINT SWELLING: 0
MYALGIAS: 0
EYE PAIN: 0
HEARTBURN: 0
FREQUENCY: 0
CONSTIPATION: 0
COUGH: 0
WEAKNESS: 0
HEADACHES: 1
NERVOUS/ANXIOUS: 0
DIZZINESS: 0
PARESTHESIAS: 0

## 2022-05-11 ENCOUNTER — OFFICE VISIT (OUTPATIENT)
Dept: FAMILY MEDICINE | Facility: OTHER | Age: 42
End: 2022-05-11
Payer: COMMERCIAL

## 2022-05-11 VITALS
WEIGHT: 218 LBS | SYSTOLIC BLOOD PRESSURE: 110 MMHG | OXYGEN SATURATION: 99 % | HEIGHT: 65 IN | DIASTOLIC BLOOD PRESSURE: 76 MMHG | BODY MASS INDEX: 36.32 KG/M2 | TEMPERATURE: 97.7 F | RESPIRATION RATE: 16 BRPM | HEART RATE: 56 BPM

## 2022-05-11 DIAGNOSIS — Z11.4 SCREENING FOR HIV (HUMAN IMMUNODEFICIENCY VIRUS): ICD-10-CM

## 2022-05-11 DIAGNOSIS — M54.42 LEFT-SIDED LOW BACK PAIN WITH LEFT-SIDED SCIATICA, UNSPECIFIED CHRONICITY: ICD-10-CM

## 2022-05-11 DIAGNOSIS — J45.20 MILD INTERMITTENT ASTHMA WITHOUT COMPLICATION: ICD-10-CM

## 2022-05-11 DIAGNOSIS — Z11.59 NEED FOR HEPATITIS C SCREENING TEST: ICD-10-CM

## 2022-05-11 DIAGNOSIS — I83.813 VARICOSE VEINS OF BOTH LOWER EXTREMITIES WITH PAIN: ICD-10-CM

## 2022-05-11 DIAGNOSIS — Z00.00 ROUTINE GENERAL MEDICAL EXAMINATION AT A HEALTH CARE FACILITY: Primary | ICD-10-CM

## 2022-05-11 DIAGNOSIS — T75.3XXD CAR SICKNESS, SUBSEQUENT ENCOUNTER: ICD-10-CM

## 2022-05-11 PROCEDURE — 99214 OFFICE O/P EST MOD 30 MIN: CPT | Mod: 25 | Performed by: FAMILY MEDICINE

## 2022-05-11 PROCEDURE — 99396 PREV VISIT EST AGE 40-64: CPT | Performed by: FAMILY MEDICINE

## 2022-05-11 RX ORDER — ONDANSETRON 8 MG/1
8 TABLET, FILM COATED ORAL EVERY 8 HOURS PRN
Qty: 30 TABLET | Refills: 1 | Status: SHIPPED | OUTPATIENT
Start: 2022-05-11 | End: 2022-06-07

## 2022-05-11 RX ORDER — METHOCARBAMOL 750 MG/1
750 TABLET, FILM COATED ORAL 4 TIMES DAILY PRN
Qty: 30 TABLET | Refills: 0 | Status: SHIPPED | OUTPATIENT
Start: 2022-05-11 | End: 2022-09-07

## 2022-05-11 ASSESSMENT — ENCOUNTER SYMPTOMS
ABDOMINAL PAIN: 0
SORE THROAT: 0
ARTHRALGIAS: 0
DIZZINESS: 0
PALPITATIONS: 0
COUGH: 0
NERVOUS/ANXIOUS: 0
HEMATURIA: 0
SHORTNESS OF BREATH: 0
FEVER: 0
DIARRHEA: 0
DYSURIA: 0
JOINT SWELLING: 0
CHILLS: 0
CONSTIPATION: 0
PARESTHESIAS: 0
HEMATOCHEZIA: 0
WEAKNESS: 0
BREAST MASS: 0
NAUSEA: 0
HEARTBURN: 0
FREQUENCY: 0
MYALGIAS: 0
HEADACHES: 1
EYE PAIN: 0

## 2022-05-11 ASSESSMENT — PAIN SCALES - GENERAL: PAINLEVEL: NO PAIN (0)

## 2022-05-11 NOTE — LETTER
My Asthma Action Plan    Name: Edda Schmidt   YOB: 1980  Date: 5/11/2022   My doctor: Rosetta Nicole MD, MD   My clinic: [facility]        My Rescue Medicine:   Albuterol inhaler (Proair/Ventolin/Proventil HFA)  2-4 puffs EVERY 4 HOURS as needed. Use a spacer if recommended by your provider.   My Asthma Severity:   Intermittent / Exercise Induced  Know your asthma triggers: upper respiratory infections             GREEN ZONE   Good Control    I feel good    No cough or wheeze    Can work, sleep and play without asthma symptoms       Take your asthma control medicine every day.     1. If exercise triggers your asthma, take your rescue medication    15 minutes before exercise or sports, and    During exercise if you have asthma symptoms  2. Spacer to use with inhaler: If you have a spacer, make sure to use it with your inhaler             YELLOW ZONE Getting Worse  I have ANY of these:    I do not feel good    Cough or wheeze    Chest feels tight    Wake up at night   1. Keep taking your Green Zone medications  2. Start taking your rescue medicine:    every 20 minutes for up to 1 hour. Then every 4 hours for 24-48 hours.  3. If you stay in the Yellow Zone for more than 12-24 hours, contact your doctor.  4. If you do not return to the Green Zone in 12-24 hours or you get worse, start taking your oral steroid medicine if prescribed by your provider.           RED ZONE Medical Alert - Get Help  I have ANY of these:    I feel awful    Medicine is not helping    Breathing getting harder    Trouble walking or talking    Nose opens wide to breathe       1. Take your rescue medicine NOW  2. If your provider has prescribed an oral steroid medicine, start taking it NOW  3. Call your doctor NOW  4. If you are still in the Red Zone after 20 minutes and you have not reached your doctor:    Take your rescue medicine again and    Call 911 or go to the emergency room right away    See your  regular doctor within 2 weeks of an Emergency Room or Urgent Care visit for follow-up treatment.          Annual Reminders:  Meet with Asthma Educator,  Flu Shot in the Fall, consider Pneumonia Vaccination for patients with asthma (aged 19 and older).    Pharmacy:    [facility] [address] [facility] - [address] - [address] [phone]    Electronically signed by Rosetta Nicole MD, MD   Date: 05/11/22                    Asthma Triggers  How To Control Things That Make Your Asthma Worse    Triggers are things that make your asthma worse.  Look at the list below to help you find your triggers and   what you can do about them. You can help prevent asthma flare-ups by staying away from your triggers.      Trigger                                                          What you can do   Cigarette Smoke  Tobacco smoke can make asthma worse. Do not allow smoking in your home, car or around you.  Be sure no one smokes at a child s day care or school.  If you smoke, ask your health care provider for ways to help you quit.  Ask family members to quit too.  Ask your health care provider for a referral to Quit Plan to help you quit smoking, or call 7-203-952-PLAN.     Colds, Flu, Bronchitis  These are common triggers of asthma. Wash your hands often.  Don t touch your eyes, nose or mouth.  Get a flu shot every year.     Dust Mites  These are tiny bugs that live in cloth or carpet. They are too small to see. Wash sheets and blankets in hot water every week.   Encase pillows and mattress in dust mite proof covers.  Avoid having carpet if you can. If you have carpet, vacuum weekly.   Use a dust mask and HEPA vacuum.   Pollen and Outdoor Mold  Some people are allergic to trees, grass, or weed pollen, or molds. Try to keep your windows closed.  Limit time out doors when pollen count is  high.   Ask you health care provider about taking medicine during allergy season.     Animal Dander  Some people are allergic to skin flakes, urine or saliva from pets with fur or feathers. Keep pets with fur or feathers out of your home.    If you can t keep the pet outdoors, then keep the pet out of your bedroom.  Keep the bedroom door closed.  Keep pets off cloth furniture and away from stuffed toys.     Mice, Rats, and Cockroaches  Some people are allergic to the waste from these pests.   Cover food and garbage.  Clean up spills and food crumbs.  Store grease in the refrigerator.   Keep food out of the bedroom.   Indoor Mold  This can be a trigger if your home has high moisture. Fix leaking faucets, pipes, or other sources of water.   Clean moldy surfaces.  Dehumidify basement if it is damp and smelly.   Smoke, Strong Odors, and Sprays  These can reduce air quality. Stay away from strong odors and sprays, such as perfume, powder, hair spray, paints, smoke incense, paint, cleaning products, candles and new carpet.   Exercise or Sports  Some people with asthma have this trigger. Be active!  Ask your doctor about taking medicine before sports or exercise to prevent symptoms.    Warm up for 5-10 minutes before and after sports or exercise.     Other Triggers of Asthma  Cold air:  Cover your nose and mouth with a scarf.  Sometimes laughing or crying can be a trigger.  Some medicines and food can trigger asthma.
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
## 2022-05-14 ENCOUNTER — APPOINTMENT (OUTPATIENT)
Dept: CT IMAGING | Facility: CLINIC | Age: 42
End: 2022-05-14
Attending: PHYSICIAN ASSISTANT
Payer: COMMERCIAL

## 2022-05-14 ENCOUNTER — HOSPITAL ENCOUNTER (EMERGENCY)
Facility: CLINIC | Age: 42
Discharge: HOME | End: 2022-05-14
Attending: PHYSICIAN ASSISTANT | Admitting: PHYSICIAN ASSISTANT
Payer: COMMERCIAL

## 2022-05-14 VITALS
RESPIRATION RATE: 16 BRPM | WEIGHT: 221 LBS | SYSTOLIC BLOOD PRESSURE: 117 MMHG | DIASTOLIC BLOOD PRESSURE: 81 MMHG | BODY MASS INDEX: 36.29 KG/M2 | OXYGEN SATURATION: 100 % | TEMPERATURE: 98 F | HEART RATE: 52 BPM
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
## 2022-05-14 DIAGNOSIS — M62.830 BACK MUSCLE SPASM: ICD-10-CM

## 2022-05-14 DIAGNOSIS — S09.90XA INJURY OF HEAD, INITIAL ENCOUNTER: ICD-10-CM

## 2022-05-14 PROCEDURE — 99284 EMERGENCY DEPT VISIT MOD MDM: CPT | Performed by: PHYSICIAN ASSISTANT

## 2022-05-14 PROCEDURE — 250N000011 HC RX IP 250 OP 636: Performed by: PHYSICIAN ASSISTANT

## 2022-05-14 PROCEDURE — 99285 EMERGENCY DEPT VISIT HI MDM: CPT | Mod: 25 | Performed by: PHYSICIAN ASSISTANT

## 2022-05-14 PROCEDURE — 96372 THER/PROPH/DIAG INJ SC/IM: CPT | Performed by: PHYSICIAN ASSISTANT

## 2022-05-14 PROCEDURE — 72125 CT NECK SPINE W/O DYE: CPT

## 2022-05-14 PROCEDURE — 70450 CT HEAD/BRAIN W/O DYE: CPT

## 2022-05-14 RX ORDER — CYCLOBENZAPRINE HCL 10 MG
10 TABLET ORAL 3 TIMES DAILY PRN
Qty: 20 TABLET | Refills: 0 | Status: SHIPPED | OUTPATIENT
Start: 2022-05-14 | End: 2022-05-20

## 2022-05-14 RX ORDER — KETOROLAC TROMETHAMINE 30 MG/ML
30 INJECTION, SOLUTION INTRAMUSCULAR; INTRAVENOUS ONCE
Status: COMPLETED | OUTPATIENT
Start: 2022-05-14 | End: 2022-05-14

## 2022-05-14 RX ADMIN — KETOROLAC TROMETHAMINE 30 MG: 30 INJECTION, SOLUTION INTRAMUSCULAR at 16:12
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
## 2022-05-14 NOTE — DISCHARGE INSTRUCTIONS
Please use ibuprofen or Tylenol for pain control.  You can also take the muscle relaxer for spasm relief in the back and neck.  Apply ice to affected areas.  Try to rest over the next few days and slowly advance activities as tolerated.  If you have any worsening symptoms please return to the emergency department.    Thank you for choosing [facility]'s Emergency Department. It was a pleasure taking care of you today. If you have any questions, please call [phone].    Iraida Maurer PA-C

## 2022-05-14 NOTE — ED TRIAGE NOTES
Triage Assessment     Row Name 05/14/22 [id number]       Triage Assessment (Adult)    Airway WDL WDL       Respiratory WDL    Respiratory WDL WDL       Skin Circulation/Temperature WDL    Skin Circulation/Temperature WDL WDL       Cardiac WDL    Cardiac WDL WDL       Peripheral/Neurovascular WDL    Peripheral Neurovascular WDL WDL       Cognitive/Neuro/Behavioral WDL    Cognitive/Neuro/Behavioral WDL WDL            fell off her horse and hit the back of her head.

## 2022-05-16 ENCOUNTER — PATIENT OUTREACH (OUTPATIENT)
Dept: FAMILY MEDICINE | Facility: OTHER | Age: 42
End: 2022-05-16
Payer: COMMERCIAL

## 2022-05-16 NOTE — TELEPHONE ENCOUNTER
Reason for follow up call: Edda Schmidt appeared on our list for being seen in and recenlty discharge from the Emergency Room/In Patient Hospital Admission.    Chief Complaint   Patient presents with     Hospital F/U     ED: fall from horse; head injury       TCM Episode No    Encounter routed for Clinic Triage RN to call for follow up      Charlette Mcgregor, FRANCYN, RN, PHN  Registered Nurse-Clinic Triage  [facility]  5/16/2022 at 11:59 AM

## 2022-06-07 ENCOUNTER — MYC MEDICAL ADVICE (OUTPATIENT)
Dept: FAMILY MEDICINE | Facility: OTHER | Age: 42
End: 2022-06-07
Payer: COMMERCIAL

## 2022-06-07 DIAGNOSIS — T75.3XXD CAR SICKNESS, SUBSEQUENT ENCOUNTER: ICD-10-CM

## 2022-06-07 RX ORDER — ONDANSETRON 8 MG/1
8 TABLET, FILM COATED ORAL EVERY 8 HOURS PRN
Qty: 30 TABLET | Refills: 1 | Status: SHIPPED | OUTPATIENT
Start: 2022-06-07 | End: 2023-03-27

## 2022-06-16 ENCOUNTER — MYC MEDICAL ADVICE (OUTPATIENT)
Dept: FAMILY MEDICINE | Facility: OTHER | Age: 42
End: 2022-06-16
Payer: COMMERCIAL

## 2022-06-16 DIAGNOSIS — R45.86 MOOD SWINGS: Primary | ICD-10-CM

## 2022-06-16 RX ORDER — VENLAFAXINE HYDROCHLORIDE 37.5 MG/1
CAPSULE, EXTENDED RELEASE ORAL
Qty: 67 CAPSULE | Refills: 0 | Status: SHIPPED | OUTPATIENT
Start: 2022-06-16 | End: 2022-07-28

## 2022-06-16 NOTE — TELEPHONE ENCOUNTER
Physical done 5/11/22.      PHQ-2 Score:     PHQ-2 ( 1999 Pfizer) 5/8/2022 2/9/2022   Q1: Little interest or pleasure in doing things 1 0   Q2: Feeling down, depressed or hopeless 1 0   PHQ-2 Score 2 0   PHQ-2 Total Score (12-17 Years)- Positive if 3 or more points; Administer PHQ-A if positive - -   Q1: Little interest or pleasure in doing things Several days -   Q2: Feeling down, depressed or hopeless Several days -   PHQ-2 Score 2 -     Cornelia Perez RN on 6/16/2022 at 11:04 AM

## 2022-06-20 ENCOUNTER — LAB (OUTPATIENT)
Dept: LAB | Facility: CLINIC | Age: 42
End: 2022-06-20
Payer: COMMERCIAL

## 2022-06-20 DIAGNOSIS — Z11.59 NEED FOR HEPATITIS C SCREENING TEST: ICD-10-CM

## 2022-06-20 DIAGNOSIS — Z11.4 SCREENING FOR HIV (HUMAN IMMUNODEFICIENCY VIRUS): ICD-10-CM

## 2022-06-20 PROCEDURE — 87389 HIV-1 AG W/HIV-1&-2 AB AG IA: CPT

## 2022-06-20 PROCEDURE — 36415 COLL VENOUS BLD VENIPUNCTURE: CPT

## 2022-06-20 PROCEDURE — 86803 HEPATITIS C AB TEST: CPT

## 2022-06-21 ENCOUNTER — OFFICE VISIT (OUTPATIENT)
Dept: VASCULAR SURGERY | Facility: CLINIC | Age: 42
End: 2022-06-21
Payer: COMMERCIAL

## 2022-06-21 DIAGNOSIS — I83.893 VARICOSE VEINS OF BOTH LEGS WITH EDEMA: ICD-10-CM

## 2022-06-21 DIAGNOSIS — I83.813 VARICOSE VEINS OF BOTH LOWER EXTREMITIES WITH PAIN: Primary | ICD-10-CM

## 2022-06-21 LAB
HCV AB SERPL QL IA: NONREACTIVE
HIV 1+2 AB+HIV1 P24 AG SERPL QL IA: NONREACTIVE

## 2022-06-21 PROCEDURE — 99203 OFFICE O/P NEW LOW 30 MIN: CPT | Performed by: SPECIALIST

## 2022-06-21 NOTE — LETTER
6/21/2022         RE: Edda Schmidt  [address] [address] [place] [id number]        Dear Colleague,    Thank you for referring your patient, Edda Schmidt, to the [facility] [place]. Please see a copy of my visit note below.    Consult requested by Dr. Duenas    Reason for consultation: Bilateral lower extremity varicose veins with pain and swelling    HPI:  Patient is a 42-year-old white female with a 20-year history of bilateral lower extremity varicose veins.  They have been progressively worsening over that time.  She reports pain and swelling worse at the end the day.  She has been wearing compression socks for many years but is now having symptoms again despite compression therapy.  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  She did have some form of stripping and sclerotherapy done about 10 years ago.  She did get some relief at that time but now the symptoms have returned.  There is a family history of varicose veins on the paternal side of her family.  She denies any leg trauma, DVT, superficial phlebitis or nonhealing wounds.  She now presents for evaluation of her lower extremity varicose veins and pain.    Past Medical History:   Diagnosis Date     Car sickness      Motion sickness      Past Surgical History:   Procedure Laterality Date     CYSTOSCOPY, SLING TRANSVAGINAL N/A 2/15/2022    Procedure: CREATION, VAGINAL SLING, WITH CYSTOSCOPY  (Support the urethra with mesh and look in the bladder);  Surgeon: Jeremy Sherman MD;  Location: [facility] OR     INJECT EPIDURAL LUMBAR Left 5/10/2019    Procedure: left sided Lumbar 4-5 translaminar injection;  Surgeon: Jordy Tanner MD;  Location: [facility] OR     Current Outpatient Medications   Medication     clobetasol (TEMOVATE) 0.05 % external cream     clobetasol (TEMOVATE) 0.05 % external ointment     diclofenac (VOLTAREN) 75 MG EC tablet     methocarbamol (ROBAXIN) 750 MG  tablet     Misc Natural Products (COSAMIN ASU FOR JOINT HEALTH PO)     ondansetron (ZOFRAN) 8 MG tablet     venlafaxine (EFFEXOR XR) 37.5 MG 24 hr capsule     No current facility-administered medications for this visit.      No Known Allergies    Social History     Socioeconomic History     Marital status:      Spouse name: Not on file     Number of children: Not on file     Years of education: Not on file     Highest education level: Not on file   Occupational History     Not on file   Tobacco Use     Smoking status: Never Smoker     Smokeless tobacco: Never Used   Vaping Use     Vaping Use: Never used   Substance and Sexual Activity     Alcohol use: Yes     Drug use: No     Sexual activity: Yes     Partners: Female     Birth control/protection: None   Other Topics Concern     Parent/sibling w/ CABG, MI or angioplasty before 65F 55M? Not Asked   Social History Narrative     Not on file     Social Determinants of Health     Financial Resource Strain: Not on file   Food Insecurity: Not on file   Transportation Needs: Not on file   Physical Activity: Not on file   Stress: Not on file   Social Connections: Not on file   Intimate Partner Violence: Not on file   Housing Stability: Not on file     No family history on file.     ROS: 10 point ROS neg other than the symptoms noted above in the HPI.    PE:  B/P: Data Unavailable, T: Data Unavailable, P: Data Unavailable, R: Data Unavailable  General: well developed, well nourished WF who appears their stated age  HEENT: NC/AT, EOMI, (-)icterus, (-)injection  Neck: Supple, No JVD  Chest: CTA  Heart: S1, S2, (-)m/r/g  Abd: Soft, non tender, non distended, non tender, no masses  Ext; Warm, +1 edema bilaterally.  Bilateral varicose veins right greater than left.  Bilateral spider veins.  No stasis changes.  Psych: AAOx3  Neuro: No focal deficits      Impression/plan:  This is a 42-year-old lady with bilateral symptomatic varicose veins despite compression therapy.  Her  right leg is worse than her left.  She is a CEAP 3 bilaterally.  I discussed the role of compression therapy as well as ultrasound and the patient expressed understanding.  She also has some spider veins and I explained those are cosmetic and again she expressed understanding.  After discussion with the patient the plan at this time is obtain an ultrasound and proceed based on those findings.  Any further treatment plans be determined by the ultrasound findings.  She will follow-up with me after the ultrasound.  She is going to think about whether she wishes to proceed to any cosmetic sclerotherapy.    Neal Roa MD, FACS      Again, thank you for allowing me to participate in the care of your patient.        Sincerely,        Neal Roa MD

## 2022-06-21 NOTE — NURSING NOTE
Patient Reported symptoms:    Right leg   Heaviness Most of the time   Achiness Most of the time   Swelling Most of the time   Throbbing Most of the time   Itching A good bit of the time   Appearance Very noticeable   Impact on work/activities Symptoms but full able to participate    Left Leg   Heaviness Most of the time   Achiness Most of the time   Swelling Most of the time   Throbbing Most of the time   Itching A good bit of the time   Appearance Very noticeable   Impact on work/activities Symptoms but full able to participate

## 2022-06-21 NOTE — PROGRESS NOTES
Consult requested by Dr. Duenas    Reason for consultation: Bilateral lower extremity varicose veins with pain and swelling    HPI:  Patient is a 42-year-old white female with a 20-year history of bilateral lower extremity varicose veins.  They have been progressively worsening over that time.  She reports pain and swelling worse at the end the day.  She has been wearing compression socks for many years but is now having symptoms again despite compression therapy.  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  She did have some form of stripping and sclerotherapy done about 10 years ago.  She did get some relief at that time but now the symptoms have returned.  There is a family history of varicose veins on the paternal side of her family.  She denies any leg trauma, DVT, superficial phlebitis or nonhealing wounds.  She now presents for evaluation of her lower extremity varicose veins and pain.    Past Medical History:   Diagnosis Date     Car sickness      Motion sickness      Past Surgical History:   Procedure Laterality Date     CYSTOSCOPY, SLING TRANSVAGINAL N/A 2/15/2022    Procedure: CREATION, VAGINAL SLING, WITH CYSTOSCOPY  (Support the urethra with mesh and look in the bladder);  Surgeon: Jeremy Sherman MD;  Location: [facility] OR     INJECT EPIDURAL LUMBAR Left 5/10/2019    Procedure: left sided Lumbar 4-5 translaminar injection;  Surgeon: Jordy Tanner MD;  Location:  OR     Current Outpatient Medications   Medication     clobetasol (TEMOVATE) 0.05 % external cream     clobetasol (TEMOVATE) 0.05 % external ointment     diclofenac (VOLTAREN) 75 MG EC tablet     methocarbamol (ROBAXIN) 750 MG tablet     Misc Natural Products (COSAMIN ASU FOR JOINT HEALTH PO)     ondansetron (ZOFRAN) 8 MG tablet     venlafaxine (EFFEXOR XR) 37.5 MG 24 hr capsule     No current facility-administered medications for this visit.      No Known Allergies    Social  History     Socioeconomic History     Marital status:      Spouse name: Not on file     Number of children: Not on file     Years of education: Not on file     Highest education level: Not on file   Occupational History     Not on file   Tobacco Use     Smoking status: Never Smoker     Smokeless tobacco: Never Used   Vaping Use     Vaping Use: Never used   Substance and Sexual Activity     Alcohol use: Yes     Drug use: No     Sexual activity: Yes     Partners: Female     Birth control/protection: None   Other Topics Concern     Parent/sibling w/ CABG, MI or angioplasty before 65F 55M? Not Asked   Social History Narrative     Not on file     Social Determinants of Health     Financial Resource Strain: Not on file   Food Insecurity: Not on file   Transportation Needs: Not on file   Physical Activity: Not on file   Stress: Not on file   Social Connections: Not on file   Intimate Partner Violence: Not on file   Housing Stability: Not on file     No family history on file.     ROS: 10 point ROS neg other than the symptoms noted above in the HPI.    PE:  B/P: Data Unavailable, T: Data Unavailable, P: Data Unavailable, R: Data Unavailable  General: well developed, well nourished WF who appears their stated age  HEENT: NC/AT, EOMI, (-)icterus, (-)injection  Neck: Supple, No JVD  Chest: CTA  Heart: S1, S2, (-)m/r/g  Abd: Soft, non tender, non distended, non tender, no masses  Ext; Warm, +1 edema bilaterally.  Bilateral varicose veins right greater than left.  Bilateral spider veins.  No stasis changes.  Psych: AAOx3  Neuro: No focal deficits      Impression/plan:  This is a 42-year-old lady with bilateral symptomatic varicose veins despite compression therapy.  Her right leg is worse than her left.  She is a CEAP 3 bilaterally.  I discussed the role of compression therapy as well as ultrasound and the patient expressed understanding.  She also has some spider veins and I explained those are cosmetic and again she  expressed understanding.  After discussion with the patient the plan at this time is obtain an ultrasound and proceed based on those findings.  Any further treatment plans be determined by the ultrasound findings.  She will follow-up with me after the ultrasound.  She is going to think about whether she wishes to proceed to any cosmetic sclerotherapy.    Neal Roa MD, FACS

## 2022-06-27 ENCOUNTER — ANCILLARY PROCEDURE (OUTPATIENT)
Dept: ULTRASOUND IMAGING | Facility: CLINIC | Age: 42
End: 2022-06-27
Attending: SPECIALIST
Payer: COMMERCIAL

## 2022-06-27 DIAGNOSIS — I83.813 VARICOSE VEINS OF BOTH LOWER EXTREMITIES WITH PAIN: ICD-10-CM

## 2022-06-27 PROCEDURE — 93970 EXTREMITY STUDY: CPT | Performed by: SPECIALIST

## 2022-07-27 ENCOUNTER — VIRTUAL VISIT (OUTPATIENT)
Dept: VASCULAR SURGERY | Facility: CLINIC | Age: 42
End: 2022-07-27
Payer: COMMERCIAL

## 2022-07-27 ENCOUNTER — E-VISIT (OUTPATIENT)
Dept: FAMILY MEDICINE | Facility: OTHER | Age: 42
End: 2022-07-27

## 2022-07-27 DIAGNOSIS — I83.813 VARICOSE VEINS OF BOTH LOWER EXTREMITIES WITH PAIN: Primary | ICD-10-CM

## 2022-07-27 DIAGNOSIS — I83.893 VARICOSE VEINS OF BOTH LEGS WITH EDEMA: ICD-10-CM

## 2022-07-27 DIAGNOSIS — R45.86 MOOD SWINGS: ICD-10-CM

## 2022-07-27 PROCEDURE — 99421 OL DIG E/M SVC 5-10 MIN: CPT | Performed by: FAMILY MEDICINE

## 2022-07-27 PROCEDURE — 99213 OFFICE O/P EST LOW 20 MIN: CPT | Mod: 95 | Performed by: SPECIALIST

## 2022-07-27 NOTE — LETTER
2022         RE: Edda Schmidt  [address] [address] [place] [id number]        Dear Colleague,    Thank you for referring your patient, Edda Schmidt, to the [facility] [place]. Please see a copy of my visit note below.    Janeen is a 42 year old who is being evaluated via a billable video visit.      How would you like to obtain your AVS? MyChart  If the video visit is dropped, the invitation should be resent by: Text to cell phone: [phone]  Will anyone else be joining your video visit? No      Video-Visit Details    Video Start Time: 1:47 PM    Type of service:  Video Visit     follow-up of ultrasound       Subjective:   Patient is a 42-year-old white female with a 20-year history of bilateral lower extremity varicose veins.  They have been progressively worsening over that time.  She reports pain and swelling worse at the end the day.  She has been wearing compression socks for many years but is now having symptoms again despite compression therapy.  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  She did have some form of stripping and sclerotherapy done about 10 years ago.  She did get some relief at that time but now the symptoms have returned.  There is a family history of varicose veins on the paternal side of her family.  She denies any leg trauma, DVT, superficial phlebitis or nonhealing wounds.        She had her ultrasound which she now follows up.      Objective:  Ext; Warm, +1 edema bilaterally.  Bilateral varicose veins right greater than left.  Bilateral spider veins.  No stasis changes.    US -   Name:  Edda Schmidt                                                    Patient ID: [id number]  Date: 2022                                                   : 1980  Sex: female                                                                 Examined by: APRIL Hampton RVT  Age:  42 year  old                                                         Reading MD: Neal Roa MD     INDICATION:  Bilateral varicose veins with pain; hx of left phlebectomy at outside clinic.      EXAM TYPE  BILATERAL LOWER EXTREMITY VENOUS DUPLEX FOR VENOUS INSUFFICIENCY  TECHNICAL SUMMARY     A duplex ultrasound study using color flow was performed, to evaluate the bilateral lower extremity veins for valvular incompetence with the patient in a steep reversed trendelenberg.      RIGHT:     The deep veins demonstrate phasic flow, compress and respond to augmentations.  There is no  DVT.  The common femoral, femoral and popliteal veins are incompetent and free of thrombus.      The GSV demonstrates phasic flow, compresses and responds to augmentations from the saphenofemoral junction to the ankle with no evidence of thrombus. The great saphenous vein measures 9.5 mm at the saphenofemoral junction, 4.0 mm at the proximal thigh and 4.9 mm at the knee. The GSV is incompetent at the SFJ, mid thigh and from the proximal to distal calf, with the greatest reflux time of 3415 milliseconds.  The GSV gives rise to multiple incompetent varicose veins, the largest measures 8.7 mm off the Knee that courses Anteromedial to communicate with varicose vein off the accessory saphenous vein with a reflux time of 3332 milliseconds.      The AASV is incompetent ( 12.4 mm) draining into the saphenofemoral junction. The AASV is incompetent from the saphenofemoal junction to the proximal thigh with a reflux time of 3761 milliseconds. The AASV takes a straight course for 19 cm.  The AASV gives rise to a varicose branch measuring 10.9 mm off the Proximal Thigh that courses Anteromedial with a reflux time of 3052 milliseconds.      The Giacomini vein is incompetent ( 2.4 mm) communicating with the small saphenous vein at the knee level. The Giacomini vein is incompetent from the distal thigh to proximal thigh with a reflux time of 1914  milliseconds.      The SSV demonstrates phasic flow, compresses and responds to augmentations from the popliteal space to the ankle.  No  thrombus is seen. The saphenopopliteal junction is competent (4.6 mm).  The SSV is incompetent from the Mid Calf to Distal Calf with a reflux time of 2738 milliseconds.  The SSV gives rise to a varicose branch measuring 5.3 mm off the Proximal Calf that courses Medial with a reflux time of 2260 milliseconds.       Perforators: There is an incompetent  vein ( 3.0 mm) at distal calf  11 cm from medial mallelous that communicates with an incompetent varicose vein.            LEFT:     The deep veins demonstrate phasic flow, compress and respond to augmentations.  There is no  DVT.  The common femoral and popliteal veins are incompetent and free of thrombus. The remaining deep veins are competent and free of thrombus.      The GSV demonstrates phasic flow, compresses and responds to augmentations from the saphenofemoral junction to the ankle with no evidence of  thrombus. The great saphenous vein measures 7.0 mm at the saphenofemoral junction, 5.6 mm at the proximal thigh and 5.6 mm at the knee. The GSV is incompetent from the SFJ to proximal thigh and from the knee to mid calf, with the greatest reflux time of 5774 milliseconds.  The GSV gives rise to multiple incompetent varicose veins, the largest measures 8.2 mm off the Proximal Calf that courses Anteromedial with a reflux time of 4438 milliseconds.      The AASV is incompetent ( 8.8 mm) draining into the saphenofemoral junction. The AASV is incompetent from the saphenofemoal junction to the proximal tihgh with a reflux time of 3580 milliseconds. The AASV takes a straight course for 13 cm.  The AASV gives rise to a varicose branch measuring 6.7 mm off the Proximal Thigh that courses Anteromedial  and anterolateral with a reflux time of 3563 milliseconds.      The Giacomini vein is competent ( 4.1 mm) communicating with  the small saphenous vein at the knee level. The Giacomini gives rise to an incompetent varicose vein (5.4 mm) off the distal thigh coursing lateral with a reflux time of 1006 milliseconds.      The SSV demonstrates phasic flow, compresses and responds to augmentations from the popliteal space to the ankle.  No thrombus is seen. The saphenopopliteal junction is competent ( 5.5 mm).  The SSV is incompetent at the proximal calf with a reflux time of 4388 milliseconds.  The SSV gives rise to multiple incompetent varicose veins, the largest measuring 5.5 mm off the Mid Calf that courses Medial with a reflux time of 2425 milliseconds.     Perforators:  There is an incompetent  vein ( 2.3 mm) at distal calf  10 cm from medial mallelous that communicates with an incompetent varicose vein. There is a second incompetent  vein (3.3 mm) at the mid calf 18 cm above the medial mallelous that communicates with an incompetent varicose vein.         FINAL SUMMARY:  1.         No evidence of bilateral lower extremity deep vein thrombus.  2.         Right common femoral , femoral and popliteal vein incompetence.  3.         Left common femoral and popliteal vein incompetence.  4.         Right great saphenous vein incompetence.  5.         Right accessory saphenous vein incompetence.  6.         Right giacomini vein incompetence.  7.         Right small saphenous vein incompetence.  8.         Right incompetent  vein.  9.         Right incompetent varicose veins.  10.       Left great saphenous vein incompetence.  11.       Left accessory saphenous vein incompetence.  12.       Left small saphenous vein incompetence.  13.       Left incompetent  veins.  14.       Left incompetent varicose veins.   15.       The time of incompetence is greater than 500 milliseconds in the superficial and  veins and greater than 1000 milliseconds in the deep veins.         Neal Roa MD,  FACS    Impression/plan:  This is a 42-year-old lady with bilateral symptomatic varicose veins despite compression therapy.  Her right leg is worse than her left.  She is a CEAP 3 bilaterally.  Her ultrasound revealed bilateral AAS V and GSV reflux.  She also has some other refluxing veins but those are also too small to treat.  Based on her ultrasound she is a candidate for a right GSV, ASV RF ablation and stab phlebectomies.  On the left she is a candidate for a GSV, ASV RF ablations with medically necessary stab phlebectomies.    The procedure, risks, benefits, and alternatives were discussed and the patient agrees to proceed.  The right leg will be done first that is more symptomatic.  She will be scheduled once insurance approval is obtained.    Neal Roa MD, FACS        Video End Time:2:01 PM    Originating Location (pt. Location): Home    Distant Location (provider location):  [facility] [place]     Platform used for Video Visit: [employer]    .  ..      Again, thank you for allowing me to participate in the care of your patient.        Sincerely,        Neal Roa MD

## 2022-07-27 NOTE — PROGRESS NOTES
Janeen is a 42 year old who is being evaluated via a billable video visit.      How would you like to obtain your AVS? MyChart  If the video visit is dropped, the invitation should be resent by: Text to cell phone: [phone]  Will anyone else be joining your video visit? No      Video-Visit Details    Video Start Time: 1:47 PM    Type of service:  Video Visit     follow-up of ultrasound       Subjective:   Patient is a 42-year-old white female with a 20-year history of bilateral lower extremity varicose veins.  They have been progressively worsening over that time.  She reports pain and swelling worse at the end the day.  She has been wearing compression socks for many years but is now having symptoms again despite compression therapy.  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  She did have some form of stripping and sclerotherapy done about 10 years ago.  She did get some relief at that time but now the symptoms have returned.  There is a family history of varicose veins on the paternal side of her family.  She denies any leg trauma, DVT, superficial phlebitis or nonhealing wounds.        She had her ultrasound which she now follows up.      Objective:  Ext; Warm, +1 edema bilaterally.  Bilateral varicose veins right greater than left.  Bilateral spider veins.  No stasis changes.    US -   Name:  Edda Schmidt                                                    Patient ID: [id number]  Date: 2022                                                   : 1980  Sex: female                                                                 Examined by: APRIL Hampton RVT  Age:  42 year old                                                         Reading MD: Neal Roa MD     INDICATION:  Bilateral varicose veins with pain; hx of left phlebectomy at outside clinic.      EXAM TYPE  BILATERAL LOWER EXTREMITY VENOUS DUPLEX FOR VENOUS  INSUFFICIENCY  TECHNICAL SUMMARY     A duplex ultrasound study using color flow was performed, to evaluate the bilateral lower extremity veins for valvular incompetence with the patient in a steep reversed trendelenberg.      RIGHT:     The deep veins demonstrate phasic flow, compress and respond to augmentations.  There is no  DVT.  The common femoral, femoral and popliteal veins are incompetent and free of thrombus.      The GSV demonstrates phasic flow, compresses and responds to augmentations from the saphenofemoral junction to the ankle with no evidence of thrombus. The great saphenous vein measures 9.5 mm at the saphenofemoral junction, 4.0 mm at the proximal thigh and 4.9 mm at the knee. The GSV is incompetent at the SFJ, mid thigh and from the proximal to distal calf, with the greatest reflux time of 3415 milliseconds.  The GSV gives rise to multiple incompetent varicose veins, the largest measures 8.7 mm off the Knee that courses Anteromedial to communicate with varicose vein off the accessory saphenous vein with a reflux time of 3332 milliseconds.      The AASV is incompetent ( 12.4 mm) draining into the saphenofemoral junction. The AASV is incompetent from the saphenofemoal junction to the proximal thigh with a reflux time of 3761 milliseconds. The AASV takes a straight course for 19 cm.  The AASV gives rise to a varicose branch measuring 10.9 mm off the Proximal Thigh that courses Anteromedial with a reflux time of 3052 milliseconds.      The Giacomini vein is incompetent ( 2.4 mm) communicating with the small saphenous vein at the knee level. The Giacomini vein is incompetent from the distal thigh to proximal thigh with a reflux time of 1914 milliseconds.      The SSV demonstrates phasic flow, compresses and responds to augmentations from the popliteal space to the ankle.  No  thrombus is seen. The saphenopopliteal junction is competent (4.6 mm).  The SSV is incompetent from the Mid Calf to Distal  Calf with a reflux time of 2738 milliseconds.  The SSV gives rise to a varicose branch measuring 5.3 mm off the Proximal Calf that courses Medial with a reflux time of 2260 milliseconds.       Perforators: There is an incompetent  vein ( 3.0 mm) at distal calf  11 cm from medial mallelous that communicates with an incompetent varicose vein.            LEFT:     The deep veins demonstrate phasic flow, compress and respond to augmentations.  There is no  DVT.  The common femoral and popliteal veins are incompetent and free of thrombus. The remaining deep veins are competent and free of thrombus.      The GSV demonstrates phasic flow, compresses and responds to augmentations from the saphenofemoral junction to the ankle with no evidence of  thrombus. The great saphenous vein measures 7.0 mm at the saphenofemoral junction, 5.6 mm at the proximal thigh and 5.6 mm at the knee. The GSV is incompetent from the SFJ to proximal thigh and from the knee to mid calf, with the greatest reflux time of 5774 milliseconds.  The GSV gives rise to multiple incompetent varicose veins, the largest measures 8.2 mm off the Proximal Calf that courses Anteromedial with a reflux time of 4438 milliseconds.      The AASV is incompetent ( 8.8 mm) draining into the saphenofemoral junction. The AASV is incompetent from the saphenofemoal junction to the proximal tihgh with a reflux time of 3580 milliseconds. The AASV takes a straight course for 13 cm.  The AASV gives rise to a varicose branch measuring 6.7 mm off the Proximal Thigh that courses Anteromedial  and anterolateral with a reflux time of 3563 milliseconds.      The Giacomini vein is competent ( 4.1 mm) communicating with the small saphenous vein at the knee level. The Giacomini gives rise to an incompetent varicose vein (5.4 mm) off the distal thigh coursing lateral with a reflux time of 1006 milliseconds.      The SSV demonstrates phasic flow, compresses and responds to  augmentations from the popliteal space to the ankle.  No thrombus is seen. The saphenopopliteal junction is competent ( 5.5 mm).  The SSV is incompetent at the proximal calf with a reflux time of 4388 milliseconds.  The SSV gives rise to multiple incompetent varicose veins, the largest measuring 5.5 mm off the Mid Calf that courses Medial with a reflux time of 2425 milliseconds.     Perforators:  There is an incompetent  vein ( 2.3 mm) at distal calf  10 cm from medial mallelous that communicates with an incompetent varicose vein. There is a second incompetent  vein (3.3 mm) at the mid calf 18 cm above the medial mallelous that communicates with an incompetent varicose vein.         FINAL SUMMARY:  1.         No evidence of bilateral lower extremity deep vein thrombus.  2.         Right common femoral , femoral and popliteal vein incompetence.  3.         Left common femoral and popliteal vein incompetence.  4.         Right great saphenous vein incompetence.  5.         Right accessory saphenous vein incompetence.  6.         Right giacomini vein incompetence.  7.         Right small saphenous vein incompetence.  8.         Right incompetent  vein.  9.         Right incompetent varicose veins.  10.       Left great saphenous vein incompetence.  11.       Left accessory saphenous vein incompetence.  12.       Left small saphenous vein incompetence.  13.       Left incompetent  veins.  14.       Left incompetent varicose veins.   15.       The time of incompetence is greater than 500 milliseconds in the superficial and  veins and greater than 1000 milliseconds in the deep veins.         Neal Roa MD, FACS    Impression/plan:  This is a 42-year-old lady with bilateral symptomatic varicose veins despite compression therapy.  Her right leg is worse than her left.  She is a CEAP 3 bilaterally.  Her ultrasound revealed bilateral AAS V and GSV reflux.  She also has  some other refluxing veins but those are also too small to treat.  Based on her ultrasound she is a candidate for a right GSV, ASV RF ablation and stab phlebectomies.  On the left she is a candidate for a GSV, ASV RF ablations with medically necessary stab phlebectomies.    The procedure, risks, benefits, and alternatives were discussed and the patient agrees to proceed.  The right leg will be done first that is more symptomatic.  She will be scheduled once insurance approval is obtained.    Neal Roa MD, FACS        Video End Time:2:01 PM    Originating Location (pt. Location): Home    Distant Location (provider location):  [facility] [place]     Platform used for Video Visit: [employer]    .  ..

## 2022-07-27 NOTE — PATIENT INSTRUCTIONS
Pre-Procedure Instructions:                           VNUS Closure and Phlebectomies  You are having a Closure(s) - where one or more veins are closed and Phlebectomies - where one or more veins are removed.    Insurance  Precertification and/or referral authorization may be required by your insurance company.  We will call your insurance company to verify benefits for the medically necessary part of your procedure.    Your Current Medications and Allergies  To reduce bruising, please do not take aspirin-type medications (Motrin, Aleve, Ibuprofen, Advil, etc.) for three days before your procedure. You may take Tylenol if you need a pain reliever.  Are you on blood thinner medications? (Plavix, Coumadin, Eliquis, Xarelto) Please discuss this with your surgeon. You may resume taking your blood thinner medication after your procedure.  Are you sensitive to latex or adhesives used for fake fingernails? Please let us know!    Driving Escort and   Please arrange to have a trusted adult (18 years old or older) drive you to and from the clinic.  For your safety, we recommend you have a trusted adult to stay with you until the next morning.    Your Health  If you have a change in your health before the procedure, contact our office immediately.   (For example: cold symptoms, cough, urinary tract infection, fever, flu symptoms).  A pre-procedure physical is not required.    Note  It is sometimes necessary to adjust the procedure schedule due to emergencies. We greatly appreciate your flexibility and understanding in this matter.  Compression hose are needed following this procedure.                   Check List: The Morning of Your Procedure  ___1. Please do not put anything on your leg(s) or shave the day of your procedure.  ___2. You may take your normal medications the day of your procedure.  ___3. It is recommended you eat a light breakfast or lunch the day of your procedure.  ___4. Wear comfortable  loose-fitting clothing and wide-fitting shoes (i.e. tennis shoes, slip-ons).  ___5. Please arrive at our clinic at the specified time given by the nurse.  ___6. You will sign an affirmation of informed consent.  ___7. Bring your pre-procedure sedation medication (lorazepam and clonidine) with you to the clinic. One hour              before your procedure, you will be instructed to take these medications. The lorazepam (Ativan) lowers              anxiety and sedates you; the clonidine makes the lorazepam more effective. Everyone's body processes              these medications differently. Therefore, reactions to these medications vary. Some people stay awake and              some people sleep through the whole procedure. You may not remember everything about the procedure              or the day. You do not want to make any big decisions for the rest of the day.     The Day of Your Procedure:       VNUS Closure and Phlebectomies  In the Exam Room  A nurse will bring you back to an exam room with your family member or friend. This is when your informed consent will be signed, and you will take your pre-procedure medications.  You will be asked to remove everything from the waist down, including undergarments. You will then put on a hospital gown or shorts and blue booties.  Your surgeon will come in to answer any questions and anthony any bulging varicose veins to be removed.  You will be taken to the restroom to empty your bladder before going into the procedure room.    In the Procedure Room  You will be escorted to the procedure room. You will lie on a procedure table covered with a sheet or blanket.  A nurse will put a blood pressure cuff on your arm and a pulse/oxygen monitor on a finger. Your vital signs will be monitored every 15 minutes.  Your gown will be pulled up slightly and the groin exposed for a short period of time. The surgeon's assistant will clean your foot, leg, and groin with an antibacterial  solution. We will get you covered up as quickly as possible!  Sterile towels and blue drapes will be used to cover you and the table. You will be asked to keep your hands under the blue drapes during the procedure.  The lights will be turned down. The table will be tipped so your head is higher than your feet. You may feel like you're going to slide off, but you won't.    The Procedure  The surgeon will visualize your veins with an ultrasound machine. He or she will then numb your skin and access the vein. A catheter is passed up the vein and positioned with ultrasound guidance. The table will then be tipped head down.  Once the catheter is in the correct position, medication will be injected to numb your leg. You will feel some needle sticks and may feel discomfort as the medication goes in. Once this is done, you should not experience significant discomfort. But if you do, please let us know and more numbing medication can be injected. As the catheter sends out heat, the vein closes off and the catheter is withdrawn.  For the phlebectomy part of the procedure, small incisions are made where the bulging varicose veins have been marked on your leg(s) and these veins will be removed using a small jonnie hook instrument.                    VNUS closure                  Phlebectomy    Post-Procedure  Once the procedure is done, your leg(s) will be washed with warm water and dried. Your leg(s) will be bandaged with large soft dressings and a large ace bandage wrapped from toes to groin.   You will be offered something to drink and a light snack.  You will rest with your leg(s) elevated for approximately 30 minutes. Your friend or family member may join you.  For your safety, you will be taken to your car in a wheelchair. If you are able to, it is good to keep your leg(s) elevated on the car ride home.    Post-Procedure Instructions:             VNUS Closure and Phlebectomies    Post-Op Day Zero - The Day of Your  Procedure:0  1. Medication for Pain Control and Inflammation Control   - The numbing medication injected during your procedure will last for several hours. The pre-procedure                 tablets may make you very sleepy and you might not remember everything from the procedure or from                 the day. This will usually wear off by the next day.   - Ibuprofen:  If tolerated, take ibuprofen (e.g., Advil) to reduce inflammation whether or not you have                 pain. For three days, take two tablets (200 mg each) with every meal and at bedtime with a snack. If                 your pain is not controlled with ibuprofen, you may take prescription pain medication (such as Norco),                 if prescribed.   - You may resume taking any medications you were taking before your procedure.  2. Activity   - Rest with your leg(s) elevated above your heart. This will prevent swelling and bleeding.                 You do not need to elevate your leg(s) while sleeping at night. You may go upstairs, sit up to                 eat, use the bathroom, and take several five-minute walks. Otherwise, keep your leg(s) elevated.                 Minimize the amount of time you are up on your feet to about 30 minutes at a time.  3. Bandages   - The incision sites will be covered with soft bandages and an ACE wrap. Keep your bandages on and       dry for 48 hours. The ACE should provide  snug  compression but should not cause pain or numbness       in the toes. If you have significant discomfort or your toes become cold or numb, unwrap your ACE and       rewrap with less tension starting at the toes wrapping upward.  4. Incisions   - Bleeding: You may see some incision sites that are oozing through the bandages. This is not unusual       and can be managed with Rest, Ice, Compression and Elevation (RICE). Apply ice and firm pressure       directly to the site that is bleeding and rest with your leg(s) elevated above your  heart for 20-30 minutes.    Post-Op Day One:  1. Medication   - Ibuprofen: Continue the same as the Day of Your Procedure. If your pain is not controlled with       ibuprofen, you may take prescription pain medication (such as Norco), if prescribed.   2. Activity   - We would like you to get up at least six times and walk around for short periods of time, unless it is       causing you pain. You should not be on your feet more than 90 minutes at a time. Elevate your leg       above your heart when you are not walking.  3. Bandages   - Your bandages must be kept on and dry for 48 hours.  4. Driving   - You may resume driving when you can do so safely. Do not drive if you are taking narcotic pain       medication.    Post-Op Day Two:   1. Medication  - Ibuprofen: Continue the same as the Day of Your Procedure.  2.  Activity  - Walk as tolerated. Elevate as much as possible when not walking.  3. Bandages and Compression  - Remove ACE wrap and padding. Shower and put on your compression hose during waking hours only for at least five days.    (Your doctor may instruct you to keep your bandages on until your return appointment; please follow your doctor's instructions.)  4. Incisions  - Your leg(s) will be bruised; there may be swelling, hard knots under the skin and possibly some numbness. These will likely resolve over time.   If you see  hair-like  strings coming out of your incisions, do not pull them (this will only cause pain/discomfort). We will trim them when you come back for your follow-up appointment.  5. Call Us If:   - You see any areas on your leg that are red and angry in appearance.   - You notice any drainage that is milky or cloudy in appearance or has a foul odor.   - You run a temperature of 100.5 or greater.    Post-Op Day Three:  You will have a follow up appointment 2-4 days post-procedure. At this appointment, you will have an ultrasound and we will check your incisions.     __________________________________________________________________________________________    The Two Weeks Following Your Procedure  1.  Skin Care   - Do not use any lotions, creams or powders on your incision(s) for 14 days or until the incisions have               healed.   - Do not soak in a bathtub, hot tub or go swimming for 14 days or until your incisions have healed.  2.  Medications   - You may use ibuprofen or acetaminophen (e.g., Tylenol) as needed for pain or discomfort.  3.  Activity   - Do not lift over 25 pounds. After about two weeks you may resume exercise such as aerobics, running,               tennis or weightlifting. Use your common sense and ease back into your exercise routine slowly.   - You may feel a cord-like tightness along the inside of your leg. Gentle stretching can be helpful.  4. Compression Hose   - Your doctor may instruct you to wear compression for longer than seven days; please    follow your doctor's instructions. As a comfort measure, you may choose to wear compression for    longer than required.  5.  Travel   - Do not fly in an airplane for 14 days after your procedure. If you have a long car trip planned within    two to three weeks following your procedure, stop and walk for a few minutes every two hours.    Periodic ankle pumps during the ride may be helpful.    Six Week Appointment  - At your six-week appointment, you will see your surgeon for an exam and evaluation. This office visit   will be scheduled when you return for post-op day three return appointment.     Return to Work  1.  If you work outside the home, you may return to work in a few days depending on the extent of your        procedure, how you tolerate it, and the type of work you perform.  2.  Paperwork: If your employer requires paperwork or you would like a letter written to your employer, please        let us know. We will complete disability type forms at no charge. Please allow five business days  for forms        to be completed.      [employer] last reviewed this educational content on 11/1/2019 (RFA photo), 12/1/2019 (Phlebectomy photo)    4805-4368 [employer]. All rights reserved. This information is not intended as a substitute for professional medical care. Always follow your healthcare professional's instructions.

## 2022-07-27 NOTE — PROGRESS NOTES
July 27, 2022    Vein Procedure Recommendation    Called and spoke with patient.    Dr. Roa has recommended patient to have the following vein procedure(s):     1. Right leg VNUS closure GSV, ASV and medically necessary phlebectomies    2. Left leg VNUS closure GSV, ASV and Phlebectomies (medically necessary)      Patient is recommended to wear Thigh High compression hose following her procedure. Discussed compression hose.  Per pt request, faxed their compression hose Rx to [facility] at [phone].   Pt would like 1 pair(s) of beige, closed-toe, thigh high, 20-30mmhg compression hose. Fax confirmed.    Pt aware they will be shipped to their home address.    Pt told  in [facility] her written procedure instructions are available to review on their own (see After Visit Summary).      Next steps:    Insurance Submission  Informed patient this process could take up to 14 business days, but once approved, the patient will be contacted by our surgery scheduler to schedule the above procedure. Gave patient our surgery scheduler's information.    Patient is in agreement with all of the above and has no further questions at this time.    Daniel Blas RN  [facility]  [facility]

## 2022-07-28 RX ORDER — VENLAFAXINE HYDROCHLORIDE 150 MG/1
150 CAPSULE, EXTENDED RELEASE ORAL DAILY
Qty: 30 CAPSULE | Refills: 0 | Status: SHIPPED | OUTPATIENT
Start: 2022-07-28 | End: 2022-09-07

## 2022-09-06 ENCOUNTER — MYC MEDICAL ADVICE (OUTPATIENT)
Dept: FAMILY MEDICINE | Facility: OTHER | Age: 42
End: 2022-09-06

## 2022-09-07 ENCOUNTER — MYC REFILL (OUTPATIENT)
Dept: OBGYN | Facility: CLINIC | Age: 42
End: 2022-09-07

## 2022-09-07 ENCOUNTER — E-VISIT (OUTPATIENT)
Dept: FAMILY MEDICINE | Facility: OTHER | Age: 42
End: 2022-09-07
Payer: COMMERCIAL

## 2022-09-07 DIAGNOSIS — N90.89 VULVAR IRRITATION: ICD-10-CM

## 2022-09-07 DIAGNOSIS — R45.86 MOOD SWINGS: ICD-10-CM

## 2022-09-07 PROCEDURE — 99421 OL DIG E/M SVC 5-10 MIN: CPT | Performed by: FAMILY MEDICINE

## 2022-09-07 RX ORDER — VENLAFAXINE HYDROCHLORIDE 150 MG/1
150 CAPSULE, EXTENDED RELEASE ORAL DAILY
Qty: 90 CAPSULE | Refills: 1 | Status: SHIPPED | OUTPATIENT
Start: 2022-09-07 | End: 2023-03-26

## 2022-09-07 RX ORDER — CLOBETASOL PROPIONATE 0.5 MG/G
OINTMENT TOPICAL AT BEDTIME
Qty: 60 G | Refills: 0 | Status: SHIPPED | OUTPATIENT
Start: 2022-09-07 | End: 2024-08-19

## 2022-09-07 ASSESSMENT — ANXIETY QUESTIONNAIRES
7. FEELING AFRAID AS IF SOMETHING AWFUL MIGHT HAPPEN: NOT AT ALL
8. IF YOU CHECKED OFF ANY PROBLEMS, HOW DIFFICULT HAVE THESE MADE IT FOR YOU TO DO YOUR WORK, TAKE CARE OF THINGS AT HOME, OR GET ALONG WITH OTHER PEOPLE?: NOT DIFFICULT AT ALL
GAD7 TOTAL SCORE: 1

## 2022-09-07 NOTE — PATIENT INSTRUCTIONS
Thank you for choosing us for your care. I have placed an order for a prescription so that you can start treatment. View your full visit summary for details by clicking on the link below. Your pharmacist will able to address any questions you may have about the medication.     If you're not feeling better within 5-7 days, please schedule an appointment.  You can schedule an appointment right here in [facility], or call [phone]  If the visit is for the same symptoms as your eVisit, we'll refund the cost of your eVisit if seen within seven days.

## 2022-09-07 NOTE — TELEPHONE ENCOUNTER
Requested Prescriptions   Pending Prescriptions Disp Refills     clobetasol (TEMOVATE) 0.05 % external ointment 60 g 0     Sig: Apply topically At Bedtime       There is no refill protocol information for this order        Last Written Prescription Date:  11/24/21  Last Fill Quantity: 60 g,  # refills: 0   Last office visit: 11/24/2021 with prescribing provider:  Dr. Durand for vulvar irritation and urinary incontinence  Future Office Visit:      Routing refill request to provider for review/approval because:  Drug not on the [facility] refill protocol     Rupa Fuchs RN on 9/7/2022 at 1:59 PM

## 2022-09-18 ENCOUNTER — HEALTH MAINTENANCE LETTER (OUTPATIENT)
Age: 42
End: 2022-09-18

## 2022-09-27 DIAGNOSIS — I83.813 VARICOSE VEINS OF BOTH LOWER EXTREMITIES WITH PAIN: Primary | ICD-10-CM

## 2022-10-17 ENCOUNTER — OFFICE VISIT (OUTPATIENT)
Dept: OBGYN | Facility: OTHER | Age: 42
End: 2022-10-17
Payer: COMMERCIAL

## 2022-10-17 VITALS — SYSTOLIC BLOOD PRESSURE: 124 MMHG | WEIGHT: 218 LBS | BODY MASS INDEX: 35.8 KG/M2 | DIASTOLIC BLOOD PRESSURE: 81 MMHG

## 2022-10-17 DIAGNOSIS — N88.2 CERVICAL OS STENOSIS: ICD-10-CM

## 2022-10-17 DIAGNOSIS — N92.1 MENOMETRORRHAGIA: Primary | ICD-10-CM

## 2022-10-17 PROCEDURE — 58100 BIOPSY OF UTERUS LINING: CPT | Mod: 52 | Performed by: OBSTETRICS & GYNECOLOGY

## 2022-10-17 PROCEDURE — 99213 OFFICE O/P EST LOW 20 MIN: CPT | Mod: 25 | Performed by: OBSTETRICS & GYNECOLOGY

## 2022-10-17 NOTE — PROGRESS NOTES
Subjective  42 year old non-pregnant female presents today complaining of menorrhagia.  Patient's menses have been coming closer together.  This has been getting worse for the last 6 months.  She has called sick into work due to the amount of bleeding.  Patient is using pads and she is changing them every hour.  She passes large clots.  She coughs and fills up a pad.  Her last menstrual period was 10/1.  She bleeds for 5 days and the majority of the time it is heavy bleeding.  Day 5 is lighter.  She does not use tampons because they fall out.  She does have dizziness and lightheadedness during her menses.  She has soiled her sheets at bedtime.  Some dysmenorrhea.  She has a headache before the menses starts.  She takes Tylenol and Motrin during this time regularly with minimal relief.  No problems urinating.  Normal bowel movements.  Patient is sexually active.  No dyspareunia.  No vaginal spotting after.  Female partner.  1  and 1 c section for breech.  Patient has had a Mirena in the past and states insertion was very painful.  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  Paternal aunt with breast cancer.  No tobacco abuse.  We discussed possible causes for her menorrhagia.  I recommended a pelvic ultrasound and endometrial biopsy.  Patient is wanting to do the endometrial biopsy today.  We discussed medical versus surgical options in some detail.      Notes reviewed by Dr. Boggs regarding urinary incontinence.    ROS: 10 point ROS neg other than the symptoms noted above in the HPI.  Past Medical History:   Diagnosis Date     Car sickness      Motion sickness      Past Surgical History:   Procedure Laterality Date     CYSTOSCOPY, SLING TRANSVAGINAL N/A 2/15/2022    Procedure: CREATION, VAGINAL SLING, WITH CYSTOSCOPY  (Support the urethra with mesh and look in the bladder);  Surgeon: Jeremy Sherman MD;  Location: [facility] OR     INJECT EPIDURAL LUMBAR Left 5/10/2019    Procedure: left sided Lumbar 4-5  translaminar injection;  Surgeon: Jordy Tanner MD;  Location: [facility]     History reviewed. No pertinent family history.  Social History     Tobacco Use     Smoking status: Never     Smokeless tobacco: Never   Substance Use Topics     Alcohol use: Yes         Objective  Vitals: /81 (BP Location: Right arm, Cuff Size: Adult Regular)   Wt 98.9 kg (218 lb)   LMP 10/01/2022   BMI 35.80 kg/m    BMI= Body mass index is 35.8 kg/m .    General appearance=well developed, well-nourished female  Gait=normal  Psych=mood is stable, alert and oriented x3  Abd=soft, Nontender/nondistended, no masses, no signs of hernias, no evidence of hepatosplenomegaly  PELVIC:    External genitalia: normal without lesions or masses  Urethral meatus: no lesions or prolapse noted, normal size  Urethra: no masses, non tender  Bladder: non tender, no fullness  Vagina: normal mucosa and rugae, no discharge.  Cervix: normal without lesion, no cervical motion tenderness, healthy, multiparous  Uterus: small, mobile, nontender.  Adnexa: non tender, without masses  Rectal: deffered  Ext=no clubbing or cyanosis, no swelling      Procedure:  Endometrial biopsy    Indication: Menometrorrhagia with age >35                  Discussed risk of bleeding, infection, uterine perforation, cramping pain.  Pt agreed to proceed with procedure after all questions answered.    Speculum placed and cervix visualized.  Cervix cleansed with betadine x 3.  Tenaculum placed on anterior lip of the cervix.  Endometrial biopsy pipelle unable to be passed through cervix.  A cervical dilator was used and was still not able to be passed through the cervical os.  The tenaculum was removed from the cervix and sites hemostatic after application of silver nitrate.  No bleeding noted from cervical os.       Assessment  1.)  Menorrhagia  2.)  Cervical os stenosis  3.)  Stress urinary incontinence      Plan  1.)  Attempted endometrial biopsy  2.)  Schedule pelvic ultrasound  3.)   Follow-up to discuss treatment options      One undiagnosed new problem with uncertain prognosis and EMB attempted.  Nursing notes read and reviewed    Veronica Collins DO

## 2022-10-18 ENCOUNTER — ANCILLARY PROCEDURE (OUTPATIENT)
Dept: ULTRASOUND IMAGING | Facility: OTHER | Age: 42
End: 2022-10-18
Attending: OBSTETRICS & GYNECOLOGY
Payer: COMMERCIAL

## 2022-10-18 DIAGNOSIS — N92.1 MENOMETRORRHAGIA: ICD-10-CM

## 2022-10-18 PROCEDURE — 76830 TRANSVAGINAL US NON-OB: CPT | Mod: TC | Performed by: RADIOLOGY

## 2022-10-18 PROCEDURE — 76856 US EXAM PELVIC COMPLETE: CPT | Mod: TC | Performed by: RADIOLOGY

## 2022-10-24 ENCOUNTER — VIRTUAL VISIT (OUTPATIENT)
Dept: OBGYN | Facility: OTHER | Age: 42
End: 2022-10-24
Payer: COMMERCIAL

## 2022-10-24 ENCOUNTER — TELEPHONE (OUTPATIENT)
Dept: OBGYN | Facility: CLINIC | Age: 42
End: 2022-10-24

## 2022-10-24 DIAGNOSIS — N92.1 MENOMETRORRHAGIA: Primary | ICD-10-CM

## 2022-10-24 DIAGNOSIS — N88.2 CERVICAL OS STENOSIS: ICD-10-CM

## 2022-10-24 DIAGNOSIS — N39.3 STRESS INCONTINENCE OF URINE: ICD-10-CM

## 2022-10-24 PROCEDURE — 99213 OFFICE O/P EST LOW 20 MIN: CPT | Mod: 95 | Performed by: OBSTETRICS & GYNECOLOGY

## 2022-10-24 NOTE — TELEPHONE ENCOUNTER
[facility] SURGERY PLANNING/SCHEDULING WORKSHEET                                                     Edda Schmidt                :  1980  MRN:  [id number]  Home Phone [phone]   Work Phone Not on file.   Mobile [phone]         Surgeon: Veronica Collins DO    DIAGNOSIS:   Menometrorrhagia, cervical os stenosis, stress urinary incontinence    SURGICAL PROCEDURE: Total laparoscopic hysterectomy with bilateral salpingectomy    Surgery Location:  [facility]  Patient Surgery Class:  SDS  Length of Procedure:  120 minutes  Type of anesthesia:  General    Multi-surgeon case: Yes, Dr. Cochran  OR Assistant needed:   Yes  Vendor needed: No  Positioning:  Lithotomy  Laterality:  NA  Date requested:  When able    Special Equipment: Ligassure  Special Instructions for patient:  Not needed  Precautions:  NONE  :  NOT NEEDED    Sterilization consent:  Not yet, mailed to patient.    Preop: Pre-op options: PCP  Pre-surgery consult needed:  Not applicable.  Postop evaluation needed:  2 weeks    ALLERGIES: No Known Allergies   BMI:There is no height or weight on file to calculate BMI.     The proposed surgical procedure is considered INTERMEDIATE risk.      Veronica Collins DO    10/24/2022

## 2022-10-24 NOTE — PROGRESS NOTES
Janeen is a 42 year old who is being evaluated via a billable telephone visit.      What phone number would you like to be contacted at? [phone]  How would you like to obtain your AVS? Christen Saucedo  42 year old non-pregnant female presents today for a telephone visit to discuss a hysterectomy. I saw patient earlier this month for this.  Patient's menses have been coming closer together.  This has been getting worse for the last 6 months.  She has called sick into work due to the amount of bleeding.  Patient is using pads and she is changing them every hour.  She passes large clots.  She coughs and fills up a pad.  Her last menstrual period was 10/1.  She bleeds for 5 days and the majority of the time it is heavy bleeding.  Day 5 is lighter.  She does not use tampons because they fall out.  She does have dizziness and lightheadedness during her menses.  She has soiled her sheets at bedtime.  Some dysmenorrhea.  She has a headache before the menses starts.  She takes Tylenol and Motrin during this time regularly with minimal relief.  No problems urinating.  Normal bowel movements.  Patient is sexually active.  No dyspareunia.  No vaginal spotting after.  Female partner.  1  (7lb 7oz) and 1 c section for breech.  Patient has had a Mirena in the past and states insertion was very painful.  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  Paternal aunt with breast cancer.  No tobacco abuse.  We discussed possible causes for her menorrhagia.  We discussed medical versus surgical options in some detail.  Patient is wanting to proceed with a hysterectomy.  We discussed this in detail.  I discussed risks, benefits, and complications of surgery including but not limited to bleeding, infection, damage to nearby organs including but not limited to bladder, bowel, ureters, nerves, and blood vessels as well as anesthesia risks.  Injury may result at the time of surgery or in a  separate procedure.  We also discussed the possibility of a reoperation if the pathology came back abnormal.  All questions answered, and accepting these risks, the patient elects to proceed with the procedure.            I personally reviewed the ultrasound and the findings were benign except for a small uterine fibroid.      ROS: 10 point ROS neg other than the symptoms noted above in the HPI.  Past Medical History:   Diagnosis Date     Car sickness      Motion sickness      Past Surgical History:   Procedure Laterality Date     CYSTOSCOPY, SLING TRANSVAGINAL N/A 2/15/2022    Procedure: CREATION, VAGINAL SLING, WITH CYSTOSCOPY  (Support the urethra with mesh and look in the bladder);  Surgeon: Jeremy Sherman MD;  Location: [facility] OR     INJECT EPIDURAL LUMBAR Left 5/10/2019    Procedure: left sided Lumbar 4-5 translaminar injection;  Surgeon: Jordy Tanner MD;  Location: [facility]     History reviewed. No pertinent family history.  Social History     Tobacco Use     Smoking status: Never     Smokeless tobacco: Never   Substance Use Topics     Alcohol use: Yes         Objective  Vitals: LMP 10/01/2022   BMI= There is no height or weight on file to calculate BMI.      Pelvic ultrasound=10/18/2022:  FINDINGS:     UTERUS: 4.8 x 6.7 x 9.3 cm. Normal in size and position. Small  intramural fibroid in the anterior mid uterus, measuring 0.6 x 0.7 x  0.8 cm. Multiple nabothian cysts.     ENDOMETRIUM: 9 mm. Normal smooth endometrium.     RIGHT OVARY: 1.8 x 2.0 x 3.0 cm. Normal.     LEFT OVARY: 2.0 x 2.7 x 2.0 cm. Normal.     No significant free fluid.                                                                      IMPRESSION:  1.  Small uterine fibroid.  2.  Nabothian cysts in the cervix.  3.  Endometrial stripe measures 9 mm in thickness.  4.  Normal appearance of the ovaries.       Endometrial biopsy=unable to be done due to cervical os stenosis      Assessment  1.)  Menometrorrhagia  2.)  Cervical stenosis  3.)   Stress urinary frequency      Plan  1.)  Schedule TLH with bilateral salpingectomy with partner to assist  2.)  Schedule preop with PCP  3.)  Schedule COVID if needed      One undiagnosed new problem with uncertain prognosis and interpretation of ultrasound findings ordered by a different provider.  Nursing notes read and reviewed    Phone call: 7mins    Veronica Collins DO

## 2022-10-25 ENCOUNTER — TELEPHONE (OUTPATIENT)
Dept: OBGYN | Facility: CLINIC | Age: 42
End: 2022-10-25

## 2022-10-25 NOTE — TELEPHONE ENCOUNTER
[facility] SURGERY PLANNING/SCHEDULING WORKSHEET                                                     Edda Schmidt                :  1980  MRN:  [id number]  Home Phone [phone]   Work Phone Not on file.   Mobile [phone]         Surgeon: Veronica Collins DO     DIAGNOSIS:   Menometrorrhagia, cervical os stenosis, stress urinary incontinence     SURGICAL PROCEDURE: Total laparoscopic hysterectomy with bilateral salpingectomy     Surgery Location:  [facility]  Patient Surgery Class:  SDS  Length of Procedure:  120 minutes  Type of anesthesia:  General     Multi-surgeon case: Yes, Dr. Cochran  OR Assistant needed:   Yes  Vendor needed: No  Positioning:  Lithotomy  Laterality:  NA  Date requested:  When able     Special Equipment: Ligassure  Special Instructions for patient:  Not needed  Precautions:  NONE  :  NOT NEEDED     Sterilization consent:  Not yet, mailed to patient.     Preop: Pre-op options: PCP  Pre-surgery consult needed:  Not applicable.  Postop evaluation needed:  2 weeks     ALLERGIES: No Known Allergies   BMI:There is no height or weight on file to calculate BMI.      The proposed surgical procedure is considered INTERMEDIATE risk.        Veronica Collins DO    10/24/2022  SURGERY SCHEDULING AND PRECERTIFICATION    Medical Record Number: [id number]  Edda Medinakevin  YOB: 1980   Phone: [phone] (home)   Primary Provider: Rosetta Nicole    Reason for Admit:  ICD-10 CODE:  N92.1, N88.2, N39.3    Surgeon: Veronica Collins DO  Surgical Procedure: Total laparoscopic hysterectomy with bilateral salpingectomy     Date of Surgery  Time of Surgery 12pm  Surgery to be performed at:  [facility]  Status: Outpatient  Type of Anesthesia Anticipated: General    Sterilization consent:  Not yet, mailed to patient.    Pre-Op: On  with Dr Nicole at [facility] testing:  Patient is going to do the at-home rapid antigen  test 1-2 days before the procedure and bring the results with on the day of the procedure.   Post-Op: Patient to schedule 2 weeks after  with Dr Collins.    Pre-certification routed to [employer]:  Yes    Surgery packet mailed to patient's home address: Yes  Patient instructed NPO 12 hours prior to surgery, arrive 1.5 hours  prior to surgery, must have a .  Patient understood and agrees to the plan.      Requestor:  Kalee Hernández     Location:  [place] [id number]

## 2022-11-01 ENCOUNTER — MYC MEDICAL ADVICE (OUTPATIENT)
Dept: OBGYN | Facility: CLINIC | Age: 42
End: 2022-11-01

## 2022-11-11 ENCOUNTER — MYC MEDICAL ADVICE (OUTPATIENT)
Dept: OBGYN | Facility: OTHER | Age: 42
End: 2022-11-11

## 2022-11-22 ENCOUNTER — TELEPHONE (OUTPATIENT)
Dept: VASCULAR SURGERY | Facility: CLINIC | Age: 42
End: 2022-11-22

## 2022-11-22 ENCOUNTER — MYC MEDICAL ADVICE (OUTPATIENT)
Dept: FAMILY MEDICINE | Facility: OTHER | Age: 42
End: 2022-11-22

## 2022-11-22 ENCOUNTER — NURSE TRIAGE (OUTPATIENT)
Dept: FAMILY MEDICINE | Facility: OTHER | Age: 42
End: 2022-11-22

## 2022-11-22 DIAGNOSIS — I83.813 VARICOSE VEINS OF BOTH LOWER EXTREMITIES WITH PAIN: Primary | ICD-10-CM

## 2022-11-22 RX ORDER — CLONIDINE HYDROCHLORIDE 0.1 MG/1
TABLET ORAL
Qty: 2 TABLET | Refills: 0 | Status: SHIPPED | OUTPATIENT
Start: 2022-11-22 | End: 2022-12-29

## 2022-11-22 RX ORDER — ONDANSETRON 4 MG/1
TABLET, FILM COATED ORAL
Qty: 2 TABLET | Refills: 0 | Status: SHIPPED | OUTPATIENT
Start: 2022-11-22 | End: 2022-12-29

## 2022-11-22 RX ORDER — LORAZEPAM 1 MG/1
TABLET ORAL
Qty: 6 TABLET | Refills: 0 | Status: SHIPPED | OUTPATIENT
Start: 2022-11-22 | End: 2022-12-29

## 2022-11-22 NOTE — TELEPHONE ENCOUNTER
"Patient calling in regards to Exam18 message sent.     Lower back pain and shooting pain down left side of butt and leg. Weakness down leg. When she goes to use the leg, at times it wants to give out and that's when she gets the sharp pain.     Started hurting Thursday night. Has tried everything she normally does and it's not helping this time.     States this is the same issue as she's had for many years.     Pain is at 4-5/10 and when she gets the \"zingers\" it's at 7-8/10. Standing and walking are more painful. Sitting is more comfortable but when she sits and gets stiff then it hurts more.     States she had an MRI in 2018 or 2019 and has bulging discs. Had injection at that time and it helped.     Needs repeat MRI.     States steroid pack has helped in past. Requesting PCP to send in RX for steroid pack to help until she get get MRI.    Can PCP place order for MRI? Explained to patient she will need a visit. Or should patient see Ortho? Recommendations?     Scheduled patient to see Dr. Lee on 11/23. Or can PCP work in?     Advised if anything worsens then she can go into urgent care to be evaluated. Patient states understanding.     RACHEL Corona, RN  [place]  November 22, 2022      Reason for Disposition    MODERATE back pain (e.g., interferes with normal activities) and present > 3 days    Additional Information    Negative: Passed out (i.e., fainted, collapsed and was not responding)    Negative: Shock suspected (e.g., cold/pale/clammy skin, too weak to stand, low BP, rapid pulse)    Negative: Sounds like a life-threatening emergency to the triager    Negative: Major injury to the back (e.g., MVA, fall > 10 feet or 3 meters, penetrating injury, etc.)    Negative: Pain in the upper back over the ribs (rib cage) that radiates (travels) into the chest    Negative: Pain in the upper back over the ribs (rib cage) and worsened by coughing (or clearly increases with " breathing)    Negative: Back pain during pregnancy    Negative: SEVERE back pain of sudden onset and age > 60 years    Negative: SEVERE abdominal pain (e.g., excruciating)    Negative: Abdominal pain and age > 60 years    Negative: Unable to urinate (or only a few drops) and bladder feels very full    Negative: Loss of bladder or bowel control (urine or bowel incontinence; wetting self, leaking stool) of new-onset    Negative: Numbness (loss of sensation) in groin or rectal area    Negative: Pain radiates into groin, scrotum    Negative: Blood in urine (red, pink, or tea-colored)    Negative: Vomiting and pain over lower ribs of back (i.e., flank - kidney area)    Negative: Weakness of a leg or foot (e.g., unable to bear weight, dragging foot)    Negative: Patient sounds very sick or weak to the triager    Negative: Fever > 100.4 F (38.0 C) and flank pain    Negative: Pain or burning with passing urine (urination)    Negative: SEVERE back pain (e.g., excruciating, unable to do any normal activities) and not improved after pain medicine and CARE ADVICE    Negative: Numbness in an arm or hand (i.e., loss of sensation) and upper back pain    Negative: Numbness in a leg or foot (i.e., loss of sensation)    Negative: High-risk adult (e.g., history of cancer, history of HIV, or history of IV Drug Use)    Negative: Soft tissue infection (e.g., abscess, cellulitis) or other serious infection (e.g., bacteremia) in last 2 weeks    Negative: Painful rash with multiple small blisters grouped together (i.e., dermatomal distribution or 'band' or 'stripe')    Negative: Pain radiates into the thigh or further down the leg, and in both legs    Negative: Age > 50 and no history of prior similar back pain    Protocols used: BACK PAIN-A-[place]

## 2022-11-22 NOTE — TELEPHONE ENCOUNTER
11/22/2022    [facility] Preoperative Nurse Call    Procedure: Right leg VNUS closure GSV, ASV(med nec), 20-30 stab phlebs(med  nec)  Date: 11/30/2022  Surgeon: Dr. Roa  Time: 1300  Check in time: 1200    Called and spoke to patient. Informed patient: when to check in (1200) to sign consent, to bring their preop medications in their original bottle with them (3mg ativan, 0.1mg clonidine, 4mg zofran). Patient will take the medications after signing the consent to the procedure. Instructed patient to wear a mask, wear loose-fitting comfortable clothing, and bring their compression hose. Ensured patient has a /someone that will be responsible for them the rest of the day. Visitors are allowed in the clinic, but they would need to stay in an exam room or wait in the parking lot or leave. If  does leave, IF POSSIBLE, we ask that they do not go more than 15-20 mins from our clinic. Once procedure is completed, we will keep patient in recovery for 30-45 mins, and call  with aftercare instructions. Informed patient, that if possible, they should sit in the backseat to elevate their leg on the ride home.    Pt needs Thigh High compression hose for procedure. Status of the hose: patient has thigh high compression hose.    Special instructions: 2 sets of medications will be sent to patient pharmacy of choice. Instructed patient to take Zofran at 1130 with something to eat.      Patient Pre-op Questions:  Preferred Pharmacy: [facility] in [place].  Anticoagulant/ASA: no  Artificial Joint or Heart Valve: no  Open ulcer: no  Sedation nausea: yes    Special COVID-19 instructions: Patient aware they need to wear a mask to our clinic and during their procedure. Patient aware their  can come in with them, but would need to stay in an exam room during the procedure or wait in the parking lot or leave. Nurse will call patient's  when procedure is over.    Patient understands if they have any of the  following symptoms (fever, cough, shortness of breath, rash), they need to notify us immediately to cancel their procedure and will have to reschedule for a later date.    Patient is in agreement with all of the above and has no further questions at this time.    Yoli Rehman RN  [facility]

## 2022-11-23 ENCOUNTER — TELEPHONE (OUTPATIENT)
Dept: FAMILY MEDICINE | Facility: OTHER | Age: 42
End: 2022-11-23

## 2022-11-23 ENCOUNTER — HOSPITAL ENCOUNTER (OUTPATIENT)
Dept: MAMMOGRAPHY | Facility: CLINIC | Age: 42
Discharge: HOME | End: 2022-11-23
Admitting: FAMILY MEDICINE
Payer: COMMERCIAL

## 2022-11-23 ENCOUNTER — OFFICE VISIT (OUTPATIENT)
Dept: FAMILY MEDICINE | Facility: OTHER | Age: 42
End: 2022-11-23
Payer: COMMERCIAL

## 2022-11-23 VITALS
RESPIRATION RATE: 16 BRPM | HEIGHT: 65 IN | DIASTOLIC BLOOD PRESSURE: 72 MMHG | SYSTOLIC BLOOD PRESSURE: 118 MMHG | WEIGHT: 214 LBS | TEMPERATURE: 97 F | HEART RATE: 56 BPM | BODY MASS INDEX: 35.65 KG/M2 | OXYGEN SATURATION: 99 %

## 2022-11-23 DIAGNOSIS — M54.50 LUMBAR PAIN: Primary | ICD-10-CM

## 2022-11-23 DIAGNOSIS — M51.369 L4-L5 DISC BULGE: ICD-10-CM

## 2022-11-23 DIAGNOSIS — Z12.31 VISIT FOR SCREENING MAMMOGRAM: ICD-10-CM

## 2022-11-23 PROCEDURE — 99214 OFFICE O/P EST MOD 30 MIN: CPT | Performed by: STUDENT IN AN ORGANIZED HEALTH CARE EDUCATION/TRAINING PROGRAM

## 2022-11-23 PROCEDURE — 77067 SCR MAMMO BI INCL CAD: CPT

## 2022-11-23 ASSESSMENT — ASTHMA QUESTIONNAIRES
QUESTION_1 LAST FOUR WEEKS HOW MUCH OF THE TIME DID YOUR ASTHMA KEEP YOU FROM GETTING AS MUCH DONE AT WORK, SCHOOL OR AT HOME: NONE OF THE TIME
ACT_TOTALSCORE: 25
QUESTION_5 LAST FOUR WEEKS HOW WOULD YOU RATE YOUR ASTHMA CONTROL: COMPLETELY CONTROLLED
QUESTION_3 LAST FOUR WEEKS HOW OFTEN DID YOUR ASTHMA SYMPTOMS (WHEEZING, COUGHING, SHORTNESS OF BREATH, CHEST TIGHTNESS OR PAIN) WAKE YOU UP AT NIGHT OR EARLIER THAN USUAL IN THE MORNING: NOT AT ALL
QUESTION_4 LAST FOUR WEEKS HOW OFTEN HAVE YOU USED YOUR RESCUE INHALER OR NEBULIZER MEDICATION (SUCH AS ALBUTEROL): NOT AT ALL
QUESTION_2 LAST FOUR WEEKS HOW OFTEN HAVE YOU HAD SHORTNESS OF BREATH: NOT AT ALL
ACT_TOTALSCORE: 25

## 2022-11-23 ASSESSMENT — PAIN SCALES - GENERAL: PAINLEVEL: MODERATE PAIN (4)

## 2022-11-23 NOTE — PROGRESS NOTES
"  Assessment & Plan     Lumbar pain  L4-L5 disc bulge  Chronic low back pain, L4/L5 disc bulge from 2019 MRI.  Recent flareup 5 days ago.  Was seen in urgent care, on oral steroids.  No red flag signs/symptoms, no weakness.  Mainly pain of the left lower back with some radiation down left lower extremity.  Overall exam reassuring.  We will pursue physical therapy, home stretching, and topical and p.o. over-the-counter measures.  We did discuss about red flag signs/symptoms.  Could consider future MRI after a round of physical therapy.  She has tolerated the weekend low back injection in the past which we may consider..  - Physical Therapy Referral; Future  - diclofenac (VOLTAREN) 1 % topical gel; Apply 2 g topically 4 times daily      ZHANE PAN MD  [facility] VALDEMAR Vernon is a 42 year old, presenting for the following health issues:  Back Pain      History of Present Illness       Back Pain:  She presents for follow up of back pain. Patient's back pain is a recurring problem.  Location of back pain:  Right lower back, left lower back, left buttock, right hip and left hip  Description of back pain: burning, sharp and shooting  Back pain spreads: left buttocks, left thigh and left knee    Since patient first noticed back pain, pain is: always present, but gets better and worse  Does back pain interfere with her job:  Yes      She eats 2-3 servings of fruits and vegetables daily.She consumes 0 sweetened beverage(s) daily.She exercises with enough effort to increase her heart rate 20 to 29 minutes per day.  She exercises with enough effort to increase her heart rate 3 or less days per week.   She is taking medications regularly.         Review of Systems   Constitutional, HEENT, cardiovascular, pulmonary, gi and gu systems are negative, except as otherwise noted.      Objective    /72   Pulse 56   Temp 97  F (36.1  C) (Temporal)   Resp 16   Ht 1.661 m (5' 5.39\")   " Wt 97.1 kg (214 lb)   LMP 11/15/2022 (Exact Date)   SpO2 99%   BMI 35.18 kg/m    Body mass index is 35.18 kg/m .  Physical Exam  Vitals and nursing note reviewed.   Constitutional:       General: She is not in acute distress.     Appearance: Normal appearance. She is not ill-appearing, toxic-appearing or diaphoretic.   HENT:      Head: Normocephalic and atraumatic.      Right Ear: Tympanic membrane, ear canal and external ear normal. There is no impacted cerumen.      Left Ear: Tympanic membrane, ear canal and external ear normal. There is no impacted cerumen.      Nose: Nose normal. No congestion or rhinorrhea.      Mouth/Throat:      Mouth: Mucous membranes are moist.      Pharynx: Oropharynx is clear. No oropharyngeal exudate or posterior oropharyngeal erythema.   Eyes:      General:         Right eye: No discharge.         Left eye: No discharge.      Extraocular Movements: Extraocular movements intact.      Conjunctiva/sclera: Conjunctivae normal.      Pupils: Pupils are equal, round, and reactive to light.   Cardiovascular:      Rate and Rhythm: Normal rate and regular rhythm.      Heart sounds: No murmur heard.  Pulmonary:      Effort: Pulmonary effort is normal. No respiratory distress.      Breath sounds: Normal breath sounds.   Musculoskeletal:         General: Normal range of motion.      Cervical back: Normal range of motion.   Lymphadenopathy:      Cervical: No cervical adenopathy.   Neurological:      General: No focal deficit present.      Mental Status: She is alert and oriented to person, place, and time. Mental status is at baseline.      Sensory: No sensory deficit.      Motor: No weakness.      Comments: Symmetric patellar and Achilles reflexes  5+ strength symmetric bilateral lower extremities.  Straight leg raise positive right side   Psychiatric:         Mood and Affect: Mood normal.         Behavior: Behavior normal.         Thought Content: Thought content normal.

## 2022-11-24 NOTE — TELEPHONE ENCOUNTER
Prior Authorization Retail Medication Request    Medication/Dose: Diclofenac sodium 1% gel   ICD code (if different than what is on RX):    Previously Tried and Failed:    Rationale:  Prior authorization required by insurance     Insurance Name:  [facility] [place] [place]  Insurance ID:  [id number]      Pharmacy Information (if different than what is on RX)  Name:    Phone:

## 2022-11-27 ENCOUNTER — MYC MEDICAL ADVICE (OUTPATIENT)
Dept: FAMILY MEDICINE | Facility: OTHER | Age: 42
End: 2022-11-27

## 2022-11-28 DIAGNOSIS — M54.50 LUMBAR PAIN: ICD-10-CM

## 2022-11-28 DIAGNOSIS — M51.369 L4-L5 DISC BULGE: Primary | ICD-10-CM

## 2022-11-28 NOTE — TELEPHONE ENCOUNTER
Attempted to call patient to gather more information.  Rolls to voicemail.  Sent [employer] message to patient to return call.  Verenice Cuba RN

## 2022-11-28 NOTE — TELEPHONE ENCOUNTER
Central Prior Authorization Team   Phone: [phone]      PA Initiation    Medication: Diclofenac sodium 1% gel   Insurance Company: [employer] - Phone [phone] Fax [phone]  Pharmacy Filling the Rx: [address]   Filling Pharmacy Phone: [phone]  Filling Pharmacy Fax:    Start Date: 11/28/2022

## 2022-11-30 ENCOUNTER — OFFICE VISIT (OUTPATIENT)
Dept: VASCULAR SURGERY | Facility: CLINIC | Age: 42
End: 2022-11-30
Payer: COMMERCIAL

## 2022-11-30 VITALS — OXYGEN SATURATION: 95 % | DIASTOLIC BLOOD PRESSURE: 63 MMHG | SYSTOLIC BLOOD PRESSURE: 106 MMHG | HEART RATE: 64 BPM

## 2022-11-30 DIAGNOSIS — I83.893 VARICOSE VEINS OF BOTH LEGS WITH EDEMA: ICD-10-CM

## 2022-11-30 DIAGNOSIS — G89.18 ACUTE POST-OPERATIVE PAIN: ICD-10-CM

## 2022-11-30 DIAGNOSIS — I83.813 VARICOSE VEINS OF BOTH LOWER EXTREMITIES WITH PAIN: Primary | ICD-10-CM

## 2022-11-30 PROCEDURE — 37766 PHLEB VEINS - EXTREM 20+: CPT | Mod: RT | Performed by: SPECIALIST

## 2022-11-30 PROCEDURE — 36475 ENDOVENOUS RF 1ST VEIN: CPT | Mod: RT | Performed by: SPECIALIST

## 2022-11-30 PROCEDURE — 36476 ENDOVENOUS RF VEIN ADD-ON: CPT | Mod: RT | Performed by: SPECIALIST

## 2022-11-30 RX ORDER — HYDROCODONE BITARTRATE AND ACETAMINOPHEN 5; 325 MG/1; MG/1
1 TABLET ORAL EVERY 6 HOURS PRN
Qty: 8 TABLET | Refills: 0 | Status: SHIPPED | OUTPATIENT
Start: 2022-11-30 | End: 2022-12-03

## 2022-11-30 NOTE — PROGRESS NOTES
[facility] Procedure Note    Edda Schmidt  November 30, 2022    Edda Schmidt is a 42 year old year old female. She presents for Vein Procedure  .    /63   Pulse 64   LMP 11/15/2022 (Exact Date)   SpO2 95%     Flowsheet Data 11/30/2022   Procedure Start Time: 12:59 PM   Prep: Chloraprep   Side: Right   Tx Length (cm): RIGHT GSV: 62.5   Junction (cm): RIGHT GSV: 2.63   RF Cycles: RIGHT GSV: 15   RF TX Time (Minutes): RIGHT GSV: 5:00   How many additional vein treatments are being performed? 1   Tx Length (cm) - 2: RIGHT ASV: 20   Junction 2 (cm): RIGHT ASV: 2.12   RF Cycles 2 : RIGHT ASV: 6   RF TX Time (Minutes) 2: RIGHT ASV: 2:00   # PHLEB Sites: 49   Sedation taken: Yes   Pre Pt. Physical / Cognitive Limitations: WNL   TOTAL Local anesthesia Injected (ml): 10   Max Volume Local Anesthesia (ml): 11   TOTAL Tumescent Injected volume (ml): 572   Max Volume Tumescent (ml): 572   Post Pt. Physical / Cognitive Limitations: WNL   Procedure End Time:  2:35 PM   D/C Instructions given, states readiness to leave and escorted to car: Yes       Venus Closure    Date/Time: 11/30/2022 2:43 PM  Performed by: Neal Roa MD  Authorized by: Neal Roa MD     Time out: Immediately prior to the procedure a time out was called    Preparation: Patient was prepped and draped in usual sterile fashion    1st Assist: Fatoumata Barnett CST/MILVIA    Circulator: Yoli Rehman RN    Procedure:  VNUS  Procedure side:  Right  Vein Treated:  GSV  Patient tolerance:  Patient tolerated the procedure well with no immediate complications  Wrap/Hose:  Wraps  Dyan Closure    Date/Time: 11/30/2022 2:44 PM  Performed by: Neal Roa MD  Authorized by: Neal Roa MD     Time out: Immediately prior to the procedure a time out was called    Preparation: Patient was prepped and draped in usual sterile fashion    1st Assist: Fatoumata Barnett CST/MILVIA    Circulator: Yoli Rehman RN    Procedure:   VNUS  Procedure side:  Right  Second and Subsequent Vein    Vein Treated:  ASV  Patient tolerance:  Patient tolerated the procedure well with no immediate complications  Wrap/Hose:  Wraps  Phlebectomy    Date/Time: 11/30/2022 2:44 PM  Performed by: Neal Roa MD  Authorized by: Neal Roa MD     Time out: Immediately prior to the procedure a time out was called    Preparation: Patient was prepped and draped in usual sterile fashion    1st Assist: Fatoumata Barnett CST/CSFA    Circulator: Yoli Rehman RN    Procedure:  Phlebectomies  Type:  Medically Necessary  Procedure side:  Right  Stabs:  >20  Patient tolerance:  Patient tolerated the procedure well with no immediate complications  Wrap/Hose:  Wraps        Details procedure:  With the cooperation of the patient in the preop holding of the right lower extremity was marked as well as areas for stab phlebectomy.  She was taken to the procedure room and the right lower extremity was prepped and draped in sterile fashion.  A timeout was performed confirm the date and the patient also procedure be performed.  We initially mapped out the greater saphenous vein and anterior sensory saphenous veins.  An access point for the greater saphenous vein was chosen in the mid calf.  It was accessed with a micropuncture needle a 7 [place] sheath we also accessed the anterior accessory saphenous vein in the distal thigh with a micropuncture needle and a guidewire.  The catheter was then passed up the greater saphenous vein and a 014 wire was to help to guide it through an area of tortuosity.  It was then positioned 2.63 cm from the saphenofemoral junction.  Tumescent solution was placed around the vein and radiofrequency ablation was carried out in 7 cm segments.  We then turned our attention to the anterior accessory saphenous vein and a 7 [place] sheath was then passed over the wire.  The catheter was then positioned 2.12 cm from its junction with the GSV.   Tumescent solution was placed around the vein and radiofrequency ablation was carried out in 7 cm segments.  The areas previously marked for stab phlebectomy were infiltrated local anesthetic.  After adequate analgesia was achieved multiple stabs were made using ophthalmic blade and the veins removed with a combination of jonnie hooks and mosquito clamps.  This was done for total 54 stabs.  Sterile dressings were applied and the patient was taken from the procedure and back to holding her in stable condition to be sent home.    Neal Roa MD, FACS

## 2022-11-30 NOTE — LETTER
11/30/2022         RE: Edda Schmidt  [address] [facility] [address]        Dear Colleague,    Thank you for referring your patient, Edda Schmidt, to the [facility] [place]. Please see a copy of my visit note below.    Pre-procedure Nursing Note    Edda Schmidt presents to clinic for Vein Procedure  .   /Person Responsible for Patient: Lakisha (Partner)  Phone Number: [phone]    Prophylactic Medication:Anti-Nausea, Zofran 4mg ,   Time Taken: 1140   Sedation Medication: Ativan, 3mg ,   Time Taken: 1202 and Clonidine, 0.1mg,   Time Taken: 1202  Compression Stockings: Patient left hose at home.  The procedure is being performed on RLE.  Patient understanding of procedure matches consent? YES    Patient's pre-procedure medications verified by AF.    Yoli Rehman RN on 11/30/2022 at 12:00 PM        [facility] Procedure Note    Edda Schmidt  November 30, 2022    Edda Schmidt is a 42 year old year old female. She presents for Vein Procedure  .    /63   Pulse 64   LMP 11/15/2022 (Exact Date)   SpO2 95%     Flowsheet Data 11/30/2022   Procedure Start Time: 12:59 PM   Prep: Chloraprep   Side: Right   Tx Length (cm): RIGHT GSV: 62.5   Junction (cm): RIGHT GSV: 2.63   RF Cycles: RIGHT GSV: 15   RF TX Time (Minutes): RIGHT GSV: 5:00   How many additional vein treatments are being performed? 1   Tx Length (cm) - 2: RIGHT ASV: 20   Junction 2 (cm): RIGHT ASV: 2.12   RF Cycles 2 : RIGHT ASV: 6   RF TX Time (Minutes) 2: RIGHT ASV: 2:00   # PHLEB Sites: 49   Sedation taken: Yes   Pre Pt. Physical / Cognitive Limitations: WNL   TOTAL Local anesthesia Injected (ml): 10   Max Volume Local Anesthesia (ml): 11   TOTAL Tumescent Injected volume (ml): 572   Max Volume Tumescent (ml): 572   Post Pt. Physical / Cognitive Limitations: WNL   Procedure End Time:  2:35 PM   D/C Instructions given, states readiness to leave and escorted to car: Yes       Venus  Closure    Date/Time: 11/30/2022 2:43 PM  Performed by: Neal Roa MD  Authorized by: Neal Roa MD     Time out: Immediately prior to the procedure a time out was called    Preparation: Patient was prepped and draped in usual sterile fashion    1st Assist: Fatoumata Barnett CST/MILVIA    Circulator: Yoli Rehman RN    Procedure:  VNUS  Procedure side:  Right  Vein Treated:  GSV  Patient tolerance:  Patient tolerated the procedure well with no immediate complications  Wrap/Hose:  Wraps  Britt Closure    Date/Time: 11/30/2022 2:44 PM  Performed by: Neal Roa MD  Authorized by: Neal Roa MD     Time out: Immediately prior to the procedure a time out was called    Preparation: Patient was prepped and draped in usual sterile fashion    1st Assist: Fatoumata Barnett CST/MILVIA    Circulator: Yoli Rehman RN    Procedure:  VNUS  Procedure side:  Right  Second and Subsequent Vein    Vein Treated:  ASV  Patient tolerance:  Patient tolerated the procedure well with no immediate complications  Wrap/Hose:  Wraps  Phlebectomy    Date/Time: 11/30/2022 2:44 PM  Performed by: Neal Roa MD  Authorized by: Neal Roa MD     Time out: Immediately prior to the procedure a time out was called    Preparation: Patient was prepped and draped in usual sterile fashion    1st Assist: Fatoumata Barnett CST/MILVIA    Circulator: Yoli Rehman RN    Procedure:  Phlebectomies  Type:  Medically Necessary  Procedure side:  Right  Stabs:  >20  Patient tolerance:  Patient tolerated the procedure well with no immediate complications  Wrap/Hose:  Wraps        Details procedure:  With the cooperation of the patient in the preop holding of the right lower extremity was marked as well as areas for stab phlebectomy.  She was taken to the procedure room and the right lower extremity was prepped and draped in sterile fashion.  A timeout was performed confirm the date and the patient also procedure be performed.   We initially mapped out the greater saphenous vein and anterior sensory saphenous veins.  An access point for the greater saphenous vein was chosen in the mid calf.  It was accessed with a micropuncture needle a 7 Slovenian sheath we also accessed the anterior accessory saphenous vein in the distal thigh with a micropuncture needle and a guidewire.  The catheter was then passed up the greater saphenous vein and a 014 wire was to help to guide it through an area of tortuosity.  It was then positioned 2.63 cm from the saphenofemoral junction.  Tumescent solution was placed around the vein and radiofrequency ablation was carried out in 7 cm segments.  We then turned our attention to the anterior accessory saphenous vein and a 7 Slovenian sheath was then passed over the wire.  The catheter was then positioned 2.12 cm from its junction with the GSV.  Tumescent solution was placed around the vein and radiofrequency ablation was carried out in 7 cm segments.  The areas previously marked for stab phlebectomy were infiltrated local anesthetic.  After adequate analgesia was achieved multiple stabs were made using ophthalmic blade and the veins removed with a combination of jonnie hooks and mosquito clamps.  This was done for total 54 stabs.  Sterile dressings were applied and the patient was taken from the procedure and back to holding her in stable condition to be sent home.    Neal Roa MD, FACS            Again, thank you for allowing me to participate in the care of your patient.        Sincerely,        Neal Roa MD

## 2022-11-30 NOTE — PROGRESS NOTES
Pre-procedure Nursing Note    Edda Schmidt presents to clinic for Vein Procedure  .   /Person Responsible for Patient: Lakisha (Partner)  Phone Number: [phone]    Prophylactic Medication:Anti-Nausea, Zofran 4mg ,   Time Taken: 1140   Sedation Medication: Ativan, 3mg ,   Time Taken: 1202 and Clonidine, 0.1mg,   Time Taken: 1202  Compression Stockings: Patient left hose at home.  The procedure is being performed on RLE.  Patient understanding of procedure matches consent? YES    Patient's pre-procedure medications verified by AF.    Yoli Rehman RN on 11/30/2022 at 12:00 PM

## 2022-12-02 ENCOUNTER — OFFICE VISIT (OUTPATIENT)
Dept: VASCULAR SURGERY | Facility: CLINIC | Age: 42
End: 2022-12-02
Attending: SPECIALIST
Payer: COMMERCIAL

## 2022-12-02 ENCOUNTER — ANCILLARY PROCEDURE (OUTPATIENT)
Dept: ULTRASOUND IMAGING | Facility: CLINIC | Age: 42
End: 2022-12-02
Attending: SPECIALIST
Payer: COMMERCIAL

## 2022-12-02 ENCOUNTER — HOSPITAL ENCOUNTER (OUTPATIENT)
Dept: MRI IMAGING | Facility: CLINIC | Age: 42
Discharge: HOME | End: 2022-12-02
Attending: STUDENT IN AN ORGANIZED HEALTH CARE EDUCATION/TRAINING PROGRAM | Admitting: STUDENT IN AN ORGANIZED HEALTH CARE EDUCATION/TRAINING PROGRAM
Payer: COMMERCIAL

## 2022-12-02 DIAGNOSIS — M54.50 LUMBAR PAIN: ICD-10-CM

## 2022-12-02 DIAGNOSIS — Z09 POSTOP CHECK: Primary | ICD-10-CM

## 2022-12-02 DIAGNOSIS — M51.369 L4-L5 DISC BULGE: ICD-10-CM

## 2022-12-02 DIAGNOSIS — I83.813 VARICOSE VEINS OF BOTH LOWER EXTREMITIES WITH PAIN: ICD-10-CM

## 2022-12-02 PROCEDURE — 93971 EXTREMITY STUDY: CPT | Mod: RT | Performed by: SPECIALIST

## 2022-12-02 PROCEDURE — 72148 MRI LUMBAR SPINE W/O DYE: CPT

## 2022-12-02 PROCEDURE — 99207 PR NO CHARGE NURSE ONLY: CPT

## 2022-12-02 NOTE — LETTER
12/2/2022         RE: Edda Schmidt  [address] [facility] [address]        Dear Colleague,    Thank you for referring your patient, Edda Schmidt, to the [facility] [place]. Please see a copy of my visit note below.      December 2, 2022    Vein Clinic Postoperative Nurse Note    Patient is here for her 48 hour postoperative visit.    Procedure: Right leg VNUS closure GSV, ASV(med nec), 20-30 stab phlebs(med nec)   Procedure Date: 11/30/2022  Surgeon: Dr. Roa    Ultrasound Result: Right lower extremity negative for DVT. Right GSV is closed 10mm from SFJ to mid calf. Acute occlusive thrombus extending into varicose vein off GSV knee. Right AASV is closed 11mm from SFJ to proximal thigh. Acute occlusive thrombus extending into varicose vein off proximal/mid thigh.     Physical Exam: Incisions are approximated without signs of infection.  Ecchymosis: right medial thigh has large reddish purple bruise and medial knee and anterior calf has moderate bruising. Pt states they are painful  Swelling: no swelling noted  Paresthesia: pt states right medial thigh, medial near knee and anterior calf have numbness    Patient Questions or Concerns: assisted patient with donning thigh high compression hose.    Reviewed postoperative instructions with patient and provided her with written material of common things to expect from her procedure.    Patient's Next Vein Clinic Appointment: 2nd procedure scheduled for 12/27/22.     Daniel Blas RN  [facility]      Again, thank you for allowing me to participate in the care of your patient.        Sincerely,        No name on file     ,

## 2022-12-02 NOTE — PROGRESS NOTES
December 2, 2022    Vein Clinic Postoperative Nurse Note    Patient is here for her 48 hour postoperative visit.    Procedure: Right leg VNUS closure GSV, ASV(med nec), 20-30 stab phlebs(med nec)   Procedure Date: 11/30/2022  Surgeon: Dr. Roa    Ultrasound Result: Right lower extremity negative for DVT. Right GSV is closed 10mm from SFJ to mid calf. Acute occlusive thrombus extending into varicose vein off GSV knee. Right AASV is closed 11mm from SFJ to proximal thigh. Acute occlusive thrombus extending into varicose vein off proximal/mid thigh.     Physical Exam: Incisions are approximated without signs of infection.  Ecchymosis: right medial thigh has large reddish purple bruise and medial knee and anterior calf has moderate bruising. Pt states they are painful  Swelling: no swelling noted  Paresthesia: pt states right medial thigh, medial near knee and anterior calf have numbness    Patient Questions or Concerns: assisted patient with donning thigh high compression hose.    Reviewed postoperative instructions with patient and provided her with written material of common things to expect from her procedure.    Patient's Next Vein Clinic Appointment: 2nd procedure scheduled for 12/27/22.     Daniel Blas RN  [facility]

## 2022-12-05 ENCOUNTER — MYC MEDICAL ADVICE (OUTPATIENT)
Dept: FAMILY MEDICINE | Facility: OTHER | Age: 42
End: 2022-12-05

## 2022-12-05 DIAGNOSIS — M51.369 L4-L5 DISC BULGE: Primary | ICD-10-CM

## 2022-12-05 DIAGNOSIS — E27.9 ADRENAL NODULE (H): Primary | ICD-10-CM

## 2022-12-05 DIAGNOSIS — M54.50 LUMBAR PAIN: ICD-10-CM

## 2022-12-05 NOTE — RESULT ENCOUNTER NOTE
Edda,    Here are your most recent results from your clinic visit.    MRI shows similar results of your lower back compared to the previous MRI. Slight worsening arthritis. Continue with physical therapy, stretches, and symptom management for pain    A nodule was incidentally seen on your adrenal glad, unsure of any details of this. It is recommended to get a repeat imaging to take a closer look at it. I ordered a CT scan to look into this further    If you have any questions feel free to call the clinic at [phone].    Thank you,    Prabhjot Lee MD

## 2022-12-05 NOTE — TELEPHONE ENCOUNTER
Dr. Nicole, are you wanting to have her schedule an appointment and if so, are you able to add her on for a virtual or in office appointment?  Verenice JUAN RN

## 2022-12-07 ENCOUNTER — ANCILLARY PROCEDURE (OUTPATIENT)
Dept: MAMMOGRAPHY | Facility: CLINIC | Age: 42
End: 2022-12-07
Attending: FAMILY MEDICINE
Payer: COMMERCIAL

## 2022-12-07 ENCOUNTER — ANCILLARY PROCEDURE (OUTPATIENT)
Dept: ULTRASOUND IMAGING | Facility: CLINIC | Age: 42
End: 2022-12-07
Attending: FAMILY MEDICINE
Payer: COMMERCIAL

## 2022-12-07 DIAGNOSIS — R92.8 ABNORMAL MAMMOGRAM: ICD-10-CM

## 2022-12-07 PROCEDURE — 77065 DX MAMMO INCL CAD UNI: CPT | Mod: RT | Performed by: STUDENT IN AN ORGANIZED HEALTH CARE EDUCATION/TRAINING PROGRAM

## 2022-12-07 PROCEDURE — G0279 TOMOSYNTHESIS, MAMMO: HCPCS | Performed by: STUDENT IN AN ORGANIZED HEALTH CARE EDUCATION/TRAINING PROGRAM

## 2022-12-07 PROCEDURE — 76642 ULTRASOUND BREAST LIMITED: CPT | Mod: RT | Performed by: STUDENT IN AN ORGANIZED HEALTH CARE EDUCATION/TRAINING PROGRAM

## 2022-12-08 ENCOUNTER — HOSPITAL ENCOUNTER (OUTPATIENT)
Dept: CT IMAGING | Facility: CLINIC | Age: 42
Discharge: HOME | End: 2022-12-08
Attending: STUDENT IN AN ORGANIZED HEALTH CARE EDUCATION/TRAINING PROGRAM | Admitting: STUDENT IN AN ORGANIZED HEALTH CARE EDUCATION/TRAINING PROGRAM
Payer: COMMERCIAL

## 2022-12-08 DIAGNOSIS — E27.9 ADRENAL NODULE (H): ICD-10-CM

## 2022-12-08 PROCEDURE — 74170 CT ABD WO CNTRST FLWD CNTRST: CPT

## 2022-12-08 PROCEDURE — 250N000009 HC RX 250: Performed by: STUDENT IN AN ORGANIZED HEALTH CARE EDUCATION/TRAINING PROGRAM

## 2022-12-08 PROCEDURE — 250N000011 HC RX IP 250 OP 636: Performed by: STUDENT IN AN ORGANIZED HEALTH CARE EDUCATION/TRAINING PROGRAM

## 2022-12-08 RX ORDER — IOPAMIDOL 755 MG/ML
500 INJECTION, SOLUTION INTRAVASCULAR ONCE
Status: COMPLETED | OUTPATIENT
Start: 2022-12-08 | End: 2022-12-08

## 2022-12-08 RX ADMIN — IOPAMIDOL 100 ML: 755 INJECTION, SOLUTION INTRAVENOUS at 14:40

## 2022-12-08 RX ADMIN — SODIUM CHLORIDE 60 ML: 9 INJECTION, SOLUTION INTRAVENOUS at 14:40

## 2022-12-27 ENCOUNTER — OFFICE VISIT (OUTPATIENT)
Dept: VASCULAR SURGERY | Facility: CLINIC | Age: 42
End: 2022-12-27
Payer: COMMERCIAL

## 2022-12-27 ENCOUNTER — TRANSFERRED RECORDS (OUTPATIENT)
Dept: HEALTH INFORMATION MANAGEMENT | Facility: CLINIC | Age: 42
End: 2022-12-27

## 2022-12-27 VITALS — SYSTOLIC BLOOD PRESSURE: 125 MMHG | HEART RATE: 54 BPM | DIASTOLIC BLOOD PRESSURE: 75 MMHG | OXYGEN SATURATION: 97 %

## 2022-12-27 DIAGNOSIS — G89.18 ACUTE POST-OPERATIVE PAIN: ICD-10-CM

## 2022-12-27 DIAGNOSIS — I83.813 VARICOSE VEINS OF BOTH LOWER EXTREMITIES WITH PAIN: Primary | ICD-10-CM

## 2022-12-27 DIAGNOSIS — I83.893 VARICOSE VEINS OF BOTH LEGS WITH EDEMA: ICD-10-CM

## 2022-12-27 PROCEDURE — 37766 PHLEB VEINS - EXTREM 20+: CPT | Mod: 79 | Performed by: SPECIALIST

## 2022-12-27 PROCEDURE — 36475 ENDOVENOUS RF 1ST VEIN: CPT | Mod: 79 | Performed by: SPECIALIST

## 2022-12-27 PROCEDURE — 36476 ENDOVENOUS RF VEIN ADD-ON: CPT | Mod: 79 | Performed by: SPECIALIST

## 2022-12-27 RX ORDER — HYDROCODONE BITARTRATE AND ACETAMINOPHEN 5; 325 MG/1; MG/1
1 TABLET ORAL EVERY 6 HOURS PRN
Qty: 6 TABLET | Refills: 0 | Status: SHIPPED | OUTPATIENT
Start: 2022-12-27 | End: 2022-12-29

## 2022-12-27 NOTE — PROGRESS NOTES
Pre-procedure Nursing Note    Edda Schmidt presents to clinic for Vein Procedure  .   /Person Responsible for Patient: MARIELA (Friend)  Phone Number: HERE    Prophylactic Medication:Anti-Nausea, 4MG,   Time Taken: 7:45AM   Sedation Medication: Ativan, 3MG ,   Time Taken: 8:35AM and Clonidine, 0.1 MG,   Time Taken: 8:35AM  Compression Stockings: Patient brought with today.  The procedure is being performed on E.  Patient understanding of procedure matches consent? YES    Patient's pre-procedure medications verified by NL.    Anny Akins MA on 12/27/2022 at 8:38 AM

## 2022-12-27 NOTE — PROGRESS NOTES
[facility] Procedure Note    Edda Schmidt  December 27, 2022    Edda Schmidt is a 42 year old year old female. She presents for Vein Procedure  .    /75   Pulse 54   LMP 11/15/2022 (Exact Date)   SpO2 97%     Flowsheet Data 12/27/2022   Procedure Start Time:  9:42 AM   Prep: Chloraprep   Side: Left   Tx Length (cm): LEFT GSV: 62   Junction (cm): LEFT GSV: 2.27   RF Cycles: LEFT GSV: 18   RF TX Time (Minutes): LEFT GSV: 5:30   How many additional vein treatments are being performed? 1   Tx Length (cm) - 2: LEFT ASV: 18   Junction 2 (cm): LEFT ASV: 2.03 FROM GSV   RF Cycles 2 : LEFT ASV: 4   RF TX Time (Minutes) 2: LEFT ASV: 1:20   # PHLEB Sites: 30   Sedation taken: Yes   Pre Pt. Physical / Cognitive Limitations: WNL   TOTAL Local anesthesia Injected (ml): 5   Max Volume Local Anesthesia (ml): 11   TOTAL Tumescent Injected volume (ml): 572   Max Volume Tumescent (ml): 572   Post Pt. Physical / Cognitive Limitations: WNL   Procedure End Time: 11:12 AM   D/C Instructions given, states readiness to leave and escorted to car: Yes       Venus Closure    Date/Time: 12/27/2022 11:22 AM  Performed by: Neal Roa MD  Authorized by: Neal Roa MD     Time out: Immediately prior to the procedure a time out was called    Preparation: Patient was prepped and draped in usual sterile fashion    1st Assist: LEXY Cee    Circulator: Yoli Rehman RN    Procedure:  VNUS  Procedure side:  Left  Vein Treated:  GSV  Patient tolerance:  Patient tolerated the procedure well with no immediate complications  Wrap/Hose:  Wraps  [place] Closure    Date/Time: 12/27/2022 11:22 AM  Performed by: Neal Roa MD  Authorized by: Neal Roa MD     Time out: Immediately prior to the procedure a time out was called    Preparation: Patient was prepped and draped in usual sterile fashion    1st Assist: LEXY Cee    Circulator: Yoli Rehman RN    Procedure:   VNUS  Procedure side:  Left  Second and Subsequent Vein    Vein Treated:  ASV  Patient tolerance:  Patient tolerated the procedure well with no immediate complications  Wrap/Hose:  Wraps  Phlebectomy    Date/Time: 12/27/2022 11:23 AM  Performed by: Neal Roa MD  Authorized by: Neal Roa MD     Time out: Immediately prior to the procedure a time out was called    Preparation: Patient was prepped and draped in usual sterile fashion    1st Assist: Poonam Cruz CST/MILVIA    Circulator: Yoli Rehman RN    Procedure:  Phlebectomies  Type:  Medically Necessary  Procedure side:  Left  Stabs:  >20  Patient tolerance:  Patient tolerated the procedure well with no immediate complications  Wrap/Hose:  Wraps      Details procedure:  With the cooperation the patient.  Holding the left lower extremities marked as well as areas for stab phlebectomy.  She was taken the procedure and left lower extremities prepped and draped sterile fashion.  A timeout was performed confirm the identity patient also procedure performed.  The greater saphenous vein was then mapped over its entire length as well as the anterior excessively saphenous vein.  We initially chose to access the GSV which was done in the mid calf.  The vein was accessed with a micropuncture needle and a 7 Slovak sheath.  We then also accessed the anterior accessory saphenous vein and left the wire in place.  The catheter was then passed up the GSV and positioned 2.27 cm in the saphenofemoral junction.  Radiofrequency ablation was then carried out in 2 cm segments.  We then introduced the sheath over the wire into the anterior accessory saphenous vein.  The catheter was passed up the vein and positioned 2.03 cm from the GSV.  Tumescent solution was placed around the vein and radiofrequency ablation was carried out 7 cm segments.  We then turned our attention to the areas for stab phlebectomy.  They are all infiltrated local anesthetic.  Multiple stabs  were made using ophthalmic blade and the veins were removed using a combination of jonnie hook mosquito clamps.  This was done for total of 30 stabs.  Sterile dressings were applied and the patient taken from the procedure room back to holding her in stable condition to be sent home.      Neal Roa MD, FACS

## 2022-12-27 NOTE — LETTER
12/27/2022         RE: Edda Schmidt  [address] [facility] [address]        Dear Colleague,    Thank you for referring your patient, Edda Schmidt, to the [facility]IN [facility] [place]. Please see a copy of my visit note below.    Pre-procedure Nursing Note    Edda Schmidt presents to clinic for Vein Procedure  .   /Person Responsible for Patient: MARIELA (Friend)  Phone Number: HERE    Prophylactic Medication:Anti-Nausea, 4MG,   Time Taken: 7:45AM   Sedation Medication: Ativan, 3MG ,   Time Taken: 8:35AM and Clonidine, 0.1 MG,   Time Taken: 8:35AM  Compression Stockings: Patient brought with today.  The procedure is being performed on LLE.  Patient understanding of procedure matches consent? YES    Patient's pre-procedure medications verified by NL.    Anny Akins MA on 12/27/2022 at 8:38 AM        [facility] Procedure Note    Edda Schmidt  December 27, 2022    Edda Schmidt is a 42 year old year old female. She presents for Vein Procedure  .    /75   Pulse 54   LMP 11/15/2022 (Exact Date)   SpO2 97%     Flowsheet Data 12/27/2022   Procedure Start Time:  9:42 AM   Prep: Chloraprep   Side: Left   Tx Length (cm): LEFT GSV: 62   Junction (cm): LEFT GSV: 2.27   RF Cycles: LEFT GSV: 18   RF TX Time (Minutes): LEFT GSV: 5:30   How many additional vein treatments are being performed? 1   Tx Length (cm) - 2: LEFT ASV: 18   Junction 2 (cm): LEFT ASV: 2.03 FROM GSV   RF Cycles 2 : LEFT ASV: 4   RF TX Time (Minutes) 2: LEFT ASV: 1:20   # PHLEB Sites: 30   Sedation taken: Yes   Pre Pt. Physical / Cognitive Limitations: WNL   TOTAL Local anesthesia Injected (ml): 5   Max Volume Local Anesthesia (ml): 11   TOTAL Tumescent Injected volume (ml): 572   Max Volume Tumescent (ml): 572   Post Pt. Physical / Cognitive Limitations: WNL   Procedure End Time: 11:12 AM   D/C Instructions given, states readiness to leave and escorted to car: Yes       Venus Closure    Date/Time:  12/27/2022 11:22 AM  Performed by: Neal Roa MD  Authorized by: Neal Roa MD     Time out: Immediately prior to the procedure a time out was called    Preparation: Patient was prepped and draped in usual sterile fashion    1st Assist: LEXY Cee    Circulator: Yoli Rehman RN    Procedure:  VNUS  Procedure side:  Left  Vein Treated:  GSV  Patient tolerance:  Patient tolerated the procedure well with no immediate complications  Wrap/Hose:  Wraps  Dyan Closure    Date/Time: 12/27/2022 11:22 AM  Performed by: Neal Roa MD  Authorized by: Neal Roa MD     Time out: Immediately prior to the procedure a time out was called    Preparation: Patient was prepped and draped in usual sterile fashion    1st Assist: LEXY Cee    Circulator: Yoli Rehman RN    Procedure:  VNUS  Procedure side:  Left  Second and Subsequent Vein    Vein Treated:  ASV  Patient tolerance:  Patient tolerated the procedure well with no immediate complications  Wrap/Hose:  Wraps  Phlebectomy    Date/Time: 12/27/2022 11:23 AM  Performed by: Neal Roa MD  Authorized by: Neal Roa MD     Time out: Immediately prior to the procedure a time out was called    Preparation: Patient was prepped and draped in usual sterile fashion    1st Assist: LEXY Cee    Circulator: Yoli Rehman RN    Procedure:  Phlebectomies  Type:  Medically Necessary  Procedure side:  Left  Stabs:  >20  Patient tolerance:  Patient tolerated the procedure well with no immediate complications  Wrap/Hose:  Wraps      Details procedure:  With the cooperation the patient.  Holding the left lower extremities marked as well as areas for stab phlebectomy.  She was taken the procedure and left lower extremities prepped and draped sterile fashion.  A timeout was performed confirm the identity patient also procedure performed.  The greater saphenous vein was then mapped over its entire length as  well as the anterior excessively saphenous vein.  We initially chose to access the GSV which was done in the mid calf.  The vein was accessed with a micropuncture needle and a 7 Citizen of [place] sheath.  We then also accessed the anterior accessory saphenous vein and left the wire in place.  The catheter was then passed up the GSV and positioned 2.27 cm in the saphenofemoral junction.  Radiofrequency ablation was then carried out in 2 cm segments.  We then introduced the sheath over the wire into the anterior accessory saphenous vein.  The catheter was passed up the vein and positioned 2.03 cm from the GSV.  Tumescent solution was placed around the vein and radiofrequency ablation was carried out 7 cm segments.  We then turned our attention to the areas for stab phlebectomy.  They are all infiltrated local anesthetic.  Multiple stabs were made using ophthalmic blade and the veins were removed using a combination of jonnie hook mosquito clamps.  This was done for total of 30 stabs.  Sterile dressings were applied and the patient taken from the procedure room back to holding her in stable condition to be sent home.      Neal Roa MD, FACS      Again, thank you for allowing me to participate in the care of your patient.        Sincerely,        Neal Roa MD

## 2022-12-29 ENCOUNTER — ANCILLARY PROCEDURE (OUTPATIENT)
Dept: ULTRASOUND IMAGING | Facility: CLINIC | Age: 42
End: 2022-12-29
Attending: SPECIALIST
Payer: COMMERCIAL

## 2022-12-29 ENCOUNTER — OFFICE VISIT (OUTPATIENT)
Dept: VASCULAR SURGERY | Facility: CLINIC | Age: 42
End: 2022-12-29
Attending: SPECIALIST
Payer: COMMERCIAL

## 2022-12-29 DIAGNOSIS — I83.813 VARICOSE VEINS OF BOTH LOWER EXTREMITIES WITH PAIN: ICD-10-CM

## 2022-12-29 DIAGNOSIS — Z09 POSTOP CHECK: Primary | ICD-10-CM

## 2022-12-29 PROCEDURE — 93971 EXTREMITY STUDY: CPT | Mod: LT | Performed by: SPECIALIST

## 2022-12-29 PROCEDURE — 99207 PR NO CHARGE NURSE ONLY: CPT

## 2022-12-29 NOTE — PROGRESS NOTES
December 29, 2022    Vein Clinic Postoperative Nurse Note    Patient is here for her 48 hour postoperative visit.    Procedure: Left leg VNUS closure GSV, ASV(med nec), 20-30 stab phlebs(med [facility])   Procedure Date: 12/27/2022  Surgeon: Dr. Roa    Ultrasound Result: Left lower extremity negative for DVT. Left GSV is closed 8.7mm from SFJ to mid calf. Left ASV is closed 18mm from SFJ to proximal thigh.     Physical Exam: Incisions are approximated without signs of infection.  Ecchymosis: moderate to severe bruising noted throughout left lower extremity at phlebectomy sites and insertion sites.   Swelling: none noted  Paresthesia: patient denies at this time.     Patient Questions or Concerns: none    Reviewed postoperative instructions with patient and provided her with written material of common things to expect from her procedure.    Patient's Next Vein Clinic Appointment: 6 week follow up bilateral lower extremity Feb 21st 2023.    Yoli Rehman RN  [facility]

## 2022-12-29 NOTE — LETTER
12/29/2022         RE: Edda Schmidt  [address] [facility] [address]        Dear Colleague,    Thank you for referring your patient, Edda Schmidt, to the [facility]IN [facility] [place]. Please see a copy of my visit note below.      December 29, 2022    Vein Clinic Postoperative Nurse Note    Patient is here for her 48 hour postoperative visit.    Procedure: Left leg VNUS closure GSV, ASV(med nec), 20-30 stab phlebs(med nec)   Procedure Date: 12/27/2022  Surgeon: Dr. Roa    Ultrasound Result: Left lower extremity negative for DVT. Left GSV is closed 8.7mm from SFJ to mid calf. Left ASV is closed 18mm from SFJ to proximal thigh.     Physical Exam: Incisions are approximated without signs of infection.  Ecchymosis: moderate to severe bruising noted throughout left lower extremity at phlebectomy sites and insertion sites.   Swelling: none noted  Paresthesia: patient denies at this time.     Patient Questions or Concerns: none    Reviewed postoperative instructions with patient and provided her with written material of common things to expect from her procedure.    Patient's Next Vein Clinic Appointment: 6 week follow up bilateral lower extremity Feb 21st 2023.    Yoli Rehman RN  [facility]      Again, thank you for allowing me to participate in the care of your patient.        Sincerely,         VEIN NURSE

## 2023-01-03 NOTE — TELEPHONE ENCOUNTER
PB DOS: 1/31/2023  Type of Procedure: Total laparoscopic hysterectomy with bilateral salpingectomy  CPT Codes: 80697  ICD10 Codes: N92.1, N88.2, N39.3  Surgeon/Ordering provider: Veronica Collins DO  Pre-cert/Authorization completed:  no PA required   Payer: [facility]   Spoke to Jolynn CANO  Ref. # [id number] Auth #   Valid Dates:     Form and OV faxed to [facility]     This is not a guarantee of payment. Payment is subject to the patient's plan benefits and eligibility at the time services are rendered.

## 2023-01-23 ENCOUNTER — OFFICE VISIT (OUTPATIENT)
Dept: FAMILY MEDICINE | Facility: OTHER | Age: 43
End: 2023-01-23
Payer: COMMERCIAL

## 2023-01-23 VITALS
RESPIRATION RATE: 20 BRPM | HEART RATE: 75 BPM | SYSTOLIC BLOOD PRESSURE: 106 MMHG | TEMPERATURE: 97.3 F | WEIGHT: 205 LBS | OXYGEN SATURATION: 97 % | HEIGHT: 63 IN | DIASTOLIC BLOOD PRESSURE: 68 MMHG | BODY MASS INDEX: 36.32 KG/M2

## 2023-01-23 DIAGNOSIS — J45.20 MILD INTERMITTENT ASTHMA WITHOUT COMPLICATION: ICD-10-CM

## 2023-01-23 DIAGNOSIS — D35.02 ADRENAL ADENOMA, LEFT: ICD-10-CM

## 2023-01-23 DIAGNOSIS — N92.1 MENOMETRORRHAGIA: ICD-10-CM

## 2023-01-23 DIAGNOSIS — Z01.818 PREOP GENERAL PHYSICAL EXAM: Primary | ICD-10-CM

## 2023-01-23 PROCEDURE — 99213 OFFICE O/P EST LOW 20 MIN: CPT | Performed by: FAMILY MEDICINE

## 2023-01-23 RX ORDER — ALBUTEROL SULFATE 90 UG/1
2 AEROSOL, METERED RESPIRATORY (INHALATION) EVERY 6 HOURS PRN
Qty: 18 G | Refills: 1 | Status: SHIPPED | OUTPATIENT
Start: 2023-01-23 | End: 2023-06-14

## 2023-01-23 ASSESSMENT — PAIN SCALES - GENERAL: PAINLEVEL: NO PAIN (0)

## 2023-01-23 NOTE — PROGRESS NOTES
[address]  [facility] [address]  Phone: [phone]  Primary Provider: Rosetta Nicole  Pre-op Performing Provider: ROSETTA NICOLE      PREOPERATIVE EVALUATION:  Today's date: 1/23/2023    Edda Schmidt is a 43 year old female who presents for a preoperative evaluation.    Surgical Information:  Surgery/Procedure: laparoscopic total hysterectomy with bilateral salpinectomy  Surgery Location: [place]  Surgeon: Dr. Collins  Surgery Date: 1/31/23  Time of Surgery: unknown at time of pre-op  Where patient plans to recover: At home with family  Fax number for surgical facility: Note does not need to be faxed, will be available electronically in Epic.    Type of Anesthesia Anticipated: General    Assessment & Plan     The proposed surgical procedure is considered INTERMEDIATE risk.      ICD-10-CM    1. Preop general physical exam  Z01.818       2. Menometrorrhagia  N92.1                    Risks and Recommendations:  The patient has the following additional risks and recommendations for perioperative complications:  Pulmonary:    - Incentive spirometry post-op    Medication Instructions:   - SSRIs, SNRIs, TCAs, Antipsychotics: Continue without modification.    - rescue Inhaler: Continue PRN. Bring to hospital on the day of surgery.    RECOMMENDATION:  APPROVAL GIVEN to proceed with proposed procedure, without further diagnostic evaluation.    Currently on menses, pregnancy test can be completed on day of surgery. Advised abstinence in meantime.     14 minutes spent on the date of the encounter doing chart review, history and exam, documentation and further activities per the note      Subjective     HPI related to upcoming procedure: menorrhagia    Preop Questions 1/22/2023   1. Have you ever had a heart attack or stroke? No   2. Have you ever had surgery on your heart or blood vessels, such as a stent placement, a coronary artery bypass, or surgery on an  artery in your head, neck, heart, or legs? No   3. Do you have chest pain with activity? No   4. Do you have a history of  heart failure? No   5. Do you currently have a cold, bronchitis or symptoms of other infection? No   6. Do you have a cough, shortness of breath, or wheezing? No   7. Do you or anyone in your family have previous history of blood clots? YES - dad had clot after major trauma   8. Do you or does anyone in your family have a serious bleeding problem such as prolonged bleeding following surgeries or cuts? No   9. Have you ever had problems with anemia or been told to take iron pills? No   10. Have you had any abnormal blood loss such as black, tarry or bloody stools, or abnormal vaginal bleeding? No   11. Have you ever had a blood transfusion? No   12. Are you willing to have a blood transfusion if it is medically needed before, during, or after your surgery? Yes   13. Have you or any of your relatives ever had problems with anesthesia? No   14. Do you have sleep apnea, excessive snoring or daytime drowsiness? No   15. Do you have any artifical heart valves or other implanted medical devices like a pacemaker, defibrillator, or continuous glucose monitor? No   16. Do you have artificial joints? No   17. Are you allergic to latex? No   18. Is there any chance that you may be pregnant? No       Health Care Directive:  Patient does not have a Health Care Directive or Living Will: Discussed advance care planning with patient; information given to patient to review.    Preoperative Review of :   reviewed - given meds after vein stripping - vicodin, lorazepam          Review of Systems  CONSTITUTIONAL: NEGATIVE for fever, chills, change in weight  INTEGUMENTARY/SKIN: NEGATIVE for worrisome rashes, moles or lesions  EYES: NEGATIVE for vision changes or irritation  ENT/MOUTH: NEGATIVE for ear, mouth and throat problems  RESP: NEGATIVE for significant cough or SOB  CV: NEGATIVE for chest pain,  palpitations or peripheral edema  GI: NEGATIVE for nausea, abdominal pain, heartburn, or change in bowel habits  : NEGATIVE for frequency, dysuria, or hematuria  MUSCULOSKELETAL: NEGATIVE for significant arthralgias or myalgia  NEURO: NEGATIVE for weakness, dizziness or paresthesias  ENDOCRINE: NEGATIVE for temperature intolerance, skin/hair changes  HEME: NEGATIVE for bleeding problems  PSYCHIATRIC: NEGATIVE for changes in mood or affect    Patient Active Problem List    Diagnosis Date Noted     Menometrorrhagia 10/17/2022     Priority: Medium     Cervical os stenosis 10/17/2022     Priority: Medium     Stress incontinence of urine 2022     Priority: Medium     Added automatically from request for surgery [id number]       Segmental dysfunction of cervical region 2019     Priority: Medium     Segmental dysfunction of thoracic region 2019     Priority: Medium     Segmental dysfunction of sacral region 2019     Priority: Medium     Mechanical back pain 2019     Priority: Medium     Menstrual migraine 2019     Priority: Medium     Left-sided low back pain with left-sided sciatica 2019     Priority: Medium     Car sickness 2019     Priority: Medium     Asthma 2011     Priority: Medium     Overview:   Asthma  mild intermittant        Past Medical History:   Diagnosis Date     Arthritis 2019    L knee via xray by Dr. Briggs. Pain attributed to my back i     Car sickness      Motion sickness      Uncomplicated asthma [employer]    Exercise induced, worse in winter (cold-to-warm air)     Past Surgical History:   Procedure Laterality Date     ABDOMEN SURGERY  2002         APPENDECTOMY       CYSTOSCOPY, SLING TRANSVAGINAL N/A 02/15/2022    Procedure: CREATION, VAGINAL SLING, WITH CYSTOSCOPY  (Support the urethra with mesh and look in the bladder);  Surgeon: Jeremy Sherman MD;  Location: [facility] OR     ENT SURGERY      Tonsillectomy     INJECT EPIDURAL  "LUMBAR Left 05/10/2019    Procedure: left sided Lumbar 4-5 translaminar injection;  Surgeon: Jordy Tanner MD;  Location: [facility] OR     VASCULAR SURGERY  2008    Vericose vein stripping     Current Outpatient Medications   Medication Sig Dispense Refill     clobetasol (TEMOVATE) 0.05 % external cream Apply topically 2 times daily 60 g 4     clobetasol (TEMOVATE) 0.05 % external ointment Apply topically At Bedtime 60 g 0     diclofenac (VOLTAREN) 1 % topical gel Apply 2 g topically 4 times daily 50 g 1     methocarbamol (ROBAXIN) 750 MG tablet Take 1 tablet (750 mg) by mouth 4 times daily as needed for muscle spasms 30 tablet 3     Misc Natural Products (COSAMIN ASU FOR JOINT HEALTH PO)        ondansetron (ZOFRAN) 8 MG tablet Take 1 tablet (8 mg) by mouth every 8 hours as needed for nausea 30 tablet 1     venlafaxine (EFFEXOR XR) 150 MG 24 hr capsule Take 1 capsule (150 mg) by mouth daily 90 capsule 1     diclofenac (VOLTAREN) 75 MG EC tablet Take 1 tablet (75 mg) by mouth 2 times daily (Patient not taking: Reported on 1/23/2023) 40 tablet 1       No Known Allergies     Social History     Tobacco Use     Smoking status: Never     Smokeless tobacco: Never   Substance Use Topics     Alcohol use: Not Currently       History   Drug Use No         Objective     /68   Pulse 75   Temp 97.3  F (36.3  C) (Temporal)   Resp 20   Ht 1.61 m (5' 3.39\")   Wt 93 kg (205 lb)   LMP 01/19/2023   SpO2 97%   BMI 35.87 kg/m      Physical Exam    GENERAL APPEARANCE: healthy, alert and no distress     EYES: EOMI, PERRL     HENT: ear canals and TM's normal and nose and mouth without ulcers or lesions     NECK: no adenopathy, no asymmetry, masses, or scars and thyroid normal to palpation     RESP: lungs clear to auscultation - no rales, rhonchi or wheezes     CV: regular rates and rhythm, normal S1 S2, no S3 or S4 and no murmur, click or rub     ABDOMEN:  soft, nontender, no HSM or masses and bowel sounds normal     MS: " extremities normal- no gross deformities noted, no evidence of inflammation in joints, FROM in all extremities.     SKIN: no suspicious lesions or rashes     NEURO: Normal strength and tone, sensory exam grossly normal, mentation intact and speech normal     PSYCH: mentation appears normal. and affect normal/bright     LYMPHATICS: No cervical adenopathy    No results for input(s): HGB, PLT, INR, NA, POTASSIUM, CR, A1C in the last 68019 hours.     Diagnostics:  No labs were ordered during this visit.   No EKG required for low risk surgery (cataract, skin procedure, breast biopsy, etc).    Revised Cardiac Risk Index (RCRI):  The patient has the following serious cardiovascular risks for perioperative complications:   - No serious cardiac risks = 0 points     RCRI Interpretation: 0 points: Class I (very low risk - 0.4% complication rate)           Signed Electronically by: Rosetta Nicole MD, MD  Copy of this evaluation report is provided to requesting physician.

## 2023-01-23 NOTE — PATIENT INSTRUCTIONS
For informational purposes only. Not to replace the advice of your health care provider. Copyright   2003,  [employer] [facility]. All rights reserved. Clinically reviewed by Cori Omer MD. [employer] [id number] - REV .  Preparing for Your Surgery  Getting started  A nurse will call you to review your health history and instructions. They will give you an arrival time based on your scheduled surgery time. Please be ready to share:    Your doctor's clinic name and phone number    Your medical, surgical, and anesthesia history    A list of allergies and sensitivities    A list of medicines, including herbal treatments and over-the-counter drugs    Whether the patient has a legal guardian (ask how to send us the papers in advance)  Please tell us if you're pregnant--or if there's any chance you might be pregnant. Some surgeries may injure a fetus (unborn baby), so they require a pregnancy test. Surgeries that are safe for a fetus don't always need a test, and you can choose whether to have one.   If you have a child who's having surgery, please ask for a copy of Preparing for Your Child's Surgery.    Preparing for surgery    Within 10 to 30 days of surgery: Have a pre-op exam (sometimes called an H&P, or History and Physical). This can be done at a clinic or pre-operative center.  ? If you're having a , you may not need this exam. Talk to your care team.    At your pre-op exam, talk to your care team about all medicines you take. If you need to stop any medicines before surgery, ask when to start taking them again.  ? We do this for your safety. Many medicines can make you bleed too much during surgery. Some change how well surgery (anesthesia) drugs work.    Call your insurance company to let them know you're having surgery. (If you don't have insurance, call [phone].)    Call your clinic if there's any change in your health. This includes signs of a cold or flu (sore throat, runny nose,  cough, rash, fever). It also includes a scrape or scratch near the surgery site.    If you have questions on the day of surgery, call your hospital or surgery center.  Eating and drinking guidelines  For your safety: Unless your surgeon tells you otherwise, follow the guidelines below.    Eat and drink as usual until 8 hours before you arrive for surgery. After that, no food or milk.    Drink clear liquids until 2 hours before you arrive. These are liquids you can see through, like water, Gatorade, and Propel Water. They also include plain black coffee and tea (no cream or milk), candy, and breath mints. You can spit out gum when you arrive.    If you drink alcohol: Stop drinking it the night before surgery.    If your care team tells you to take medicine on the morning of surgery, it's okay to take it with a sip of water.  Preventing infection    Shower or bathe the night before and morning of your surgery. Follow the instructions your clinic gave you. (If no instructions, use regular soap.)    Don't shave or clip hair near your surgery site. We'll remove the hair if needed.    Don't smoke or vape the morning of surgery. You may chew nicotine gum up to 2 hours before surgery. A nicotine patch is okay.  ? Note: Some surgeries require you to completely quit smoking and nicotine. Check with your surgeon.    Your care team will make every effort to keep you safe from infection. We will:  ? Clean our hands often with soap and water (or an alcohol-based hand rub).  ? Clean the skin at your surgery site with a special soap that kills germs.  ? Give you a special gown to keep you warm. (Cold raises the risk of infection.)  ? Wear special hair covers, masks, gowns and gloves during surgery.  ? Give antibiotic medicine, if prescribed. Not all surgeries need antibiotics.  What to bring on the day of surgery    Photo ID and insurance card    Copy of your health care directive, if you have one    Glasses and hearing aids (bring  cases)  ? You can't wear contacts during surgery    Inhaler and eye drops, if you use them (tell us about these when you arrive)    CPAP machine or breathing device, if you use them    A few personal items, if spending the night    If you have . . .  ? A pacemaker, ICD (cardiac defibrillator) or other implant: Bring the ID card.  ? An implanted stimulator: Bring the remote control.  ? A legal guardian: Bring a copy of the certified (court-stamped) guardianship papers.  Please remove any jewelry, including body piercings. Leave jewelry and other valuables at home.  If you're going home the day of surgery    You must have a responsible adult drive you home. They should stay with you overnight as well.    If you don't have someone to stay with you, and you aren't safe to go home alone, we may keep you overnight. Insurance often won't pay for this.  After surgery  If it's hard to control your pain or you need more pain medicine, please call your surgeon's office.  Questions?   If you have any questions for your care team, list them here: _________________________________________________________________________________________________________________________________________________________________________ ____________________________________ ____________________________________ ____________________________________

## 2023-01-24 ENCOUNTER — MYC MEDICAL ADVICE (OUTPATIENT)
Dept: OBGYN | Facility: OTHER | Age: 43
End: 2023-01-24
Payer: COMMERCIAL

## 2023-01-31 ENCOUNTER — TELEPHONE (OUTPATIENT)
Dept: OBGYN | Facility: CLINIC | Age: 43
End: 2023-01-31
Payer: COMMERCIAL

## 2023-01-31 DIAGNOSIS — G89.18 ACUTE POST-OPERATIVE PAIN: Primary | ICD-10-CM

## 2023-01-31 NOTE — TELEPHONE ENCOUNTER
Patient might still be at [facility], as her surgery was at noon today. Will postpone message until tomorrow.    Can fit patient into Dr. Collins's schedule on 02/17 @ 1:30 PM in [place].  Cheri Cummings CMA

## 2023-01-31 NOTE — TELEPHONE ENCOUNTER
This patient needs an appointment in 2 weeks for a postop visit.  She can be worked into my schedule.  Thanks.    Veronica Collins, DO

## 2023-02-01 ENCOUNTER — TELEPHONE (OUTPATIENT)
Dept: FAMILY MEDICINE | Facility: OTHER | Age: 43
End: 2023-02-01
Payer: COMMERCIAL

## 2023-02-01 RX ORDER — OXYCODONE HYDROCHLORIDE 5 MG/1
TABLET ORAL
COMMUNITY
Start: 2023-01-31 | End: 2023-02-21

## 2023-02-01 RX ORDER — OXYCODONE HYDROCHLORIDE 5 MG/1
TABLET ORAL
Status: CANCELLED | OUTPATIENT
Start: 2023-02-01

## 2023-02-01 NOTE — TELEPHONE ENCOUNTER
Patient calling needs to schedule a surgical follow up visit with Dr Doty in 2 weeks.  Has hysterectomy was done 1/31/23. Could someone reach out to her and help schedule her.    Also she is asking for another script for pain medication.  Oxycodone was given.    Nicky Eagle RN  [facility] ~ Registered Nurse  Clinic Triage ~ [place] [facility] [place]  February 1, 2023

## 2023-02-01 NOTE — TELEPHONE ENCOUNTER
Patient calling needs to schedule a surgical follow up visit with Dr Doty in 2 weeks.  Has hysterectomy was done 1/31/23. Could someone reach out to her and help schedule her.     Also she is asking for another script for pain medication.  Oxycodone was given.     Nicky Eagle RN  [facility] ~ Registered Nurse  Clinic Triage ~ [place] [facility] [place]  February 1, 2023      Spoke to patient and scheduled 2/17. Patient also requesting another script for her Oxycodone as she's needing to take 2 a day now. Will route to RN's to review. Patient requesting to use [facility] [place]

## 2023-02-01 NOTE — TELEPHONE ENCOUNTER
Pt underwent total hysterectomy 1/31, Prescribed 5 mg oxycodone, 15 tablets at [facility]    Pt requesting refill of oxycodone.    She has been taking ibuprofen Q 6 hrs. Had increased abdominal pain last night and took 10 mg oxycodone Q 4 hrs over night. She is rating her abdominal pain a 4 out of 10 now. Has taken 5-10 mg of oxycodone Q 4 hrs today along with her ibuprofen. Now starting to use tylenol and heat also. Denies fever, no other concerns. Has not had a BM yet.    Recommended staggering ibuprofen and tylenol. Discussed oxycodone and constipation, to stay hydrated and consider stool softener. Pt concerned about increased pain overnight tonight.    Routing to provider for review. Pharmacy [facility] in [place].    Rupa Fuchs RN on 2/1/2023 at 2:56 PM

## 2023-02-01 NOTE — TELEPHONE ENCOUNTER
Left message for patient to return call. See message below if she calls back    Denisa Argu[place] MA

## 2023-02-02 RX ORDER — OXYCODONE HYDROCHLORIDE 5 MG/1
5 TABLET ORAL EVERY 6 HOURS PRN
Qty: 10 TABLET | Refills: 0 | Status: SHIPPED | OUTPATIENT
Start: 2023-02-02 | End: 2023-02-05

## 2023-02-03 ENCOUNTER — TELEPHONE (OUTPATIENT)
Dept: OBGYN | Facility: CLINIC | Age: 43
End: 2023-02-03
Payer: COMMERCIAL

## 2023-02-03 NOTE — TELEPHONE ENCOUNTER
Unable to reach patient via phone. RN left a message and instructed patient to call the clinic at [phone].    RN sent pt mychart with providers response.    Tara Villanueva RN on 2/3/2023 at 7:43 AM

## 2023-02-03 NOTE — TELEPHONE ENCOUNTER
Please let patient know I sent #10 more pills in for her.  If her pain persists, she needs to be seen in the clinic.  Thanks.    Veronica Collins, DO

## 2023-02-03 NOTE — TELEPHONE ENCOUNTER
Pt read [facility] message. RN closing encounter.    Tara Villanueva RN on 2/3/2023 at 12:22 PM

## 2023-02-04 NOTE — TELEPHONE ENCOUNTER
Specimen:    Uterus, Cervix, Bilateral Fallopian Tubes                                                  Final Diagnosis      Uterus, cervix, and bilateral fallopian tubes, hysterectomy and salpingectomy-  1.  Secretory endometrium.  2.  Benign leiomyoma and adenomyosis.  3.  Benign cervix and bilateral fallopian tubes.            Please call patient with her normal pathology results from surgery.  Let her know that there are no signs of cancer. Thanks!    ~Veronica Collins, DO

## 2023-02-06 NOTE — TELEPHONE ENCOUNTER
Pt responded via [employer]. No further questions. RN closing encounter.    Tara Villanueva RN on 2/6/2023 at 10:23 AM

## 2023-02-06 NOTE — TELEPHONE ENCOUNTER
Unable to reach patient via phone. RN left a message and instructed patient to call the clinic at [phone].    RN also sent [employer] message.    Tara Villanueva RN on 2/6/2023 at 8:16 AM

## 2023-02-17 ENCOUNTER — OFFICE VISIT (OUTPATIENT)
Dept: OBGYN | Facility: OTHER | Age: 43
End: 2023-02-17
Payer: COMMERCIAL

## 2023-02-17 VITALS
WEIGHT: 207 LBS | SYSTOLIC BLOOD PRESSURE: 142 MMHG | DIASTOLIC BLOOD PRESSURE: 83 MMHG | HEART RATE: 84 BPM | OXYGEN SATURATION: 98 % | BODY MASS INDEX: 36.22 KG/M2

## 2023-02-17 DIAGNOSIS — Z90.710 S/P LAPAROSCOPIC HYSTERECTOMY: Primary | ICD-10-CM

## 2023-02-17 PROBLEM — N88.2 CERVICAL OS STENOSIS: Status: RESOLVED | Noted: 2022-10-17 | Resolved: 2023-02-17

## 2023-02-17 PROBLEM — N92.1 MENOMETRORRHAGIA: Status: RESOLVED | Noted: 2022-10-17 | Resolved: 2023-02-17

## 2023-02-17 PROCEDURE — 99024 POSTOP FOLLOW-UP VISIT: CPT | Performed by: OBSTETRICS & GYNECOLOGY

## 2023-02-17 NOTE — PATIENT INSTRUCTIONS
If you have labs or imaging done, the results will automatically release in [email or web address] without an interpretation.  Your health care professional will review those results and send an interpretation with recommendations as soon as possible, but this may be 1-3 business days.    If you have any questions regarding your visit, please contact your care team.     [employer] [facility]: [phone]  [facility] CLINIC HOURS TELEPHONE NUMBER     Veronica DO Cheri Collins - Certified Medical Assistant    Rebeca Caldera-  Janae-     Monday- [place]  8:00 a.m - 5:00 p.m    Tuesday- [place]  8:00 a.m - 5:00 p.m    Friday- [place]  8:00 a.m - 5:00 p.m.    Typical Surgery Day: Thursday [facility]  [address]  [place] [address]  Phone: [phone]   Fax: [phone]   Imaging Scheduling- [phone]    [facility]  [address] [place] [address] [place] [address]  [phone]    [address] [address]  Phone: [phone]   Fax: [phone]   Imaging Scheduling- [phone]       **Surgeries** Our Surgery Schedulers will contact you to schedule. If you do not receive a call within 3 business days, please call [phone].    Urgent Care locations:  [facility] Monday-Friday  10 am - 8 pm  Saturday and Sunday   9 am - 5 pm  Monday-Friday   10 am - 8 pm  Saturday and Sunday   9 am - 5 pm   [phone]     If you need a medication refill, please contact your pharmacy. Please allow 3 business days for your refill to be completed.  As always, Thank you for trusting us with your healthcare needs!    see additional instructions from your care team below

## 2023-02-17 NOTE — PROGRESS NOTES
Subjective  43 year old non-pregnant female presents today as a post-op from a TLH with bilateral salpingectomy on 2023 due to menometrorrhagia.  Patient states she is doing well.  No pain.  No vaginal bleeding/spotting.  No problems urinating.  Normal bowel movements.  Nothing per vagina.  We reviewed her pictures and pathology from surgery and all questions were answered.      ROS: 10 point ROS neg other than the symptoms noted above in the HPI.  Past Medical History:   Diagnosis Date     Arthritis 2019    L knee via xray by Dr. Briggs. Pain attributed to my back i     Car sickness      Motion sickness      Uncomplicated asthma [employer]    Exercise induced, worse in winter (cold-to-warm air)     Past Surgical History:   Procedure Laterality Date     ABDOMEN SURGERY  2002         CYSTOSCOPY, SLING TRANSVAGINAL N/A 02/15/2022    Procedure: CREATION, VAGINAL SLING, WITH CYSTOSCOPY  (Support the urethra with mesh and look in the bladder);  Surgeon: Jeremy Sherman MD;  Location: [facility] OR     ENT SURGERY      Tonsillectomy     HYSTERECTOMY  2023     INJECT EPIDURAL LUMBAR Left 05/10/2019    Procedure: left sided Lumbar 4-5 translaminar injection;  Surgeon: Jordy Tanner MD;  Location: [facility]     LAPAROSCOPIC HYSTERECTOMY TOTAL, SALPINGECTOMY BILATERAL Bilateral 2023    TLH with B/L salpingectomy.  Menometrorrhagia     VASCULAR SURGERY      Vericose vein stripping     Family History   Problem Relation Age of Onset     Depression Mother      Diabetes Father      Hypertension Father      Social History     Tobacco Use     Smoking status: Never     Smokeless tobacco: Never   Substance Use Topics     Alcohol use: Not Currently         Objective  Vitals: BP (!) 142/83 (BP Location: Right arm, Patient Position: Chair, Cuff Size: Adult Regular)   Pulse 84   Wt 93.9 kg (207 lb)   LMP 2023   SpO2 98%   Breastfeeding No   BMI 36.22 kg/m    BMI= Body mass index is 36.22  kg/m .      Abd=soft, Nontender/nondistended, incisions=healed well      Pathology=1/31/2023:  Specimen:    Uterus, Cervix, Bilateral Fallopian Tubes                                                Final Diagnosis        Uterus, cervix, and bilateral fallopian tubes, hysterectomy and salpingectomy-  1.  Secretory endometrium.  2.  Benign leiomyoma and adenomyosis.  3.  Benign cervix and bilateral fallopian tubes.         Assessment  1.)  S/p TLH with bilateral salpingectomy on 1/31/2023 due to menometrorrhagia=benign pathology      Plan  1.)  Follow-up in 4 weeks    25 minutes were spent on the date of the encounter doing chart review, history and exam, documentation, and further activities as noted above      Veronica Collins DO

## 2023-02-21 ENCOUNTER — OFFICE VISIT (OUTPATIENT)
Dept: VASCULAR SURGERY | Facility: CLINIC | Age: 43
End: 2023-02-21
Payer: COMMERCIAL

## 2023-02-21 DIAGNOSIS — I83.813 VARICOSE VEINS OF BOTH LOWER EXTREMITIES WITH PAIN: Primary | ICD-10-CM

## 2023-02-21 DIAGNOSIS — I78.1 SPIDER VEINS: ICD-10-CM

## 2023-02-21 PROCEDURE — S9999 SALES TAX: HCPCS | Performed by: SPECIALIST

## 2023-02-21 PROCEDURE — 36468 NJX SCLRSNT SPIDER VEINS: CPT | Performed by: SPECIALIST

## 2023-02-21 NOTE — LETTER
2/21/2023         RE: Edda Schmidt  [address] [address] [place] [id number]        Dear Colleague,    Thank you for referring your patient, Edda Schmidt, to the [facility] [place]. Please see a copy of my visit note below.    Follow-up for bilateral GSV and ASV RF ablations with stab phlebectomies    Subjective:  Patient's legs feel much better.  Does have a vein on her buttock she like to get injected with some sclerotherapy.    Objective:  Lower extremities: Varicose veins resolved.  Scattered spider veins.  Right buttock prominent vein.    Assessment/plan:  Patient is status post bilateral GSV and AASV RF ablations.  Much improved.  She also has a vein on her right buttock she would like injected.  It explained this is cosmetic.  She still wishes to proceed.  It will be done today.  Will appear under separate dictation.    Neal Roa MD, FACS      Sclerotherapy dictation note:    The veins of the right buttock were injected with 0.5% polidocanol as well as a vein over the left knee.  This was a half session with 2 cc of 0.5% polidocanol injected.  Patient tolerated procedure without complication.    Neal Roa MD, FACS                        Again, thank you for allowing me to participate in the care of your patient.        Sincerely,        Neal Roa MD

## 2023-02-21 NOTE — PROGRESS NOTES
Follow-up for bilateral GSV and ASV RF ablations with stab phlebectomies    Subjective:  Patient's legs feel much better.  Does have a vein on her buttock she like to get injected with some sclerotherapy.    Objective:  Lower extremities: Varicose veins resolved.  Scattered spider veins.  Right buttock prominent vein.    Assessment/plan:  Patient is status post bilateral GSV and AASV RF ablations.  Much improved.  She also has a vein on her right buttock she would like injected.  It explained this is cosmetic.  She still wishes to proceed.  It will be done today.  Will appear under separate dictation.    Neal Roa MD, FACS      Sclerotherapy dictation note:    The veins of the right buttock were injected with 0.5% polidocanol as well as a vein over the left knee.  This was a half session with 2 cc of 0.5% polidocanol injected.  Patient tolerated procedure without complication.    Neal Roa MD, FACS

## 2023-03-13 ENCOUNTER — MYC MEDICAL ADVICE (OUTPATIENT)
Dept: OBGYN | Facility: OTHER | Age: 43
End: 2023-03-13

## 2023-03-17 ENCOUNTER — OFFICE VISIT (OUTPATIENT)
Dept: OBGYN | Facility: OTHER | Age: 43
End: 2023-03-17
Payer: COMMERCIAL

## 2023-03-17 VITALS
HEART RATE: 62 BPM | OXYGEN SATURATION: 99 % | BODY MASS INDEX: 35.17 KG/M2 | SYSTOLIC BLOOD PRESSURE: 128 MMHG | WEIGHT: 201 LBS | DIASTOLIC BLOOD PRESSURE: 86 MMHG

## 2023-03-17 DIAGNOSIS — Z90.710 S/P LAPAROSCOPIC HYSTERECTOMY: Primary | ICD-10-CM

## 2023-03-17 DIAGNOSIS — Z90.79 H/O BILATERAL SALPINGECTOMY: ICD-10-CM

## 2023-03-17 PROCEDURE — 99024 POSTOP FOLLOW-UP VISIT: CPT | Performed by: OBSTETRICS & GYNECOLOGY

## 2023-03-17 ASSESSMENT — PAIN SCALES - GENERAL: PAINLEVEL: NO PAIN (0)

## 2023-03-17 NOTE — PATIENT INSTRUCTIONS
Please call if you any questions.    [address]   [address]  [phone]        Veronica Collins,

## 2023-03-17 NOTE — PROGRESS NOTES
Subjective  43 year old non-pregnant female presents today as a post-op from a [facility] with bilateral salpingectomy on 2023 due to menometrorrhagia.  Patient states she is doing well.  No pain.  No vaginal bleeding/spotting.  No problems urinating.  Normal bowel movements.  Patient did have intercourse at 5 weeks postop however denies any issues.      ROS: 10 point ROS neg other than the symptoms noted above in the HPI.  Past Medical History:   Diagnosis Date     Arthritis     L knee via xray by Dr. Briggs. Pain attributed to my back i     Car sickness      Motion sickness      Uncomplicated asthma [employer]    Exercise induced, worse in winter (cold-to-warm air)     Past Surgical History:   Procedure Laterality Date     ABDOMEN SURGERY  2002         CYSTOSCOPY, SLING TRANSVAGINAL N/A 02/15/2022    Procedure: CREATION, VAGINAL SLING, WITH CYSTOSCOPY  (Support the urethra with mesh and look in the bladder);  Surgeon: Jeremy Sherman MD;  Location: [facility] OR     ENT SURGERY      Tonsillectomy     HYSTERECTOMY  2023     INJECT EPIDURAL LUMBAR Left 05/10/2019    Procedure: left sided Lumbar 4-5 translaminar injection;  Surgeon: Jordy Tanner MD;  [facility]     LAPAROSCOPIC HYSTERECTOMY TOTAL, SALPINGECTOMY BILATERAL Bilateral 2023    TL with B/L salpingectomy.  Menometrorrhagia     VASCULAR SURGERY      Vericose vein stripping     Family History   Problem Relation Age of Onset     Depression Mother      Diabetes Father      Hypertension Father      Social History     Tobacco Use     Smoking status: Never     Smokeless tobacco: Never   Substance Use Topics     Alcohol use: Not Currently         Objective  Vitals: LMP 2023   BMI= There is no height or weight on file to calculate BMI.      PELVIC:    External genitalia: normal without lesion  Vagina: normal mucosa and rugae, no discharge, vaginal cuff appears intact, small amount of granulation tissue midline, no  active bleeding, no old blood in vaginal vault.  Cervix: Absent   Uterus: Absent  Adnexa: non tender, without masses  Rectal: deffered  Ext=no clubbing or cyanosis      Assessment  1.)  S/p TLH with bilateral salpingectomy on 1/31/2023 due to menometrorrhagia=benign pathology        Plan  1.)  Follow-up as needed       25 minutes were spent on the date of the encounter doing chart review, history and exam, documentation, and further activities as noted above.  Veronica Collins DO

## 2023-03-23 DIAGNOSIS — R45.86 MOOD SWINGS: ICD-10-CM

## 2023-03-23 NOTE — TELEPHONE ENCOUNTER
"Pending Prescriptions:                       Disp   Refills    venlafaxine (EFFEXOR XR) 150 MG 24 hr caps*90 cap*1        Sig: TAKE ONE CAPSULE BY MOUTH ONCE DAILY    Routing refill request to provider for review/approval because:  Requested Prescriptions   Pending Prescriptions Disp Refills    venlafaxine (EFFEXOR XR) 150 MG 24 hr capsule [Pharmacy Med Name: VENLAFAXINE HCL ER 150MG CP24] 90 capsule 1     Sig: TAKE ONE CAPSULE BY MOUTH ONCE DAILY       Serotonin-Norepinephrine Reuptake Inhibitors  Failed - 3/23/2023  5:52 AM        Failed - Normal serum creatinine on file in past 12 months     No lab results found.    Ok to refill medication if creatinine is low          Passed - Blood pressure under 140/90 in past 12 months     BP Readings from Last 3 Encounters:   03/17/23 128/86   02/17/23 (!) 142/83   01/23/23 106/68                 Passed - Recent (12 mo) or future (30 days) visit within the authorizing provider's specialty     Patient has had an office visit with the authorizing provider or a provider within the authorizing providers department within the previous 12 mos or has a future within next 30 days. See \"Patient Info\" tab in inbasket, or \"Choose Columns\" in Meds & Orders section of the refill encounter.              Passed - Medication is active on med list        Passed - Patient is age 18 or older        Passed - No active pregnancy on record        Passed - No positive pregnancy test in past 12 months           venlafaxine (EFFEXOR XR) 150 MG 24 hr capsule 90 capsule 1 9/7/2022  No   Sig - Route: Take 1 capsule (150 mg) by mouth daily - Oral   Sent to pharmacy as: Venlafaxine HCl  MG Oral Capsule Extended Release 24 Hour (EFFEXOR XR)   Class: E-Prescribe   Order: [id number]   E-Prescribing Status: Receipt confirmed by pharmacy (9/7/2022  6:26 AM CDT)     Charlette Mcgergor RN on 3/23/2023 at 2:14 PM        "

## 2023-03-26 RX ORDER — VENLAFAXINE HYDROCHLORIDE 150 MG/1
CAPSULE, EXTENDED RELEASE ORAL
Qty: 90 CAPSULE | Refills: 0 | Status: SHIPPED | OUTPATIENT
Start: 2023-03-26 | End: 2023-06-14

## 2023-03-26 NOTE — TELEPHONE ENCOUNTER
Due for annual well appt. Sonali given, please schedule. (Phone, ov, or evisit as appropriate).  Rosetta Nicole MD

## 2023-03-27 ENCOUNTER — MYC MEDICAL ADVICE (OUTPATIENT)
Dept: FAMILY MEDICINE | Facility: OTHER | Age: 43
End: 2023-03-27
Payer: COMMERCIAL

## 2023-03-27 ENCOUNTER — TELEPHONE (OUTPATIENT)
Dept: FAMILY MEDICINE | Facility: OTHER | Age: 43
End: 2023-03-27
Payer: COMMERCIAL

## 2023-03-27 DIAGNOSIS — T75.3XXD CAR SICKNESS, SUBSEQUENT ENCOUNTER: ICD-10-CM

## 2023-03-27 RX ORDER — ONDANSETRON 8 MG/1
8 TABLET, FILM COATED ORAL EVERY 8 HOURS PRN
Qty: 30 TABLET | Refills: 1 | Status: SHIPPED | OUTPATIENT
Start: 2023-03-27 | End: 2023-06-14

## 2023-03-27 NOTE — TELEPHONE ENCOUNTER
Await documentation from pharmacy/insurance.    Charlette Mcgregor, BSN, RN, PHN  Registered Nurse-Clinic Triage  [facility]  3/27/2023 at 11:04 AM

## 2023-03-27 NOTE — TELEPHONE ENCOUNTER
Prior Authorization Retail Medication Request    Medication/Dose:   ICD code (if different than what is on RX):   Previously Tried and Failed:    Rationale:     Insurance Name: [employer]  Insurance ID:  [id number]      Pharmacy Information (if different than what is on RX)  Name:    Phone:              Trying for a prior approval override for ondansetron  8mg tablets as insurance limits qty to 9/30 days --- Edda would like 30/30 days if we can with insurance -- thank you

## 2023-03-29 NOTE — TELEPHONE ENCOUNTER
Central Prior Authorization Team   Phone: [phone]    PA Initiation    Medication: ondansetron  8mg tablets   Insurance Company: [employer] - Phone [phone] Fax [phone]  Pharmacy Filling the Rx: [facility] [place] - [address]   Filling Pharmacy Phone: [phone]  Filling Pharmacy Fax:    Start Date: 3/29/2023

## 2023-03-30 NOTE — TELEPHONE ENCOUNTER
Prior Authorization Not Needed per Insurance    Medication: ondansetron  8mg tablets   Insurance Company: [employer] - Phone [phone] Fax [phone]  Expected CoPay:      Pharmacy Filling the Rx: [facility] - [place] - [address]  Pharmacy Notified: Yes  Patient Notified: Yes  **Instructed pharmacy to notify patient when script is ready to /ship.**    Insurance will only allow a qty of 9 for a 30 day supply.

## 2023-06-14 ENCOUNTER — OFFICE VISIT (OUTPATIENT)
Dept: FAMILY MEDICINE | Facility: OTHER | Age: 43
End: 2023-06-14
Payer: COMMERCIAL

## 2023-06-14 VITALS
DIASTOLIC BLOOD PRESSURE: 68 MMHG | RESPIRATION RATE: 16 BRPM | SYSTOLIC BLOOD PRESSURE: 112 MMHG | HEIGHT: 63 IN | BODY MASS INDEX: 36.86 KG/M2 | HEART RATE: 73 BPM | TEMPERATURE: 97.4 F | WEIGHT: 208 LBS | OXYGEN SATURATION: 97 %

## 2023-06-14 DIAGNOSIS — J45.20 MILD INTERMITTENT ASTHMA WITHOUT COMPLICATION: ICD-10-CM

## 2023-06-14 DIAGNOSIS — Z00.00 ROUTINE GENERAL MEDICAL EXAMINATION AT A HEALTH CARE FACILITY: Primary | ICD-10-CM

## 2023-06-14 DIAGNOSIS — T75.3XXD CAR SICKNESS, SUBSEQUENT ENCOUNTER: ICD-10-CM

## 2023-06-14 DIAGNOSIS — N63.11 MASS OF UPPER OUTER QUADRANT OF RIGHT BREAST: ICD-10-CM

## 2023-06-14 DIAGNOSIS — Z80.3 FAMILY HISTORY OF MALIGNANT NEOPLASM OF BREAST: ICD-10-CM

## 2023-06-14 DIAGNOSIS — R45.86 MOOD SWINGS: ICD-10-CM

## 2023-06-14 PROCEDURE — 99396 PREV VISIT EST AGE 40-64: CPT | Performed by: FAMILY MEDICINE

## 2023-06-14 PROCEDURE — 99213 OFFICE O/P EST LOW 20 MIN: CPT | Mod: 25 | Performed by: FAMILY MEDICINE

## 2023-06-14 RX ORDER — ONDANSETRON 8 MG/1
8 TABLET, FILM COATED ORAL EVERY 8 HOURS PRN
Qty: 30 TABLET | Refills: 1 | Status: SHIPPED | OUTPATIENT
Start: 2023-06-14 | End: 2023-12-06

## 2023-06-14 RX ORDER — ALBUTEROL SULFATE 90 UG/1
2 AEROSOL, METERED RESPIRATORY (INHALATION) EVERY 6 HOURS PRN
Qty: 18 G | Refills: 1 | Status: SHIPPED | OUTPATIENT
Start: 2023-06-14

## 2023-06-14 RX ORDER — VENLAFAXINE HYDROCHLORIDE 150 MG/1
150 CAPSULE, EXTENDED RELEASE ORAL DAILY
Qty: 90 CAPSULE | Refills: 1 | Status: SHIPPED | OUTPATIENT
Start: 2023-06-14 | End: 2024-03-18

## 2023-06-14 ASSESSMENT — PATIENT HEALTH QUESTIONNAIRE - PHQ9: SUM OF ALL RESPONSES TO PHQ QUESTIONS 1-9: 0

## 2023-06-14 ASSESSMENT — ENCOUNTER SYMPTOMS
HEADACHES: 0
MYALGIAS: 0
COUGH: 0
SHORTNESS OF BREATH: 0
CHILLS: 0
PARESTHESIAS: 0
EYE PAIN: 0
DIZZINESS: 0
FREQUENCY: 0
HEMATOCHEZIA: 0
CONSTIPATION: 0
FEVER: 0
SORE THROAT: 0
WEAKNESS: 0
ABDOMINAL PAIN: 0
HEMATURIA: 0
ARTHRALGIAS: 0
DIARRHEA: 0
JOINT SWELLING: 0
NERVOUS/ANXIOUS: 0
NAUSEA: 0
BREAST MASS: 0
PALPITATIONS: 0
HEARTBURN: 0
DYSURIA: 0

## 2023-06-14 ASSESSMENT — ASTHMA QUESTIONNAIRES
QUESTION_3 LAST FOUR WEEKS HOW OFTEN DID YOUR ASTHMA SYMPTOMS (WHEEZING, COUGHING, SHORTNESS OF BREATH, CHEST TIGHTNESS OR PAIN) WAKE YOU UP AT NIGHT OR EARLIER THAN USUAL IN THE MORNING: NOT AT ALL
QUESTION_5 LAST FOUR WEEKS HOW WOULD YOU RATE YOUR ASTHMA CONTROL: COMPLETELY CONTROLLED
QUESTION_1 LAST FOUR WEEKS HOW MUCH OF THE TIME DID YOUR ASTHMA KEEP YOU FROM GETTING AS MUCH DONE AT WORK, SCHOOL OR AT HOME: NONE OF THE TIME
QUESTION_4 LAST FOUR WEEKS HOW OFTEN HAVE YOU USED YOUR RESCUE INHALER OR NEBULIZER MEDICATION (SUCH AS ALBUTEROL): NOT AT ALL
ACT_TOTALSCORE: 25
ACT_TOTALSCORE: 25
QUESTION_2 LAST FOUR WEEKS HOW OFTEN HAVE YOU HAD SHORTNESS OF BREATH: NOT AT ALL

## 2023-06-14 ASSESSMENT — PAIN SCALES - GENERAL: PAINLEVEL: NO PAIN (0)

## 2023-06-14 NOTE — PROGRESS NOTES
SUBJECTIVE:   CC: Janeen is an 43 year old who presents for preventive health visit.       6/14/2023     1:30 PM   Additional Questions   Roomed by Iraida     Healthy Habits:     Getting at least 3 servings of Calcium per day:  Yes    Bi-annual eye exam:  Yes    Dental care twice a year:  Yes    Sleep apnea or symptoms of sleep apnea:  None    Diet:  Regular (no restrictions)    Frequency of exercise:  2-3 days/week    Duration of exercise:  30-45 minutes    Taking medications regularly:  Yes    Medication side effects:  None    PHQ-2 Total Score: 0    Additional concerns today:  No              Depression Followup    How are you doing with your depression since your last visit? Improved     Are you having other symptoms that might be associated with depression? No    Have you had a significant life event?  Relationship Concerns and Housing Concerns     Are you feeling anxious or having panic attacks?   No    Do you have any concerns with your use of alcohol or other drugs? No    Social History     Tobacco Use     Smoking status: Never     Smokeless tobacco: Never     Tobacco comments:     Vape   Vaping Use     Vaping status: Never Used   Substance Use Topics     Alcohol use: Not Currently     Comment: Last drink 1/23     Drug use: No         6/14/2023     1:34 PM   PHQ   PHQ-9 Total Score 0   Q9: Thoughts of better off dead/self-harm past 2 weeks Not at all         9/7/2022     6:43 AM   WILL-7 SCORE   Total Score 1 (minimal anxiety)         6/14/2023     1:34 PM   Last PHQ-9   1.  Little interest or pleasure in doing things 0   2.  Feeling down, depressed, or hopeless 0   3.  Trouble falling or staying asleep, or sleeping too much 0   4.  Feeling tired or having little energy 0   5.  Poor appetite or overeating 0   6.  Feeling bad about yourself 0   7.  Trouble concentrating 0   8.  Moving slowly or restless 0   Q9: Thoughts of better off dead/self-harm past 2 weeks 0   PHQ-9 Total Score 0   Difficulty at work,  home, or with people Not difficult at all       Suicide Assessment Five-step Evaluation and Treatment (SAFE-T)      Today's PHQ-2 Score:       6/14/2023     8:21 AM   PHQ-2 ( 1999 [employer])   Q1: Little interest or pleasure in doing things 0   Q2: Feeling down, depressed or hopeless 0   PHQ-2 Score 0   Q1: Little interest or pleasure in doing things Not at all   Q2: Feeling down, depressed or hopeless Not at all   PHQ-2 Score 0           Social History     Tobacco Use     Smoking status: Never     Smokeless tobacco: Never     Tobacco comments:     Vape   Vaping Use     Vaping status: Never Used   Substance Use Topics     Alcohol use: Not Currently     Comment: Last drink 1/23 6/14/2023     8:21 AM   Alcohol Use   Prescreen: >3 drinks/day or >7 drinks/week? Not Applicable     Reviewed orders with patient.  Reviewed health maintenance and updated orders accordingly - Yes      Breast Cancer Screening:    FHS-7:       11/22/2021     4:09 PM 2/9/2022     1:53 PM 5/8/2022    10:32 AM 11/23/2022    10:38 AM 12/7/2022     3:03 PM 1/22/2023     2:50 PM 6/14/2023     8:23 AM   Breast CA Risk Assessment (FHS-7)   Did any of your first-degree relatives have breast or ovarian cancer? Yes No No No No No No   Did any of your relatives have bilateral breast cancer? No Yes Yes No No Yes Yes   Did any man in your family have breast cancer? No No No No No No No   Did any woman in your family have breast and ovarian cancer? No No No No No Unknown Yes   Did any woman in your family have breast cancer before age 50 y? No Yes Yes No No Unknown Yes   Do you have 2 or more relatives with breast and/or ovarian cancer? No Yes Unknown No No Yes Yes   Do you have 2 or more relatives with breast and/or bowel cancer? No Yes Unknown No No Yes Yes         Pertinent mammograms are reviewed under the imaging tab.    History of abnormal Pap smear: Status post benign hysterectomy. Health Maintenance and Surgical History updated.      Latest  "Ref Rng & Units 1/31/2019    10:25 AM 1/31/2019    10:15 AM   PAP / HPV   PAP (Historical)  NIL      HPV 16 DNA NEG^Negative  Negative     HPV 18 DNA NEG^Negative  Negative     Other HR HPV NEG^Negative  Negative       Reviewed and updated as needed this visit by clinical staff   Tobacco  Allergies  Meds  Problems  Med Hx  Surg Hx  Fam Hx          Reviewed and updated as needed this visit by Provider   Tobacco  Allergies  Meds  Problems  Med Hx  Surg Hx  Fam Hx             Review of Systems   Constitutional: Negative for chills and fever.   HENT: Negative for congestion, ear pain, hearing loss and sore throat.    Eyes: Negative for pain and visual disturbance.   Respiratory: Negative for cough and shortness of breath.    Cardiovascular: Negative for chest pain, palpitations and peripheral edema.   Gastrointestinal: Negative for abdominal pain, constipation, diarrhea, heartburn, hematochezia and nausea.   Breasts:  Positive for tenderness. Negative for breast mass and discharge.   Genitourinary: Negative for dysuria, frequency, genital sores, hematuria, pelvic pain, urgency, vaginal bleeding and vaginal discharge.   Musculoskeletal: Negative for arthralgias, joint swelling and myalgias.   Skin: Negative for rash.   Neurological: Negative for dizziness, weakness, headaches and paresthesias.   Psychiatric/Behavioral: Negative for mood changes. The patient is not nervous/anxious.           OBJECTIVE:   /68   Pulse 73   Temp 97.4  F (36.3  C) (Temporal)   Resp 16   Ht 1.61 m (5' 3.39\")   Wt 94.3 kg (208 lb)   LMP 01/19/2023   SpO2 97%   BMI 36.39 kg/m    Physical Exam  GENERAL: healthy, alert and no distress  EYES: Eyes grossly normal to inspection, PERRL and conjunctivae and sclerae normal  HENT: ear canals and TM's normal, nose and mouth without ulcers or lesions  NECK: no adenopathy, no asymmetry, masses, or scars and thyroid normal to palpation  RESP: lungs clear to auscultation - no " rales, rhonchi or wheezes  BREAST: R sided lump at 11 oclock  CV: regular rate and rhythm, normal S1 S2, no S3 or S4, no murmur, click or rub, no peripheral edema and peripheral pulses strong  ABDOMEN: soft, nontender, no hepatosplenomegaly, no masses and bowel sounds normal  MS: no gross musculoskeletal defects noted, no edema  SKIN: no suspicious lesions or rashes  NEURO: Normal strength and tone, mentation intact and speech normal  PSYCH: mentation appears normal, affect normal/bright        ASSESSMENT/PLAN:       ICD-10-CM    1. Routine general medical examination at a health care facility  Z00.00       2. Mood swings  R45.86 venlafaxine (EFFEXOR XR) 150 MG 24 hr capsule      3. Car sickness, subsequent encounter  T75.3XXD ondansetron (ZOFRAN) 8 MG tablet      4. Mild intermittent asthma without complication  J45.20 albuterol (PROAIR HFA/PROVENTIL HFA/VENTOLIN HFA) 108 (90 Base) MCG/ACT inhaler      5. Family history of malignant neoplasm of breast  Z80.3       6. Mass of upper outer quadrant of right breast  N63.11         Patient had noticed a lump in her breast approximately 2 weeks ago that has become uncomfortable for her.  Given the timeframe it likely is a breast cyst though with her hysterectomy it can be difficult to be able to monitor this so we did talk about potential risk factors.  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  This had resolved and her prolactin came back to normal.  She did notice that her breast can produce milky discharge now just recently again but it does not on both sides and only with stimulation.  We did talk about getting a prolactin level and considering whether or not we need an MRI though she states that she has been able to produce milky discharge over the years if she stimulates them enough and does not think this is new or abnormal.  I did tell her I would prefer to have some information about prolactin levels and potentially updating  "imaging but understand if this is a chronic unchanging condition that it would be unlikely to give us much benefit and could take quite a bit of cost.  Given that she would prefer watchful waiting for changes on this and this likely cyst.  We did talk to her about that if this lump in her breast does not resolve within the next month we should be getting a diagnostic breast evaluation at minimum.  Could consider prolactin levels at that same time if she is comfortable with it.    Patient has been advised of split billing requirements and indicates understanding: Yes      COUNSELING:  Reviewed preventive health counseling, as reflected in patient instructions       Regular exercise       Healthy diet/nutrition      BMI:   Estimated body mass index is 36.39 kg/m  as calculated from the following:    Height as of this encounter: 1.61 m (5' 3.39\").    Weight as of this encounter: 94.3 kg (208 lb).   Weight management plan: Discussed healthy diet and exercise guidelines      She reports that she has never smoked. She has never used smokeless tobacco.      Rosetta Nicole MD, MD  [facility]  "

## 2023-06-30 ENCOUNTER — MYC MEDICAL ADVICE (OUTPATIENT)
Dept: FAMILY MEDICINE | Facility: OTHER | Age: 43
End: 2023-06-30
Payer: COMMERCIAL

## 2023-06-30 DIAGNOSIS — N63.11 MASS OF UPPER OUTER QUADRANT OF RIGHT BREAST: Primary | ICD-10-CM

## 2023-06-30 NOTE — TELEPHONE ENCOUNTER
"See visit dated 6/14/23. Notes states: \"We did talk to her about that if this lump in her breast does not resolve within the next month we should be getting a diagnostic breast evaluation at minimum.\"    Charlette Mcgregor, FRANCYN, RN, PHN  Registered Nurse-Clinic Triage  [facility]  6/30/2023 at 8:56 AM      "

## 2023-07-10 ENCOUNTER — HOSPITAL ENCOUNTER (OUTPATIENT)
Dept: MRI IMAGING | Facility: CLINIC | Age: 43
Discharge: HOME | End: 2023-07-10
Attending: FAMILY MEDICINE | Admitting: FAMILY MEDICINE
Payer: COMMERCIAL

## 2023-07-10 DIAGNOSIS — D35.02 ADRENAL ADENOMA, LEFT: ICD-10-CM

## 2023-07-10 PROCEDURE — 74183 MRI ABD W/O CNTR FLWD CNTR: CPT

## 2023-07-10 PROCEDURE — A9585 GADOBUTROL INJECTION: HCPCS | Mod: JZ | Performed by: FAMILY MEDICINE

## 2023-07-10 PROCEDURE — 255N000002 HC RX 255 OP 636: Mod: JZ | Performed by: FAMILY MEDICINE

## 2023-07-10 RX ORDER — GADOBUTROL 604.72 MG/ML
10 INJECTION INTRAVENOUS ONCE
Status: COMPLETED | OUTPATIENT
Start: 2023-07-10 | End: 2023-07-10

## 2023-07-10 RX ADMIN — GADOBUTROL 9.5 ML: 604.72 INJECTION INTRAVENOUS at 09:16

## 2023-07-12 ENCOUNTER — HOSPITAL ENCOUNTER (OUTPATIENT)
Dept: MAMMOGRAPHY | Facility: CLINIC | Age: 43
Discharge: HOME | End: 2023-07-12
Attending: FAMILY MEDICINE
Payer: COMMERCIAL

## 2023-07-12 ENCOUNTER — MYC MEDICAL ADVICE (OUTPATIENT)
Dept: FAMILY MEDICINE | Facility: OTHER | Age: 43
End: 2023-07-12
Payer: COMMERCIAL

## 2023-07-12 ENCOUNTER — HOSPITAL ENCOUNTER (OUTPATIENT)
Dept: ULTRASOUND IMAGING | Facility: CLINIC | Age: 43
Discharge: HOME | End: 2023-07-12
Attending: FAMILY MEDICINE
Payer: COMMERCIAL

## 2023-07-12 DIAGNOSIS — N63.10 MASS OF RIGHT BREAST, UNSPECIFIED QUADRANT: Primary | ICD-10-CM

## 2023-07-12 DIAGNOSIS — N63.11 MASS OF UPPER OUTER QUADRANT OF RIGHT BREAST: ICD-10-CM

## 2023-07-12 PROCEDURE — 76642 ULTRASOUND BREAST LIMITED: CPT | Mod: RT

## 2023-07-12 PROCEDURE — 77061 BREAST TOMOSYNTHESIS UNI: CPT | Mod: RT

## 2023-07-12 NOTE — TELEPHONE ENCOUNTER
Last OV 6/14/23    Nicky Eagle RN  [facility] ~ Registered Nurse  Clinic Triage ~ [place] [facility] [place]  July 12, 2023

## 2023-07-19 ENCOUNTER — MYC MEDICAL ADVICE (OUTPATIENT)
Dept: FAMILY MEDICINE | Facility: OTHER | Age: 43
End: 2023-07-19
Payer: COMMERCIAL

## 2023-07-20 ENCOUNTER — NURSE TRIAGE (OUTPATIENT)
Dept: FAMILY MEDICINE | Facility: OTHER | Age: 43
End: 2023-07-20
Payer: COMMERCIAL

## 2023-07-20 NOTE — TELEPHONE ENCOUNTER
See message, patient was advised to call and speak with triage. Responded to [employer] message.     Charlette Mcgregor, FRANCYN, RN, PHN  Registered Nurse-Clinic Triage  [facility]  7/20/2023 at 7:13 AM

## 2023-07-20 NOTE — TELEPHONE ENCOUNTER
No openings in the clinic to accommodate patient. Contacted patient and advised her of this. Will send to PCP to see if patient can be seen sooner than next available to discuss headache management.     Recommended urgent care today for headache management. Patient is in agreement with this plan.     PCP-are you able to work patient in?     Charlette Mcgregor, FRANCYN, RN, PHN  Registered Nurse-Clinic Triage  [facility]  7/20/2023 at 10:13 AM

## 2023-07-20 NOTE — TELEPHONE ENCOUNTER
Reason for Call:  Appointment Request    Patient requesting this type of appt:  pain/illness    Requested provider: Rosetta Nicole    Reason patient unable to be scheduled: Not within requested timeframe    When does patient want to be seen/preferred time: Same day    Comments: migraine x2wks    Could we send this information to you in [facility] or would you prefer to receive a phone call?:   Patient would prefer a phone call   Okay to leave a detailed message?: Yes at Cell number on file:    Telephone Information:   Mobile [phone]       Call taken on 7/20/2023 at 7:05 AM by Nathaly Boyd V

## 2023-07-20 NOTE — TELEPHONE ENCOUNTER
"Contacted patient. She states that the migraine has been constant. She states that she has talked to Dr. Nicole about the several times. She states they are related to her hormones. When she has fluctuations she gets these. She has more photosensitivity and nausea with this one then she has with past migraines. Rates pain 5/10. Has been taking zofran for the nausea and this helps. She takes 1300 mg tylenol and 800 mg motrin three times per day and \"it barely takes the edge off\". She has tried heat, chiropractic care, and sleep-all without relief. Does not consider this to be the worst headache of her life. Hurts mainly around her eyes and temples-left greater than right. Denies weakness, numbness, tingling on one side of the face or body.     Patient is looking for something to help with this headache but also something preventative to help with future headaches.     Will send work in request for same day (>24 hours) or provider only approval required (>4 hours) spot as we are not in the triage window for taking these visits.    Charlette Mcgregor, FRANCYN, RN, PHN  Registered Nurse-Clinic Triage  [facility]  7/20/2023 at 9:20 AM    Reason for Disposition    Patient wants to be seen    Additional Information    Negative: Difficult to awaken or acting confused (e.g., disoriented, slurred speech)    Negative: Weakness of the face, arm or leg on one side of the body and new-onset    Negative: Numbness of the face, arm or leg on one side of the body and new-onset    Negative: Loss of speech or garbled speech and new-onset    Negative: Passed out (i.e., fainted, collapsed and was not responding)    Negative: Sounds like a life-threatening emergency to the triager    Negative: Followed a head injury within last 3 days    Negative: Traumatic Brain Injury (TBI) is suspected    Negative: Sinus pain of forehead and yellow or green nasal discharge    Negative: Pregnant    Negative: Unable to walk without " falling    Negative: Stiff neck (can't touch chin to chest)    Negative: Possibility of carbon monoxide exposure    Negative: SEVERE headache, states 'worst headache' of life    Negative: SEVERE headache, sudden-onset (i.e., reaching maximum intensity within seconds to 1 hour)    Negative: Severe pain in one eye    Negative: Loss of vision or double vision  (Exception: Same as prior migraines.)    Negative: Patient sounds very sick or weak to the triager    Negative: Fever > 103 F (39.4 C)    Negative: Fever > 100.0 F (37.8 C) and has diabetes mellitus or a weak immune system (e.g., HIV positive, cancer chemotherapy, organ transplant, splenectomy, chronic steroids)    Negative: SEVERE headache (e.g., excruciating) and has had severe headaches before    Negative: SEVERE headache and not relieved by pain meds    Negative: SEVERE headache and vomiting    Negative: SEVERE headache and fever    Negative: Fever present > 3 days (72 hours)    Negative: New headache and weak immune system (e.g., HIV positive, cancer chemo, splenectomy, organ transplant, chronic steroids)    Protocols used: HEADACHE-A-OH

## 2023-07-24 ENCOUNTER — OFFICE VISIT (OUTPATIENT)
Dept: FAMILY MEDICINE | Facility: OTHER | Age: 43
End: 2023-07-24
Payer: COMMERCIAL

## 2023-07-24 VITALS
RESPIRATION RATE: 16 BRPM | BODY MASS INDEX: 37.83 KG/M2 | HEIGHT: 63 IN | WEIGHT: 213.5 LBS | SYSTOLIC BLOOD PRESSURE: 112 MMHG | DIASTOLIC BLOOD PRESSURE: 68 MMHG | HEART RATE: 64 BPM | OXYGEN SATURATION: 97 % | TEMPERATURE: 97.4 F

## 2023-07-24 DIAGNOSIS — G43.001 MIGRAINE WITHOUT AURA AND WITH STATUS MIGRAINOSUS, NOT INTRACTABLE: Primary | ICD-10-CM

## 2023-07-24 PROCEDURE — 99214 OFFICE O/P EST MOD 30 MIN: CPT | Performed by: FAMILY MEDICINE

## 2023-07-24 RX ORDER — SUMATRIPTAN 50 MG/1
50 TABLET, FILM COATED ORAL
Qty: 9 TABLET | Refills: 3 | Status: SHIPPED | OUTPATIENT
Start: 2023-07-24 | End: 2023-11-29

## 2023-07-24 ASSESSMENT — ENCOUNTER SYMPTOMS: HEADACHES: 1

## 2023-07-24 ASSESSMENT — PAIN SCALES - GENERAL: PAINLEVEL: MILD PAIN (3)

## 2023-07-24 NOTE — PATIENT INSTRUCTIONS
Patient Education   Recurring Migraine Headache: Care Instructions  Overview  Migraines are painful, throbbing headaches. They often start on one side of the head. They may cause nausea and vomiting and make you sensitive to light, sound, or smell. Some people may have only a few migraines throughout life. Others have them as often as several times a month.  The goal of treatment is to reduce the number of migraines you have and relieve your symptoms. Even with treatment, you may continue to have migraines. You play an important role in dealing with your headaches. Work on avoiding things that seem to trigger your migraines. When you feel a headache coming on, act quickly to stop it before it gets worse.  Follow-up care is a key part of your treatment and safety. Be sure to make and go to all appointments, and call your doctor if you are having problems. It's also a good idea to know your test results and keep a list of the medicines you take.  How can you care for yourself at home?  Do not drive if you have taken a prescription pain medicine.  Rest in a quiet, dark room until your headache is gone. Close your eyes and try to relax or go to sleep. Do not watch TV or read.  Put a cold, moist cloth or cold pack on the painful area for 10 to 20 minutes at a time. Put a thin cloth between the cold pack and your skin.  Have someone gently massage your neck and shoulders.  Take your medicines exactly as prescribed. Call your doctor if you think you are having a problem with your medicine. You will get more details on the specific medicines your doctor prescribes.  Don't take medicine for headache pain too often. Talk to your doctor if you are taking medicine more than 2 days a week to stop a headache. Taking too much pain medicine can lead to more headaches. These are called medicine-overuse headaches.  To prevent migraines  Keep a headache diary so you can figure out what triggers your headaches. Avoiding triggers may  help you prevent headaches. Record when each headache began, how long it lasted, and what the pain was like. Write down any other symptoms you had with the headache. These may include nausea, flashing lights or dark spots, or sensitivity to bright light or loud noise. Note if the headache occurred near your period. List anything that might have triggered the headache. Triggers may include certain foods (chocolate, cheese, wine) or odors, smoke, bright light, stress, or lack of sleep.  If your doctor has prescribed medicine for your migraines, take it as directed. You may have medicine that you take only when you get a migraine and medicine that you take all the time to help prevent migraines.  If your doctor has prescribed medicine for when you get a headache, take it at the first sign of a migraine, unless your doctor has given you other instructions.  If your doctor has prescribed medicine to prevent migraines, take it exactly as prescribed. Call your doctor if you think you are having a problem with your medicine.  Find healthy ways to deal with stress. Migraines are most common during or right after stressful times. Try finding ways to reduce stress like practicing mindfulness or deep breathing exercises.  Get regular sleep and exercise. But be careful to not push yourself too hard during exercise. It may trigger a headache.  Eat regular meals, and avoid foods and drinks that often trigger migraines. These include chocolate and alcohol, especially red wine and port. Chemicals used in food, such as aspartame and monosodium glutamate (MSG), also can trigger migraines. So can some food additives, such as those found in hot dogs, roldan, cold cuts, aged cheeses, and pickled foods.  Limit caffeine by not drinking too much coffee, tea, or soda. Do not quit caffeine suddenly, because that can also give you migraines.  Do not smoke or allow others to smoke around you. If you need help quitting, talk to your doctor about  "stop-smoking programs and medicines. These can increase your chances of quitting for good.  If you are taking birth control pills or hormone therapy, talk to your doctor about whether they are triggering your migraines.  When should you call for help?   Call 911 anytime you think you may need emergency care. For example, call if:    You have symptoms of a stroke. These may include:  Sudden numbness, tingling, weakness, or loss of movement in your face, arm, or leg, especially on only one side of your body.  Sudden vision changes.  Sudden trouble speaking.  Sudden confusion or trouble understanding simple statements.  Sudden problems with walking or balance.  A sudden, severe headache that is different from past headaches.   Call your doctor now or seek immediate medical care if:    You develop a fever and a stiff neck.     You have new nausea and vomiting, or you cannot keep down food or liquids.   Watch closely for changes in your health, and be sure to contact your doctor if:    You have a headache that does not get better within 1 or 2 days.     Your headaches get worse or happen more often.   Where can you learn more?  Go to https://www.[employer].net/patiented  Enter V975 in the search box to learn more about \"Recurring Migraine Headache: Care Instructions.\"  Current as of: August 25, 2022               Content Version: 13.7    7917-0868 [employer].   Care instructions adapted under license by your healthcare professional. If you have questions about a medical condition or this instruction, always ask your healthcare professional. [employer], [employer] disclaims any warranty or liability for your use of this information.           "

## 2023-07-24 NOTE — TELEPHONE ENCOUNTER
Called and spoke with patient.   She says her migraine subsided over the weekend. However, she woke up today with a headache. She does not want this to progress to a migraine like she had for the last 2 weeks.     Appointment scheduled for today.     Halima RAMOS, RN  [facility]

## 2023-07-24 NOTE — TELEPHONE ENCOUNTER
Was directed to UC last week. If still needs urgent work in, I can see her at 11:30 rounding.  Rosetta Nicole MD

## 2023-07-24 NOTE — PROGRESS NOTES
Assessment & Plan       ICD-10-CM    1. Migraine without aura and with status migrainosus, not intractable  G43.001 SUMAtriptan (IMITREX) 50 MG tablet        Patient had a terrible migraine over the last 4 days and had appeared to be improving and then again worsened.  It has been difficult for her to break though she has not been using any rescue medications and did not go to the ER or urgent care.  She has had significant improvements in her migraines since her hysterectomy but did not realize that she was still having the hormonal ups and downs with her ovaries remaining and is still seeing some cyclical effects about monthly.  We did talk about the risk benefits of hormonal treatment even though she does not need these for her menstrual cycles but she is still a smoker and does have increased risk given her age and so at this time she would like to focus on just rescue medications and see if there is a need for controllers.  We advised her to follow-up migraine diary and look for triggers other than her menstrual cycle to see if there is other things we can do and report how frequently she is needing her Imitrex because if she is having more than need for 9 rescues a month we likely should be on preventative medications.  Offered injections and rescue medications here in the clinic today though she does not have a  and so we did have to settle for at home medications today      29 minutes spent by me on the date of the encounter doing chart review, history and exam, documentation and further activities per the note           Rosetta Nicole MD, MD  [facility] is a 43 year [facility], presenting for the following health issues:  Headache      7/24/2023    11:06 AM   Additional Questions   Roomed by Iraida     Headache     History of Present Illness       Headaches:   Since the patient's last clinic visit, headaches are: worsened  The patient is getting headaches:  Every 2  "to 3 weeks  She is not able to do normal daily activities when she has a migraine.  The patient is taking the following rescue/relief medications:  Ibuprofen (Advil, Motrin), Naproxyn (Aleve), Tylenol and Excedrin   Patient states \"I get only a small amount of relief\" from the rescue/relief medications.   The patient is taking the following medications to prevent migraines:  No medications to prevent migraines  In the past 4 weeks, the patient has gone to an Urgent Care or Emergency Room 0 times times due to headaches.    She eats 2-3 servings of fruits and vegetables daily.She consumes 0 sweetened beverage(s) daily.She exercises with enough effort to increase her heart rate 20 to 29 minutes per day.  She exercises with enough effort to increase her heart rate 4 days per week.   She is taking medications regularly.     Thursday - terrible, Friday better, but today is back. 3/10 at this time.  Aleve, ibuprofen, tylenol, sleep, ice, heat, massage from sig other, hasn't yet been to chiro, but this does often help.        Review of Systems   Neurological:  Positive for headaches.      Constitutional, HEENT, cardiovascular, pulmonary, GI, , musculoskeletal, neuro, skin, endocrine and psych systems are negative, except as otherwise noted.      Objective    /68   Pulse 64   Temp 97.4  F (36.3  C) (Temporal)   Resp 16   Ht 1.61 m (5' 3.39\")   Wt 96.8 kg (213 lb 8 oz)   LMP 01/19/2023   SpO2 97%   BMI 37.36 kg/m    Body mass index is 37.36 kg/m .  Physical Exam   GENERAL: healthy, alert and no distress  EYES: Eyes grossly normal to inspection, PERRL and conjunctivae and sclerae normal  HENT: ear canals and TM's normal, nose and mouth without ulcers or lesions  NECK: no adenopathy, no asymmetry, masses, or scars and thyroid normal to palpation  RESP: lungs clear to auscultation - no rales, rhonchi or wheezes  CV: regular rate and rhythm, normal S1 S2, no S3 or S4, no murmur, click or rub, no peripheral edema " and peripheral pulses strong  ABDOMEN: soft, nontender, no hepatosplenomegaly, no masses and bowel sounds normal  MS: no gross musculoskeletal defects noted, no edema  SKIN: no suspicious lesions or rashes  NEURO: Normal strength and tone, mentation intact and speech normal. CN II-XII grossly intact  PSYCH: mentation appears normal, affect normal/bright

## 2023-08-01 ENCOUNTER — OFFICE VISIT (OUTPATIENT)
Dept: SURGERY | Facility: CLINIC | Age: 43
End: 2023-08-01
Attending: FAMILY MEDICINE
Payer: COMMERCIAL

## 2023-08-01 VITALS
BODY MASS INDEX: 37.74 KG/M2 | SYSTOLIC BLOOD PRESSURE: 116 MMHG | TEMPERATURE: 96.9 F | DIASTOLIC BLOOD PRESSURE: 78 MMHG | WEIGHT: 213 LBS | HEIGHT: 63 IN

## 2023-08-01 DIAGNOSIS — N63.10 MASS OF RIGHT BREAST, UNSPECIFIED QUADRANT: Primary | ICD-10-CM

## 2023-08-01 PROCEDURE — 99204 OFFICE O/P NEW MOD 45 MIN: CPT | Performed by: SURGERY

## 2023-08-01 ASSESSMENT — PAIN SCALES - GENERAL: PAINLEVEL: NO PAIN (0)

## 2023-08-01 NOTE — PROGRESS NOTES
Assessment & Plan   Problem List Items Addressed This Visit    None  Visit Diagnoses       Mass of right breast, unspecified quadrant    -  Primary    Relevant Orders    US Breast Right Limited 1-3 Quadrants    MA Diagnostic Digital Right    BMI 37.0-37.9, adult               44 yo F with right breast fibroadenoma and mastalgia  -Discussed pathophys of fibroadenoma.  These are common benign breast masses. They do not need to be remove unless it is causing symptoms (pain), increasing in size, and anxiety.    -Discussed pre-op core-needle biopsy prior to going for an excisional biopsy vs going straight to excisional biopsy.  Explained risks, benefits of each.  The later being that if the final pathology is an invasive cancer - pt will need a 2nd surgery for likely margins and kerri biopsy; worse case scenerio is missing the neoadjuvant chemotherapy window if is it a triple negative or HER2+.  This is quite rare especially after reviewing imaging.   -Pt is understanding the above and wishes to proceed with re-evaluation of imaging in 6 months before deciding the next step.    -Will call pt with result  -Discussed ddx of mastalgia (dense breast, caffiene, chocolate, MSK related, supportive bras  -Pt to let me know to get imaging sooner if pain persist or worsens.     [facility] DO Elliott FACOS        40 minutes spent by me on the date of the encounter doing chart review, history and exam, documentation and further activities per the note    No follow-ups on file.    FelipeLex Elliott MD  [facility] 43 [facility], presenting for the following health issues:  Consult    Right breast tenderness noted recently in July diagnostic mammogram  Significant self hx of benign breast cysts - bilateral; thus often gets call back after her screening mammogram  Tenderness started July 2023; thus prompted a mammogram  Noted a cyst and noted fibroadenoma on mammogram  Pain still there  "but not as tender  Hx of hysterectomy in Jan; so does not know when she will menstrate but still has both ovaries.    Cannot feel this fibroadenoma  No nipple discharge; no dimpling of skin.    Paternal aunt sig for breast cancer; dx in her 40s.  Not in any primary relatives.            Review of Systems   Constitutional, HEENT, cardiovascular, pulmonary, GI, , musculoskeletal, neuro, skin, endocrine and psych systems are negative, except as otherwise noted.      Objective    /78   Temp 96.9  F (36.1  C) (Temporal)   Ht 1.61 m (5' 3.39\")   Wt 96.6 kg (213 lb)   LMP 01/19/2023   BMI 37.27 kg/m    Body mass index is 37.27 kg/m .  Physical Exam  Chest:   Breasts:     Right: No swelling, bleeding, nipple discharge or skin change.      Left: Normal. No swelling, bleeding, inverted nipple, mass, nipple discharge or skin change.       Lymphadenopathy:      Upper Body:      Right upper body: No supraclavicular or axillary adenopathy.      Left upper body: No supraclavicular or axillary adenopathy.        MA DIAGNOSTIC RIGHT W/ KACY, US BREAST RIGHT LIMITED 1-3 QUADRANTS -   7/12/2023 1:47 PM     HISTORY: Palpable abnormality in the right breast.     COMPARISON: 12/7/2022, 11/23/2022, and 11/22/2021     BREAST DENSITY: Heterogeneously dense.     FINDINGS:    Right diagnostic mammogram with thoracentesis: At the site of the  palpable abnormality in the right breast there is dense tissue, but no  evidence for mass or architectural distortion. Ultrasound will be  obtained.     Right breast ultrasound: In the 11:00 position 4 cm from the nipple  there is a simple cyst measuring 6 x 3 x 4 mm. In the 11:00 position 3  cm from the nipple there is an oval circumscribed 10 x 4 x 8 mm likely  fibroadenoma or complicated cyst. No correlate is seen for the  palpable abnormality.                                                                      IMPRESSION:  1. No mammographic or sonographic abnormality to correlate with " the  palpable abnormality in the right breast. Further measurement should  be based on clinical scenario.  2. Incidental simple cyst and likely fibroadenoma in the right breast.  Six-month follow-up ultrasound is recommended to ensure stability.      BI-RADS CATEGORY: 3 - Probably Benign.     RECOMMENDED FOLLOW-UP: Six-month follow-up right breast ultrasound.     OMEGA ANTOINE MD

## 2023-08-01 NOTE — LETTER
8/1/2023         RE: Edda Schmidt  [address]  [place] [address]        Dear Colleague,    Thank you for referring your patient, Edda Schmidt, to the [facility]. Please see a copy of my visit note below.      Assessment & Plan  Problem List Items Addressed This Visit    None  Visit Diagnoses       Mass of right breast, unspecified quadrant    -  Primary    Relevant Orders    US Breast Right Limited 1-3 Quadrants    MA Diagnostic Digital Right    BMI 37.0-37.9, adult               42 yo F with right breast fibroadenoma and mastalgia  -Discussed pathophys of fibroadenoma.  These are common benign breast masses. They do not need to be remove unless it is causing symptoms (pain), increasing in size, and anxiety.    -Discussed pre-op core-needle biopsy prior to going for an excisional biopsy vs going straight to excisional biopsy.  Explained risks, benefits of each.  The later being that if the final pathology is an invasive cancer - pt will need a 2nd surgery for likely margins and kerri biopsy; worse case scenerio is missing the neoadjuvant chemotherapy window if is it a triple negative or HER2+.  This is quite rare especially after reviewing imaging.   -Pt is understanding the above and wishes to proceed with re-evaluation of imaging in 6 months before deciding the next step.    -Will call pt with result  -Discussed ddx of mastalgia (dense breast, caffiene, chocolate, MSK related, supportive bras  -Pt to let me know to get imaging sooner if pain persist or worsens.     [facility] DO Elliott FACOS        40 minutes spent by me on the date of the encounter doing chart review, history and exam, documentation and further activities per the note    No follow-ups on file.    FelipeLex Elliott MD  [facility]          Sheryl Vernon is a 43 year old, presenting for the following health issues:  Consult    Right breast tenderness noted recently in July  "diagnostic mammogram  Significant self hx of benign breast cysts - bilateral; thus often gets call back after her screening mammogram  Tenderness started July 2023; thus prompted a mammogram  Noted a cyst and noted fibroadenoma on mammogram  Pain still there but not as tender  Hx of hysterectomy in Jan; so does not know when she will menstrate but still has both ovaries.    Cannot feel this fibroadenoma  No nipple discharge; no dimpling of skin.    Paternal aunt sig for breast cancer; dx in her 40s.  Not in any primary relatives.            Review of Systems   Constitutional, HEENT, cardiovascular, pulmonary, GI, , musculoskeletal, neuro, skin, endocrine and psych systems are negative, except as otherwise noted.      Objective   /78   Temp 96.9  F (36.1  C) (Temporal)   Ht 1.61 m (5' 3.39\")   Wt 96.6 kg (213 lb)   LMP 01/19/2023   BMI 37.27 kg/m    Body mass index is 37.27 kg/m .  Physical Exam  Chest:   Breasts:     Right: No swelling, bleeding, nipple discharge or skin change.      Left: Normal. No swelling, bleeding, inverted nipple, mass, nipple discharge or skin change.       Lymphadenopathy:      Upper Body:      Right upper body: No supraclavicular or axillary adenopathy.      Left upper body: No supraclavicular or axillary adenopathy.        MA DIAGNOSTIC RIGHT W/ KACY, US BREAST RIGHT LIMITED 1-3 QUADRANTS -   7/12/2023 1:47 PM     HISTORY: Palpable abnormality in the right breast.     COMPARISON: 12/7/2022, 11/23/2022, and 11/22/2021     BREAST DENSITY: Heterogeneously dense.     FINDINGS:    Right diagnostic mammogram with thoracentesis: At the site of the  palpable abnormality in the right breast there is dense tissue, but no  evidence for mass or architectural distortion. Ultrasound will be  obtained.     Right breast ultrasound: In the 11:00 position 4 cm from the nipple  there is a simple cyst measuring 6 x 3 x 4 mm. In the 11:00 position 3  cm from the nipple there is an oval " circumscribed 10 x 4 x 8 mm likely  fibroadenoma or complicated cyst. No correlate is seen for the  palpable abnormality.                                                                      IMPRESSION:  1. No mammographic or sonographic abnormality to correlate with the  palpable abnormality in the right breast. Further measurement should  be based on clinical scenario.  2. Incidental simple cyst and likely fibroadenoma in the right breast.  Six-month follow-up ultrasound is recommended to ensure stability.      BI-RADS CATEGORY: 3 - Probably Benign.     RECOMMENDED FOLLOW-UP: Six-month follow-up right breast ultrasound.     OMEGA ANTOINE MD             Again, thank you for allowing me to participate in the care of your patient.        Sincerely,        Alaina Elliott MD

## 2023-08-19 ENCOUNTER — HOSPITAL ENCOUNTER (EMERGENCY)
Facility: CLINIC | Age: 43
Discharge: HOME | End: 2023-08-19
Attending: EMERGENCY MEDICINE | Admitting: EMERGENCY MEDICINE
Payer: COMMERCIAL

## 2023-08-19 VITALS
RESPIRATION RATE: 16 BRPM | BODY MASS INDEX: 36.74 KG/M2 | SYSTOLIC BLOOD PRESSURE: 113 MMHG | WEIGHT: 210 LBS | HEART RATE: 58 BPM | DIASTOLIC BLOOD PRESSURE: 56 MMHG | OXYGEN SATURATION: 98 % | TEMPERATURE: 98.2 F

## 2023-08-19 DIAGNOSIS — G44.209 ACUTE NON INTRACTABLE TENSION-TYPE HEADACHE: ICD-10-CM

## 2023-08-19 PROCEDURE — 99284 EMERGENCY DEPT VISIT MOD MDM: CPT | Mod: 25 | Performed by: EMERGENCY MEDICINE

## 2023-08-19 PROCEDURE — 93010 ELECTROCARDIOGRAM REPORT: CPT | Performed by: EMERGENCY MEDICINE

## 2023-08-19 PROCEDURE — 96375 TX/PRO/DX INJ NEW DRUG ADDON: CPT | Performed by: EMERGENCY MEDICINE

## 2023-08-19 PROCEDURE — 93005 ELECTROCARDIOGRAM TRACING: CPT | Performed by: EMERGENCY MEDICINE

## 2023-08-19 PROCEDURE — 96361 HYDRATE IV INFUSION ADD-ON: CPT | Performed by: EMERGENCY MEDICINE

## 2023-08-19 PROCEDURE — 258N000003 HC RX IP 258 OP 636: Performed by: EMERGENCY MEDICINE

## 2023-08-19 PROCEDURE — 250N000011 HC RX IP 250 OP 636: Mod: JZ | Performed by: EMERGENCY MEDICINE

## 2023-08-19 PROCEDURE — 20553 NJX 1/MLT TRIGGER POINTS 3/>: CPT | Performed by: EMERGENCY MEDICINE

## 2023-08-19 PROCEDURE — 96374 THER/PROPH/DIAG INJ IV PUSH: CPT | Performed by: EMERGENCY MEDICINE

## 2023-08-19 RX ORDER — DROPERIDOL 2.5 MG/ML
1.25 INJECTION, SOLUTION INTRAMUSCULAR; INTRAVENOUS ONCE
Status: COMPLETED | OUTPATIENT
Start: 2023-08-19 | End: 2023-08-19

## 2023-08-19 RX ORDER — DIPHENHYDRAMINE HYDROCHLORIDE 50 MG/ML
25 INJECTION INTRAMUSCULAR; INTRAVENOUS ONCE
Status: COMPLETED | OUTPATIENT
Start: 2023-08-19 | End: 2023-08-19

## 2023-08-19 RX ADMIN — DIPHENHYDRAMINE HYDROCHLORIDE 25 MG: 50 INJECTION, SOLUTION INTRAMUSCULAR; INTRAVENOUS at 14:47

## 2023-08-19 RX ADMIN — DROPERIDOL 1.25 MG: 2.5 INJECTION, SOLUTION INTRAMUSCULAR; INTRAVENOUS at 14:53

## 2023-08-19 RX ADMIN — SODIUM CHLORIDE 1000 ML: 9 INJECTION, SOLUTION INTRAVENOUS at 14:46

## 2023-08-19 ASSESSMENT — ACTIVITIES OF DAILY LIVING (ADL): ADLS_ACUITY_SCORE: 35

## 2023-08-19 NOTE — ED TRIAGE NOTES
PT has a migraine that started on Thursday. Has hx of migraines. Has been taking Imitrex, tylenol, and ibuprofen, magnesium but has not helped.

## 2023-08-19 NOTE — Clinical Note
Edda Schmidt was seen and treated in our emergency department on 8/19/2023.  She may return to work on 08/22/2023.       If you have any questions or concerns, please don't hesitate to call.      Melchor Alcantar MD
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
## 2023-08-24 DIAGNOSIS — N95.1 MENOPAUSAL SYNDROME (HOT FLASHES): Primary | ICD-10-CM

## 2023-08-24 RX ORDER — ESTRADIOL 0.05 MG/D
1 PATCH, EXTENDED RELEASE TRANSDERMAL
Qty: 12 PATCH | Refills: 3 | Status: SHIPPED | OUTPATIENT
Start: 2023-08-24 | End: 2023-10-16

## 2023-08-24 NOTE — PROGRESS NOTES
Edda and I had a conversation regarding her menopausal symptoms which are leading to headaches.  Hot flashes are interrupting her ability to work and headaches are non focal, varying in location of origin.  She has no history of visual changes or signs that a migraine may be coming on.  She is requesting a trial of hormone replacement.  Pt has had a hysterectomy early in 2023.  She has been to the ER recently and had physical    Suggest she try Vivelle dot and is given phone number for referral to neurology

## 2023-09-02 ENCOUNTER — MYC MEDICAL ADVICE (OUTPATIENT)
Dept: FAMILY MEDICINE | Facility: OTHER | Age: 43
End: 2023-09-02
Payer: COMMERCIAL

## 2023-09-05 NOTE — TELEPHONE ENCOUNTER
Would you do e-visit for weight loss medications, or need virtual?    Halima SENN, RN  [facility] [address]

## 2023-09-05 NOTE — TELEPHONE ENCOUNTER
E-visit is not appropriate for weight loss medications if this is new to the patient.     [facility]

## 2023-09-16 ENCOUNTER — APPOINTMENT (OUTPATIENT)
Dept: GENERAL RADIOLOGY | Facility: HOSPITAL | Age: 43
End: 2023-09-16
Attending: EMERGENCY MEDICINE
Payer: COMMERCIAL

## 2023-09-16 ENCOUNTER — HOSPITAL ENCOUNTER (EMERGENCY)
Facility: HOSPITAL | Age: 43
Discharge: ANOTHER HOSPITAL | End: 2023-09-16
Attending: EMERGENCY MEDICINE | Admitting: EMERGENCY MEDICINE
Payer: COMMERCIAL

## 2023-09-16 VITALS
RESPIRATION RATE: 12 BRPM | OXYGEN SATURATION: 100 % | WEIGHT: 220.24 LBS | DIASTOLIC BLOOD PRESSURE: 91 MMHG | SYSTOLIC BLOOD PRESSURE: 146 MMHG | BODY MASS INDEX: 36.69 KG/M2 | HEART RATE: 56 BPM | HEIGHT: 65 IN | TEMPERATURE: 97.6 F

## 2023-09-16 DIAGNOSIS — S82.852B: ICD-10-CM

## 2023-09-16 LAB
ABO/RH(D): NORMAL
ALBUMIN SERPL BCG-MCNC: 4.1 G/DL (ref 3.5–5.2)
ALP SERPL-CCNC: 63 U/L (ref 35–104)
ALT SERPL W P-5'-P-CCNC: 11 U/L (ref 0–50)
ANION GAP SERPL CALCULATED.3IONS-SCNC: 11 MMOL/L (ref 7–15)
ANTIBODY SCREEN: NEGATIVE
AST SERPL W P-5'-P-CCNC: 17 U/L (ref 0–45)
BASOPHILS # BLD AUTO: 0 10E3/UL (ref 0–0.2)
BASOPHILS NFR BLD AUTO: 0 %
BILIRUB SERPL-MCNC: 0.2 MG/DL
BUN SERPL-MCNC: 9 MG/DL (ref 6–20)
CALCIUM SERPL-MCNC: 8.7 MG/DL (ref 8.6–10)
CHLORIDE SERPL-SCNC: 102 MMOL/L (ref 98–107)
CREAT SERPL-MCNC: 0.72 MG/DL (ref 0.51–0.95)
DEPRECATED HCO3 PLAS-SCNC: 26 MMOL/L (ref 22–29)
EGFRCR SERPLBLD CKD-EPI 2021: >90 ML/MIN/1.73M2
EOSINOPHIL # BLD AUTO: 0.1 10E3/UL (ref 0–0.7)
EOSINOPHIL NFR BLD AUTO: 1 %
ERYTHROCYTE [DISTWIDTH] IN BLOOD BY AUTOMATED COUNT: 13.1 % (ref 10–15)
GLUCOSE SERPL-MCNC: 102 MG/DL (ref 70–99)
HCG SERPL QL: NEGATIVE
HCT VFR BLD AUTO: 38.7 % (ref 35–47)
HGB BLD-MCNC: 12.9 G/DL (ref 11.7–15.7)
IMM GRANULOCYTES # BLD: 0 10E3/UL
IMM GRANULOCYTES NFR BLD: 0 %
INR PPP: 1.03 (ref 0.85–1.15)
LACTATE SERPL-SCNC: 1.2 MMOL/L (ref 0.7–2)
LYMPHOCYTES # BLD AUTO: 2.6 10E3/UL (ref 0.8–5.3)
LYMPHOCYTES NFR BLD AUTO: 22 %
MCH RBC QN AUTO: 28.8 PG (ref 26.5–33)
MCHC RBC AUTO-ENTMCNC: 33.3 G/DL (ref 31.5–36.5)
MCV RBC AUTO: 86 FL (ref 78–100)
MONOCYTES # BLD AUTO: 0.7 10E3/UL (ref 0–1.3)
MONOCYTES NFR BLD AUTO: 5 %
NEUTROPHILS # BLD AUTO: 8.7 10E3/UL (ref 1.6–8.3)
NEUTROPHILS NFR BLD AUTO: 72 %
NRBC # BLD AUTO: 0 10E3/UL
NRBC BLD AUTO-RTO: 0 /100
PLATELET # BLD AUTO: 252 10E3/UL (ref 150–450)
POTASSIUM SERPL-SCNC: 3.5 MMOL/L (ref 3.4–5.3)
PROT SERPL-MCNC: 6.7 G/DL (ref 6.4–8.3)
RBC # BLD AUTO: 4.48 10E6/UL (ref 3.8–5.2)
SODIUM SERPL-SCNC: 139 MMOL/L (ref 136–145)
SPECIMEN EXPIRATION DATE: NORMAL
WBC # BLD AUTO: 12.1 10E3/UL (ref 4–11)

## 2023-09-16 PROCEDURE — 27818 TREATMENT OF ANKLE FRACTURE: CPT | Mod: 54 | Performed by: EMERGENCY MEDICINE

## 2023-09-16 PROCEDURE — 99291 CRITICAL CARE FIRST HOUR: CPT | Mod: 57 | Performed by: EMERGENCY MEDICINE

## 2023-09-16 PROCEDURE — 250N000011 HC RX IP 250 OP 636: Mod: JZ | Performed by: EMERGENCY MEDICINE

## 2023-09-16 PROCEDURE — 96375 TX/PRO/DX INJ NEW DRUG ADDON: CPT | Mod: XU

## 2023-09-16 PROCEDURE — 72170 X-RAY EXAM OF PELVIS: CPT

## 2023-09-16 PROCEDURE — 83605 ASSAY OF LACTIC ACID: CPT | Performed by: EMERGENCY MEDICINE

## 2023-09-16 PROCEDURE — 84703 CHORIONIC GONADOTROPIN ASSAY: CPT | Performed by: EMERGENCY MEDICINE

## 2023-09-16 PROCEDURE — 250N000011 HC RX IP 250 OP 636

## 2023-09-16 PROCEDURE — 258N000003 HC RX IP 258 OP 636: Performed by: EMERGENCY MEDICINE

## 2023-09-16 PROCEDURE — 80053 COMPREHEN METABOLIC PANEL: CPT | Performed by: EMERGENCY MEDICINE

## 2023-09-16 PROCEDURE — 86850 RBC ANTIBODY SCREEN: CPT | Performed by: EMERGENCY MEDICINE

## 2023-09-16 PROCEDURE — 73560 X-RAY EXAM OF KNEE 1 OR 2: CPT | Mod: LT

## 2023-09-16 PROCEDURE — 73590 X-RAY EXAM OF LOWER LEG: CPT | Mod: LT

## 2023-09-16 PROCEDURE — 99152 MOD SED SAME PHYS/QHP 5/>YRS: CPT | Performed by: EMERGENCY MEDICINE

## 2023-09-16 PROCEDURE — 85025 COMPLETE CBC W/AUTO DIFF WBC: CPT | Performed by: EMERGENCY MEDICINE

## 2023-09-16 PROCEDURE — 96365 THER/PROPH/DIAG IV INF INIT: CPT | Mod: XU

## 2023-09-16 PROCEDURE — 86901 BLOOD TYPING SEROLOGIC RH(D): CPT | Performed by: EMERGENCY MEDICINE

## 2023-09-16 PROCEDURE — 85610 PROTHROMBIN TIME: CPT | Performed by: EMERGENCY MEDICINE

## 2023-09-16 PROCEDURE — 73610 X-RAY EXAM OF ANKLE: CPT | Mod: LT

## 2023-09-16 PROCEDURE — 999N000065 XR ANKLE PORT LEFT 2 VIEWS: Mod: LT

## 2023-09-16 PROCEDURE — 96376 TX/PRO/DX INJ SAME DRUG ADON: CPT | Mod: XU

## 2023-09-16 PROCEDURE — 99152 MOD SED SAME PHYS/QHP 5/>YRS: CPT

## 2023-09-16 PROCEDURE — 27818 TREATMENT OF ANKLE FRACTURE: CPT | Mod: LT

## 2023-09-16 PROCEDURE — 999N000157 HC STATISTIC RCP TIME EA 10 MIN

## 2023-09-16 PROCEDURE — 36415 COLL VENOUS BLD VENIPUNCTURE: CPT | Performed by: EMERGENCY MEDICINE

## 2023-09-16 PROCEDURE — 99291 CRITICAL CARE FIRST HOUR: CPT | Mod: 25

## 2023-09-16 RX ORDER — CEFAZOLIN SODIUM 1 G/50ML
INJECTION, SOLUTION INTRAVENOUS
Status: COMPLETED
Start: 2023-09-16 | End: 2023-09-16

## 2023-09-16 RX ORDER — HYDROMORPHONE HYDROCHLORIDE 1 MG/ML
0.5 INJECTION, SOLUTION INTRAMUSCULAR; INTRAVENOUS; SUBCUTANEOUS ONCE
Status: COMPLETED | OUTPATIENT
Start: 2023-09-16 | End: 2023-09-16

## 2023-09-16 RX ORDER — KETOROLAC TROMETHAMINE 30 MG/ML
30 INJECTION, SOLUTION INTRAMUSCULAR; INTRAVENOUS ONCE
Status: COMPLETED | OUTPATIENT
Start: 2023-09-16 | End: 2023-09-16

## 2023-09-16 RX ORDER — PROPOFOL 10 MG/ML
100 INJECTION, EMULSION INTRAVENOUS ONCE
Status: COMPLETED | OUTPATIENT
Start: 2023-09-16 | End: 2023-09-16

## 2023-09-16 RX ORDER — CEFAZOLIN SODIUM 2 G/100ML
2 INJECTION, SOLUTION INTRAVENOUS ONCE
Status: DISCONTINUED | OUTPATIENT
Start: 2023-09-16 | End: 2023-09-17 | Stop reason: HOSPADM

## 2023-09-16 RX ADMIN — CEFAZOLIN SODIUM 2 G: 1 INJECTION, SOLUTION INTRAVENOUS at 22:15

## 2023-09-16 RX ADMIN — SODIUM CHLORIDE 1000 ML: 9 INJECTION, SOLUTION INTRAVENOUS at 21:42

## 2023-09-16 RX ADMIN — HYDROMORPHONE HYDROCHLORIDE 0.5 MG: 1 INJECTION, SOLUTION INTRAMUSCULAR; INTRAVENOUS; SUBCUTANEOUS at 21:43

## 2023-09-16 RX ADMIN — KETOROLAC TROMETHAMINE 30 MG: 30 INJECTION, SOLUTION INTRAMUSCULAR; INTRAVENOUS at 22:28

## 2023-09-16 RX ADMIN — PROPOFOL 100 MG: 10 INJECTION, EMULSION INTRAVENOUS at 22:45

## 2023-09-16 RX ADMIN — HYDROMORPHONE HYDROCHLORIDE 1 MG: 1 INJECTION, SOLUTION INTRAMUSCULAR; INTRAVENOUS; SUBCUTANEOUS at 22:09

## 2023-09-16 ASSESSMENT — ACTIVITIES OF DAILY LIVING (ADL): ADLS_ACUITY_SCORE: 35

## 2023-09-17 NOTE — ED NOTES
Patient sent with 's license and cell phone and personal blanket. Friend took patient's jewelry, clothing, shoes and helmet.

## 2023-09-17 NOTE — ED NOTES
Pt currently crying in pain, spoke to Dr. Campos, received verbal orders for 1 mg of Dilaudid, order placed.

## 2023-09-17 NOTE — ED NOTES
At 2249 patient's O2 sats dropped to low 80's. RT provided jaw thrust to open airway and O2 sats did not increase. Provided ventilation support with BVM and O2 sats came up to 100%. Patient is starting to wake after propofol.
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
## 2023-09-17 NOTE — ED NOTES
[facility] here and report given to Moustapha. Patient was able to void on bed pan prior to being transferred over to EMS cot. Patient's friend assisted this RN with haim-care and new placement of chux under patient.

## 2023-09-19 ENCOUNTER — MYC MEDICAL ADVICE (OUTPATIENT)
Dept: FAMILY MEDICINE | Facility: OTHER | Age: 43
End: 2023-09-19
Payer: COMMERCIAL

## 2023-09-19 ASSESSMENT — ENCOUNTER SYMPTOMS
CHILLS: 0
COUGH: 0
FEVER: 0
SHORTNESS OF BREATH: 0

## 2023-09-20 NOTE — TELEPHONE ENCOUNTER
Prescribed by Dr. Moses (documentation under orders only encounter 8/24/23). Has migraines with aura, so will have to determine risks/benefits to increased dosing.  Rosetta Nicole MD
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
## 2023-09-25 ENCOUNTER — OFFICE VISIT (OUTPATIENT)
Dept: PODIATRY | Facility: CLINIC | Age: 43
End: 2023-09-25
Payer: COMMERCIAL

## 2023-09-25 VITALS
BODY MASS INDEX: 36.65 KG/M2 | DIASTOLIC BLOOD PRESSURE: 70 MMHG | WEIGHT: 220 LBS | SYSTOLIC BLOOD PRESSURE: 110 MMHG | HEIGHT: 65 IN | TEMPERATURE: 97.3 F

## 2023-09-25 DIAGNOSIS — S82.852A CLOSED TRIMALLEOLAR FRACTURE OF LEFT ANKLE, INITIAL ENCOUNTER: Primary | ICD-10-CM

## 2023-09-25 PROCEDURE — 99203 OFFICE O/P NEW LOW 30 MIN: CPT | Performed by: PODIATRIST

## 2023-09-25 RX ORDER — OXYCODONE HYDROCHLORIDE 5 MG/1
5 TABLET ORAL EVERY 6 HOURS PRN
Qty: 26 TABLET | Refills: 0 | Status: SHIPPED | OUTPATIENT
Start: 2023-09-25 | End: 2023-10-03

## 2023-09-25 RX ORDER — CALCIUM CARBONATE 500 MG/1
1 TABLET, CHEWABLE ORAL 3 TIMES DAILY
COMMUNITY
Start: 2023-09-18 | End: 2023-11-29

## 2023-09-25 RX ORDER — CEPHALEXIN 500 MG/1
500 CAPSULE ORAL 4 TIMES DAILY
Qty: 40 CAPSULE | Refills: 0 | Status: SHIPPED | OUTPATIENT
Start: 2023-09-25 | End: 2023-10-16

## 2023-09-25 ASSESSMENT — PAIN SCALES - GENERAL: PAINLEVEL: MODERATE PAIN (5)
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
## 2023-09-27 NOTE — TELEPHONE ENCOUNTER
We or they could manage. Though given the risks involved with estrogen and migraines with aura, I would feel more confident if we had this conversation and discussed alternatives first. Please schedule as she feels appropriate  Rosetta Nicole MD

## 2023-09-27 NOTE — TELEPHONE ENCOUNTER
Dr. Moses is no longer working with the  OB/GYN group.  Pt is wondering if her PCP, Dr. Nicole, is willing to manage and continue prescribing the estradiol patches for her.    Routing to Dr. Nicole.    Sheri Addison RN

## 2023-10-02 ENCOUNTER — MYC MEDICAL ADVICE (OUTPATIENT)
Dept: PODIATRY | Facility: CLINIC | Age: 43
End: 2023-10-02
Payer: COMMERCIAL

## 2023-10-02 NOTE — TELEPHONE ENCOUNTER
Ok for work in prior, though she can be offered ob/gyn as well if it is easier/better for her  Rosetta Nicole MD

## 2023-10-02 NOTE — TELEPHONE ENCOUNTER
Patient called to schedule virtual appointment.  Next available is 12/7.  Patient is asking to be seen sooner.  She would like it to be virtual.        Ok to use same day and be virtual?

## 2023-10-03 ENCOUNTER — ANCILLARY PROCEDURE (OUTPATIENT)
Dept: GENERAL RADIOLOGY | Facility: CLINIC | Age: 43
End: 2023-10-03
Attending: PODIATRIST
Payer: COMMERCIAL

## 2023-10-03 ENCOUNTER — OFFICE VISIT (OUTPATIENT)
Dept: PODIATRY | Facility: CLINIC | Age: 43
End: 2023-10-03
Payer: COMMERCIAL

## 2023-10-03 VITALS
SYSTOLIC BLOOD PRESSURE: 120 MMHG | HEIGHT: 65 IN | DIASTOLIC BLOOD PRESSURE: 78 MMHG | WEIGHT: 220 LBS | TEMPERATURE: 97.1 F | BODY MASS INDEX: 36.65 KG/M2

## 2023-10-03 DIAGNOSIS — S82.852A CLOSED TRIMALLEOLAR FRACTURE OF LEFT ANKLE, INITIAL ENCOUNTER: ICD-10-CM

## 2023-10-03 DIAGNOSIS — S82.852A CLOSED TRIMALLEOLAR FRACTURE OF LEFT ANKLE, INITIAL ENCOUNTER: Primary | ICD-10-CM

## 2023-10-03 PROCEDURE — 99213 OFFICE O/P EST LOW 20 MIN: CPT | Performed by: PODIATRIST

## 2023-10-03 PROCEDURE — 73610 X-RAY EXAM OF ANKLE: CPT | Mod: TC | Performed by: RADIOLOGY

## 2023-10-03 RX ORDER — FAMOTIDINE 20 MG
1000 TABLET ORAL DAILY
COMMUNITY
Start: 2023-09-18 | End: 2023-11-29

## 2023-10-03 RX ORDER — OXYCODONE HYDROCHLORIDE 5 MG/1
5 TABLET ORAL EVERY 6 HOURS PRN
Qty: 14 TABLET | Refills: 0 | Status: SHIPPED | OUTPATIENT
Start: 2023-10-03 | End: 2023-10-17

## 2023-10-03 ASSESSMENT — PAIN SCALES - GENERAL: PAINLEVEL: MILD PAIN (3)
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
## 2023-10-03 NOTE — TELEPHONE ENCOUNTER
"Patient's form was filled out, reviewed, and signed by provider.      Form was faxed to the designated fax number: [phone]    A copy was sent to scanning.     A copy was placed in the lower level \"faxed\" file/drawer.      Patient was informed via [employer].     Bette Hurt RN   [facility]    "
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
## 2023-10-03 NOTE — TELEPHONE ENCOUNTER
Unable to reach patient via phone. Left message to return call to clinic. Please schedule patient for telephone visit in next 15 minute opening with Dr. Collins.    Denisa Argueta MA 10/3/2023 3:09 PM

## 2023-10-03 NOTE — TELEPHONE ENCOUNTER
Reason for Call:  Form, our goal is to have forms completed with 72 hours, however, some forms may require a visit or additional information.    Type of letter, form or note:  [facility]    Who is the form from?: [facility]     Where did the form come from: Patient or family brought in       What clinic location was the form placed at?: [facility]    Where the form was placed: Given to MA/RN    What number is listed as a contact on the form?: [phone]    Bette Hurt RN   [facility]

## 2023-10-03 NOTE — TELEPHONE ENCOUNTER
Patient can be put into a 15min time spot for a telephone visit with me.    Veronica Collins, DO

## 2023-10-04 NOTE — TELEPHONE ENCOUNTER
Copy of form sent to address:  [address] [place] [address] as requested by patient. See MyChart encounter.  Iona Casey RN on 10/4/2023 at 9:33 AM

## 2023-10-13 ENCOUNTER — TELEPHONE (OUTPATIENT)
Dept: PODIATRY | Facility: CLINIC | Age: 43
End: 2023-10-13
Payer: COMMERCIAL

## 2023-10-13 NOTE — TELEPHONE ENCOUNTER
Reason for Call:  Form, our goal is to have forms completed with 72 hours, however, some forms may require a visit or additional information.    Type of letter, form or note:  disability    Who is the form from?: Insurance comp    Where did the form come from: form was faxed in    What clinic location was the form placed at?: [facility]    Where the form was placed:  Dr. Pendleton  Box/Folder    What number is listed as a contact on the form?:  [employer] fax [phone]       Additional comments:      Call taken on 10/13/2023 at 7:14 AM by Rosetta Calloway

## 2023-10-13 NOTE — TELEPHONE ENCOUNTER
Forms filled out to the best of my knowledge. Forms faxed to [facility] at [phone]. Copy made and sent to scanning. Original copy in drawer at nurse's station in upper level.

## 2023-10-16 ENCOUNTER — MYC MEDICAL ADVICE (OUTPATIENT)
Dept: OBGYN | Facility: CLINIC | Age: 43
End: 2023-10-16

## 2023-10-16 ENCOUNTER — VIRTUAL VISIT (OUTPATIENT)
Dept: OBGYN | Facility: OTHER | Age: 43
End: 2023-10-16
Payer: COMMERCIAL

## 2023-10-16 DIAGNOSIS — N95.1 MENOPAUSAL SYNDROME (HOT FLASHES): Primary | ICD-10-CM

## 2023-10-16 DIAGNOSIS — G43.829 MENSTRUAL MIGRAINE WITHOUT STATUS MIGRAINOSUS, NOT INTRACTABLE: ICD-10-CM

## 2023-10-16 DIAGNOSIS — L65.9 HAIR LOSS: ICD-10-CM

## 2023-10-16 PROCEDURE — 99214 OFFICE O/P EST MOD 30 MIN: CPT | Mod: 95 | Performed by: OBSTETRICS & GYNECOLOGY

## 2023-10-16 RX ORDER — ESTRADIOL 0.05 MG/D
1 PATCH, EXTENDED RELEASE TRANSDERMAL
Qty: 24 PATCH | Refills: 3 | Status: SHIPPED | OUTPATIENT
Start: 2023-10-16 | End: 2024-09-11
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
## 2023-10-16 NOTE — PATIENT INSTRUCTIONS
Please call if you any questions.    [facility]  [address] [place]   [address]  [phone]        Veronica Collins,

## 2023-10-17 ENCOUNTER — TELEPHONE (OUTPATIENT)
Dept: PODIATRY | Facility: CLINIC | Age: 43
End: 2023-10-17

## 2023-10-17 ENCOUNTER — LAB (OUTPATIENT)
Dept: LAB | Facility: CLINIC | Age: 43
End: 2023-10-17
Payer: COMMERCIAL

## 2023-10-17 ENCOUNTER — OFFICE VISIT (OUTPATIENT)
Dept: PODIATRY | Facility: CLINIC | Age: 43
End: 2023-10-17
Payer: COMMERCIAL

## 2023-10-17 VITALS
HEIGHT: 65 IN | SYSTOLIC BLOOD PRESSURE: 120 MMHG | DIASTOLIC BLOOD PRESSURE: 82 MMHG | BODY MASS INDEX: 36.65 KG/M2 | WEIGHT: 220 LBS

## 2023-10-17 DIAGNOSIS — L65.9 HAIR LOSS: ICD-10-CM

## 2023-10-17 DIAGNOSIS — S82.852A CLOSED TRIMALLEOLAR FRACTURE OF LEFT ANKLE, INITIAL ENCOUNTER: Primary | ICD-10-CM

## 2023-10-17 LAB
FSH SERPL IRP2-ACNC: 3.6 MIU/ML
TSH SERPL DL<=0.005 MIU/L-ACNC: 1.15 UIU/ML (ref 0.3–4.2)

## 2023-10-17 PROCEDURE — 36415 COLL VENOUS BLD VENIPUNCTURE: CPT

## 2023-10-17 PROCEDURE — 84443 ASSAY THYROID STIM HORMONE: CPT

## 2023-10-17 PROCEDURE — 83001 ASSAY OF GONADOTROPIN (FSH): CPT

## 2023-10-17 PROCEDURE — 99213 OFFICE O/P EST LOW 20 MIN: CPT | Performed by: PODIATRIST

## 2023-10-17 RX ORDER — OXYCODONE HYDROCHLORIDE 5 MG/1
5 TABLET ORAL EVERY 6 HOURS PRN
Qty: 14 TABLET | Refills: 0 | Status: SHIPPED | OUTPATIENT
Start: 2023-10-17 | End: 2023-11-29

## 2023-10-17 ASSESSMENT — PAIN SCALES - GENERAL: PAINLEVEL: NO PAIN (0)
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
## 2023-10-17 NOTE — NURSING NOTE
Dispensed 1 Pneumatic Walking Brace, Size Large, with [facility] agreement signed by patient. Violeta Luis CMA, October 17, 2023

## 2023-10-17 NOTE — TELEPHONE ENCOUNTER
Successfully faxed [employer] workability form @ [phone]. Original sent to scanning, copy to patient, copy in Lor's drawer until scanned into chart. Violeta Luis CMA, October 17, 2023
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
## 2023-10-24 ENCOUNTER — TELEPHONE (OUTPATIENT)
Dept: PODIATRY | Facility: CLINIC | Age: 43
End: 2023-10-24
Payer: COMMERCIAL

## 2023-10-24 NOTE — TELEPHONE ENCOUNTER
"Patient's form was filled out, reviewed, and signed by provider.      Form was faxed to the designated fax number: [phone]    A copy was sent to scanning.     A copy was placed in the lower level \"faxed\" file/drawer.      A copy was mailed to patient.      Bette Hurt RN   [facility]    "

## 2023-10-24 NOTE — TELEPHONE ENCOUNTER
Reason for Call:  Form, our goal is to have forms completed with 72 hours, however, some forms may require a visit or additional information.    Type of letter, form or note:  disability    Who is the form from?: Patient    Where did the form come from: form was faxed in    What clinic location was the form placed at?: [facility]     Where the form was placed: Given to MA/RN    What number is listed as a contact on the form?: [phone]       Additional comments: Please mail a copy to Janeen or give to me and I can scan and email it back. Thank you!    Call taken on 10/24/2023 at 10:40 AM by Luis Doherty

## 2023-10-31 ENCOUNTER — OFFICE VISIT (OUTPATIENT)
Dept: PODIATRY | Facility: CLINIC | Age: 43
End: 2023-10-31
Payer: COMMERCIAL

## 2023-10-31 VITALS
DIASTOLIC BLOOD PRESSURE: 70 MMHG | HEIGHT: 65 IN | TEMPERATURE: 97.5 F | BODY MASS INDEX: 36.65 KG/M2 | WEIGHT: 220 LBS | SYSTOLIC BLOOD PRESSURE: 112 MMHG

## 2023-10-31 DIAGNOSIS — S82.852A CLOSED TRIMALLEOLAR FRACTURE OF LEFT ANKLE, INITIAL ENCOUNTER: Primary | ICD-10-CM

## 2023-10-31 PROCEDURE — 99213 OFFICE O/P EST LOW 20 MIN: CPT | Performed by: PODIATRIST

## 2023-10-31 ASSESSMENT — PAIN SCALES - GENERAL: PAINLEVEL: NO PAIN (0)
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
## 2023-10-31 NOTE — LETTER
October 31, 2023      Edda Schmidt  [address]   [place] [address]        To Whom It May Concern:    Edda Schmidt was seen in our clinic. She may return to light duty work on 11/13/23 for up to 4 hours per day and 3 days per week.  No heavy lifting or pushing more than 20 pounds.  Follow up for re-eval end of November.       Sincerely,        Krunal Pendleton DPM
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
## 2023-11-13 ENCOUNTER — TELEPHONE (OUTPATIENT)
Dept: PODIATRY | Facility: CLINIC | Age: 43
End: 2023-11-13
Payer: COMMERCIAL

## 2023-11-13 NOTE — TELEPHONE ENCOUNTER
Reason for Call:  Form, our goal is to have forms completed with 72 hours, however, some forms may require a visit or additional information.    Type of letter, form or note:  Workability Form    Who is the form from?:  Patient's employer  (if other please explain)    Where did the form come from: Patient or family brought in       What clinic location was the form placed at?: [facility]    Where the form was placed:  Dr Pendleton's  Box/Folder    What number is listed as a contact on the form?: [phone] - patient's phone number       Additional comments: Edda would like you to call her when the form is completed - please DO NOT fax to fax number on the form    Call taken on 11/13/2023 at 11:15 AM by Kinsey Wiggins

## 2023-11-13 NOTE — TELEPHONE ENCOUNTER
"Patient's form was filled out, reviewed, and signed by provider.      Form was not faxed per patient request.     A copy was sent to scanning.     A copy was placed in the lower level \"faxed\" file/drawer.      Attempted to call patient to let her know that forms were complete and to see how she would like to receive the completed forms, no answer. Left voicemail and requested patient call back at [phone].       Bette Hurt RN   [facility]  "

## 2023-11-13 NOTE — TELEPHONE ENCOUNTER
Patient returned call and stated that she will  forms and submit them. Forms have been left at the lower level specialty .     Bette Hurt RN   [facility]

## 2023-11-16 ENCOUNTER — THERAPY VISIT (OUTPATIENT)
Dept: PHYSICAL THERAPY | Facility: CLINIC | Age: 43
End: 2023-11-16
Attending: PODIATRIST
Payer: COMMERCIAL

## 2023-11-16 DIAGNOSIS — S82.852A CLOSED TRIMALLEOLAR FRACTURE OF LEFT ANKLE, INITIAL ENCOUNTER: ICD-10-CM

## 2023-11-16 PROCEDURE — 97110 THERAPEUTIC EXERCISES: CPT | Mod: GP

## 2023-11-16 PROCEDURE — 97140 MANUAL THERAPY 1/> REGIONS: CPT | Mod: GP

## 2023-11-16 PROCEDURE — 97161 PT EVAL LOW COMPLEX 20 MIN: CPT | Mod: GP
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
## 2023-11-21 ENCOUNTER — THERAPY VISIT (OUTPATIENT)
Dept: PHYSICAL THERAPY | Facility: CLINIC | Age: 43
End: 2023-11-21
Attending: PODIATRIST
Payer: COMMERCIAL

## 2023-11-21 DIAGNOSIS — S82.852A CLOSED TRIMALLEOLAR FRACTURE OF LEFT ANKLE, INITIAL ENCOUNTER: Primary | ICD-10-CM

## 2023-11-21 PROCEDURE — 97140 MANUAL THERAPY 1/> REGIONS: CPT | Mod: GP

## 2023-11-21 PROCEDURE — 97110 THERAPEUTIC EXERCISES: CPT | Mod: GP

## 2023-11-26 ASSESSMENT — ASTHMA QUESTIONNAIRES: ACT_TOTALSCORE: 25

## 2023-11-28 ENCOUNTER — OFFICE VISIT (OUTPATIENT)
Dept: PODIATRY | Facility: CLINIC | Age: 43
End: 2023-11-28
Payer: COMMERCIAL

## 2023-11-28 ENCOUNTER — ANCILLARY PROCEDURE (OUTPATIENT)
Dept: GENERAL RADIOLOGY | Facility: CLINIC | Age: 43
End: 2023-11-28
Attending: PODIATRIST
Payer: COMMERCIAL

## 2023-11-28 VITALS
HEIGHT: 65 IN | BODY MASS INDEX: 36.65 KG/M2 | WEIGHT: 220 LBS | SYSTOLIC BLOOD PRESSURE: 106 MMHG | DIASTOLIC BLOOD PRESSURE: 70 MMHG

## 2023-11-28 DIAGNOSIS — S82.852A CLOSED TRIMALLEOLAR FRACTURE OF LEFT ANKLE, INITIAL ENCOUNTER: ICD-10-CM

## 2023-11-28 DIAGNOSIS — S82.852A CLOSED TRIMALLEOLAR FRACTURE OF LEFT ANKLE, INITIAL ENCOUNTER: Primary | ICD-10-CM

## 2023-11-28 PROCEDURE — 73610 X-RAY EXAM OF ANKLE: CPT | Mod: TC | Performed by: RADIOLOGY

## 2023-11-28 PROCEDURE — 99213 OFFICE O/P EST LOW 20 MIN: CPT | Performed by: PODIATRIST

## 2023-11-28 ASSESSMENT — PAIN SCALES - GENERAL: PAINLEVEL: NO PAIN (1)

## 2023-11-28 NOTE — PROGRESS NOTES
Form completed, signed and faxed to [phone]. Copy given to patient per her request. Fax confirmation received. Copy sent to scanning.  Saarth/MA  [facility]

## 2023-11-28 NOTE — LETTER
November 28, 2023      Edda Shcmidt  [address]   [place] [facility] [place] [address]        To Whom It May Concern:    Edda Schmidt was seen in our clinic. She may continue restricted work up to 4 hours of standing or walking with a fracture boot now and 4 hours of seated work per 8 hour day.  Up to three days per week.  Will start progressing away from boot in next weeks.  Will expect fewer restrictions in next weeks.        Sincerely,        Krunal Pendleton DPM

## 2023-11-28 NOTE — PATIENT INSTRUCTIONS
Sturdy and reliable ankle braces are most readily available on line, PanelClaw, or CoderBuddy and delivered for around $15-60.  Health insurance often does not pay for this.    Consider:   Ease of application  Volume of the brace and will it fit in your shoes  Does the brace look like it will provide full circumference compression, which is needed if your ankle is swollen and less helpful for chronic deformities.     For recovery of an acute injury, avoid plastic stays on the side of the brace like the Aircast ankle stirrup as they are very bulky and do not fit in most shoes.  Avoid simple neoprene or compression sleeve only braces, as they do not provide much stability or resist re-injury during your recovery.    For acute or recent ankle injuries we often use an ankle brace for compression and stability and mostly to prevent minor re-injuries until the patient is able to regain strength and balance and able to perform one footed toe raise and one footed balance, equal bilateral.  Then discontinue its use as it can encourage the ankle to remain weak over many months.      For long term deformities and chronic instability the more rigid and thus bulkier braces are most often used to provide more help long term or help a ligament that has been elongated after multiple injuries.      Procare double strap ankle support is a good choice for acute ankle sprains or for compression, low volume and moderate stability.    Swede-O ankle brace   Procare lace up ankle brace - are both reasonable choices for long term support.  However they get a bit bulkier.  These are intended for longer term use.    Tri Lock ankle brace or ASO ankle brace are probably the most stable and intended for long term use, chronic ankle instability and most aggressive activity and movements.  They also consume the most volume in your shoes and may be harder to put on for some patients.
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
## 2023-11-29 ENCOUNTER — OFFICE VISIT (OUTPATIENT)
Dept: FAMILY MEDICINE | Facility: OTHER | Age: 43
End: 2023-11-29
Payer: COMMERCIAL

## 2023-11-29 VITALS
SYSTOLIC BLOOD PRESSURE: 122 MMHG | OXYGEN SATURATION: 98 % | WEIGHT: 221 LBS | HEART RATE: 74 BPM | RESPIRATION RATE: 16 BRPM | DIASTOLIC BLOOD PRESSURE: 66 MMHG | HEIGHT: 65 IN | BODY MASS INDEX: 36.82 KG/M2 | TEMPERATURE: 97.2 F

## 2023-11-29 DIAGNOSIS — E66.09 CLASS 2 OBESITY DUE TO EXCESS CALORIES WITHOUT SERIOUS COMORBIDITY WITH BODY MASS INDEX (BMI) OF 36.0 TO 36.9 IN ADULT: Primary | ICD-10-CM

## 2023-11-29 DIAGNOSIS — E66.812 CLASS 2 OBESITY DUE TO EXCESS CALORIES WITHOUT SERIOUS COMORBIDITY WITH BODY MASS INDEX (BMI) OF 36.0 TO 36.9 IN ADULT: Primary | ICD-10-CM

## 2023-11-29 PROCEDURE — 99213 OFFICE O/P EST LOW 20 MIN: CPT | Performed by: FAMILY MEDICINE

## 2023-11-29 PROCEDURE — 91320 SARSCV2 VAC 30MCG TRS-SUC IM: CPT | Performed by: FAMILY MEDICINE

## 2023-11-29 PROCEDURE — 90480 ADMN SARSCOV2 VAC 1/ONLY CMP: CPT | Performed by: FAMILY MEDICINE

## 2023-11-29 RX ORDER — TOPIRAMATE 50 MG/1
50 TABLET, FILM COATED ORAL 2 TIMES DAILY
Qty: 60 TABLET | Refills: 1 | Status: SHIPPED | OUTPATIENT
Start: 2023-11-29 | End: 2023-12-01

## 2023-11-29 ASSESSMENT — PAIN SCALES - GENERAL: PAINLEVEL: NO PAIN (0)
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
## 2023-12-01 ENCOUNTER — TELEPHONE (OUTPATIENT)
Dept: FAMILY MEDICINE | Facility: OTHER | Age: 43
End: 2023-12-01
Payer: COMMERCIAL

## 2023-12-01 DIAGNOSIS — E66.812 CLASS 2 OBESITY DUE TO EXCESS CALORIES WITHOUT SERIOUS COMORBIDITY WITH BODY MASS INDEX (BMI) OF 36.0 TO 36.9 IN ADULT: ICD-10-CM

## 2023-12-01 DIAGNOSIS — E66.09 CLASS 2 OBESITY DUE TO EXCESS CALORIES WITHOUT SERIOUS COMORBIDITY WITH BODY MASS INDEX (BMI) OF 36.0 TO 36.9 IN ADULT: ICD-10-CM

## 2023-12-01 RX ORDER — TOPIRAMATE 50 MG/1
TABLET, FILM COATED ORAL
Qty: 60 TABLET | Refills: 1 | Status: SHIPPED | OUTPATIENT
Start: 2023-12-01 | End: 2024-01-31

## 2023-12-01 NOTE — TELEPHONE ENCOUNTER
Questions regarding the Topiramate 50mg BID;  this is a new prescription for patient for obesity and dose is too high;   normally this med is tapered up.  Can pharmacy get prescription with tapering up instructions.

## 2023-12-06 ENCOUNTER — THERAPY VISIT (OUTPATIENT)
Dept: PHYSICAL THERAPY | Facility: CLINIC | Age: 43
End: 2023-12-06
Attending: PODIATRIST
Payer: COMMERCIAL

## 2023-12-06 ENCOUNTER — MYC REFILL (OUTPATIENT)
Dept: FAMILY MEDICINE | Facility: OTHER | Age: 43
End: 2023-12-06
Payer: COMMERCIAL

## 2023-12-06 DIAGNOSIS — T75.3XXD CAR SICKNESS, SUBSEQUENT ENCOUNTER: ICD-10-CM

## 2023-12-06 DIAGNOSIS — S82.852A CLOSED TRIMALLEOLAR FRACTURE OF LEFT ANKLE, INITIAL ENCOUNTER: Primary | ICD-10-CM

## 2023-12-06 PROCEDURE — 97140 MANUAL THERAPY 1/> REGIONS: CPT | Mod: GP

## 2023-12-06 PROCEDURE — 97110 THERAPEUTIC EXERCISES: CPT | Mod: GP

## 2023-12-06 RX ORDER — ONDANSETRON 8 MG/1
8 TABLET, FILM COATED ORAL EVERY 8 HOURS PRN
Qty: 30 TABLET | Refills: 1 | Status: SHIPPED | OUTPATIENT
Start: 2023-12-06

## 2023-12-20 ENCOUNTER — TELEPHONE (OUTPATIENT)
Dept: PODIATRY | Facility: CLINIC | Age: 43
End: 2023-12-20
Payer: COMMERCIAL

## 2023-12-20 NOTE — TELEPHONE ENCOUNTER
"Patient's form was filled out, reviewed, and signed by provider.      Form was faxed to the designated fax number: [phone]    A copy was sent to scanning.     A copy was placed in the lower level \"faxed\" file/drawer.      Bette Hurt RN   [facility]    "

## 2023-12-20 NOTE — TELEPHONE ENCOUNTER
Reason for Call:  Form, our goal is to have forms completed with 72 hours, however, some forms may require a visit or additional information.    Type of letter, form or note:  disability    Who is the form from?:  [employer]  (if other please explain)    Where did the form come from: form was faxed in    What clinic location was the form placed at?: [facility]    Where the form was placed:  Dr Pendleton's Box/Folder    What number is listed as a contact on the form?: [phone] [facility] [address]       Additional comments: Please complete form and fax to [phone]    Call taken on 12/20/2023 at 2:50 PM by Kinsey Wiggins

## 2023-12-26 ENCOUNTER — TELEPHONE (OUTPATIENT)
Dept: PODIATRY | Facility: CLINIC | Age: 43
End: 2023-12-26

## 2023-12-26 ENCOUNTER — THERAPY VISIT (OUTPATIENT)
Dept: PHYSICAL THERAPY | Facility: CLINIC | Age: 43
End: 2023-12-26
Attending: PODIATRIST
Payer: COMMERCIAL

## 2023-12-26 DIAGNOSIS — S82.852A CLOSED TRIMALLEOLAR FRACTURE OF LEFT ANKLE, INITIAL ENCOUNTER: Primary | ICD-10-CM

## 2023-12-26 PROCEDURE — 97112 NEUROMUSCULAR REEDUCATION: CPT | Mod: GP | Performed by: PHYSICAL THERAPIST

## 2023-12-26 PROCEDURE — 97140 MANUAL THERAPY 1/> REGIONS: CPT | Mod: GP | Performed by: PHYSICAL THERAPIST

## 2023-12-26 PROCEDURE — 97110 THERAPEUTIC EXERCISES: CPT | Mod: GP | Performed by: PHYSICAL THERAPIST

## 2023-12-26 NOTE — TELEPHONE ENCOUNTER
Reason for Call:  Form, our goal is to have forms completed with 72 hours, however, some forms may require a visit or additional information.    Type of letter, form or note:   workability form    Who is the form from?:  Employer (if other please explain)    Where did the form come from: Patient or family brought in       What clinic location was the form placed at?: [facility]    Where the form was placed:  Dr Pendleton's  Box/Folder    What number is listed as a contact on the form?: [phone]       Additional comments: Please complete form and fax to [phone]    Call taken on 12/26/2023 at 1:30 PM by Kinsey Wiggins

## 2023-12-27 NOTE — TELEPHONE ENCOUNTER
Workability form is for patient to return to work without restrictions as of 1/2/24. Will await appointment in order to completed. Pending encounter for appointment date.     Bette Hurt RN   [facility]

## 2024-01-02 ENCOUNTER — TELEPHONE (OUTPATIENT)
Dept: PODIATRY | Facility: CLINIC | Age: 44
End: 2024-01-02

## 2024-01-02 ENCOUNTER — OFFICE VISIT (OUTPATIENT)
Dept: PODIATRY | Facility: CLINIC | Age: 44
End: 2024-01-02
Payer: COMMERCIAL

## 2024-01-02 VITALS
BODY MASS INDEX: 34.87 KG/M2 | SYSTOLIC BLOOD PRESSURE: 122 MMHG | DIASTOLIC BLOOD PRESSURE: 90 MMHG | TEMPERATURE: 96.9 F | WEIGHT: 217 LBS | HEIGHT: 66 IN

## 2024-01-02 DIAGNOSIS — S82.852A CLOSED TRIMALLEOLAR FRACTURE OF LEFT ANKLE, INITIAL ENCOUNTER: Primary | ICD-10-CM

## 2024-01-02 PROCEDURE — 99213 OFFICE O/P EST LOW 20 MIN: CPT | Performed by: PODIATRIST

## 2024-01-02 ASSESSMENT — PAIN SCALES - GENERAL: PAINLEVEL: MILD PAIN (2)

## 2024-01-02 NOTE — TELEPHONE ENCOUNTER
Successfully faxed [employer] Workability form and workability letter  @ [phone]. Copy sent to scanning with change in date 1/2/2024, copy in [facility] [place] drawer. Violeta Luis CMA, January 2, 2024
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
## 2024-01-02 NOTE — LETTER
January 2, 2024      Edda Schmidt  [address]   [place] [address]        To Whom It May Concern:    Edda Schmidt was seen in our clinic. She may return to work with up to 6 hours of weight bearing walking or standing in regular shoe gear.  No other limitations at this time.  Follow up in 2 months or less.        Sincerely,      No att. providers found
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
## 2024-01-03 NOTE — TELEPHONE ENCOUNTER
Please see telephone encounter from 1/2/24.     Bette Hurt RN   [facility]

## 2024-01-15 ENCOUNTER — TELEPHONE (OUTPATIENT)
Dept: PODIATRY | Facility: CLINIC | Age: 44
End: 2024-01-15
Payer: COMMERCIAL

## 2024-01-15 NOTE — TELEPHONE ENCOUNTER
"Patient's form was filled out, reviewed, and signed by provider.      Form was faxed to the designated fax number: [phone]    A copy was sent to scanning.     A copy was placed in the lower level \"faxed\" file/drawer.      Bette Hurt RN   [facility]    "

## 2024-01-15 NOTE — TELEPHONE ENCOUNTER
Reason for Call:  Form, our goal is to have forms completed with 72 hours, however, some forms may require a visit or additional information.    Type of letter, form or note:  FMLA    Who is the form from?: [employer]    Where did the form come from: form was faxed in    What clinic location was the form placed at?: [facility]    Where the form was placed:  Dr. Pendleton's Box/Folder    What number is listed as a contact on the form?: [phone]    Bette Hurt RN   [facility]

## 2024-01-16 ENCOUNTER — HOSPITAL ENCOUNTER (OUTPATIENT)
Dept: ULTRASOUND IMAGING | Facility: CLINIC | Age: 44
Discharge: HOME | End: 2024-01-16
Attending: SURGERY
Payer: COMMERCIAL

## 2024-01-16 ENCOUNTER — HOSPITAL ENCOUNTER (OUTPATIENT)
Dept: MAMMOGRAPHY | Facility: CLINIC | Age: 44
Discharge: HOME | End: 2024-01-16
Attending: SURGERY
Payer: COMMERCIAL

## 2024-01-16 DIAGNOSIS — N63.10 MASS OF RIGHT BREAST, UNSPECIFIED QUADRANT: ICD-10-CM

## 2024-01-16 PROCEDURE — 77062 BREAST TOMOSYNTHESIS BI: CPT

## 2024-01-16 PROCEDURE — 76642 ULTRASOUND BREAST LIMITED: CPT | Mod: RT

## 2024-01-16 PROCEDURE — 76642 ULTRASOUND BREAST LIMITED: CPT | Mod: 50

## 2024-01-31 ENCOUNTER — VIRTUAL VISIT (OUTPATIENT)
Dept: FAMILY MEDICINE | Facility: OTHER | Age: 44
End: 2024-01-31
Payer: COMMERCIAL

## 2024-01-31 DIAGNOSIS — E66.09 CLASS 2 OBESITY DUE TO EXCESS CALORIES WITHOUT SERIOUS COMORBIDITY WITH BODY MASS INDEX (BMI) OF 36.0 TO 36.9 IN ADULT: ICD-10-CM

## 2024-01-31 DIAGNOSIS — E66.812 CLASS 2 OBESITY DUE TO EXCESS CALORIES WITHOUT SERIOUS COMORBIDITY WITH BODY MASS INDEX (BMI) OF 36.0 TO 36.9 IN ADULT: ICD-10-CM

## 2024-01-31 PROCEDURE — 99213 OFFICE O/P EST LOW 20 MIN: CPT | Mod: 95 | Performed by: FAMILY MEDICINE

## 2024-01-31 RX ORDER — TOPIRAMATE 50 MG/1
50 TABLET, FILM COATED ORAL 2 TIMES DAILY
Qty: 180 TABLET | Refills: 1 | Status: SHIPPED | OUTPATIENT
Start: 2024-01-31 | End: 2024-02-12

## 2024-01-31 NOTE — PROGRESS NOTES
"Janeen is a 44 year old who is being evaluated via a billable video visit.        Instructions Relayed to Patient by Virtual Roomer:     Patient is active on [employer]:   Relayed following to patient: \"It looks like you are active on [employer], are you able to join the visit this way? If not, do you need us to send you a link now or would you like your provider to send a link via text or email when they are ready to initiate the visit?\"    Reminded patient to ensure they were logged on to virtual visit by arrival time listed. Documented in appointment notes if patient had flexibility to initiate visit sooner than arrival time. If pediatric virtual visit, ensured pediatric patient along with parent/guardian will be present for video visit.     Patient offered the website [email or web address] and/or phone number to [employer] Help line: [phone]   How would you like to obtain your AVS? [employer]  If the video visit is dropped, the invitation should be resent by: Text to cell phone: [phone]  Will anyone else be joining your video visit? No          Assessment & Plan         ICD-10-CM    1. Class 2 obesity due to excess calories without serious comorbidity with body mass index (BMI) of 36.0 to 36.9 in adult  E66.09 topiramate (TOPAMAX) 50 MG tablet    Z68.36 tirzepatide-Weight Management (ZEPBOUND) 2.5 MG/0.5ML prefilled pen          Patient has had 4% weight loss in the last month and is tolerating the Topamax quite well.  She would like to see a little bit more vocal weight loss with this medication for potentially adding another medication.  We talked about the risk benefits and alternatives available for her and due to her last blood pressure being a little bit elevated she was advised close monitoring if we chose to do a stimulant would be needed.  Because of this she would choose to use another medication and given the abnormal blood sugar at the last check she would like to try a GLP-1.  We will " see if this is available to her due to access issues and if it is a problem for her we certainly can consider increasing Topamax which was her the second choice if it was unable to be added on for the GLP-1    I spent a total of 17 minutes on the day of the visit.   Time spent by me doing chart review, history and exam, documentation and further activities per the note    Rosetta Nicole MD             Sheryl Vernon is a 44 year old, presenting for the following health issues:  Recheck Medication        1/31/2024     2:15 PM   Additional Questions   Roomed by Vamshi JAMES     History of Present Illness       Reason for visit:  Recheck Topimax    She eats 2-3 servings of fruits and vegetables daily.She consumes 0 sweetened beverage(s) daily.She exercises with enough effort to increase her heart rate 20 to 29 minutes per day.  She exercises with enough effort to increase her heart rate 3 or less days per week.   She is taking medications regularly.       Medication Followup of Topimax  Taking Medication as prescribed: yes  Side Effects:  None  Medication Helping Symptoms:  yes      Weight decreased 7 lbs in the last month. Was getting some has and these are much better. Not really needing ibuprofen or tylenol.          Objective    Vitals - Patient Reported  Weight (Patient Reported): 95.3 kg (210 lb)      Vitals:  No vitals were obtained today due to virtual visit.    Physical Exam   GENERAL: alert and no distress  EYES: Eyes grossly normal to inspection.  No discharge or erythema, or obvious scleral/conjunctival abnormalities.  RESP: No audible wheeze, cough, or visible cyanosis.    SKIN: Visible skin clear. No significant rash, abnormal pigmentation or lesions.  NEURO: Cranial nerves grossly intact.  Mentation and speech appropriate for age.  PSYCH: Appropriate affect, tone, and pace of words          Video-Visit Details    Type of service:  Video Visit     Originating Location (pt. Location): Home    Distant Location  (provider location):  On-site  Platform used for Video Visit: Crystal  Signed Electronically by: Rosetta Nicole MD, MD

## 2024-02-12 ENCOUNTER — MYC MEDICAL ADVICE (OUTPATIENT)
Dept: FAMILY MEDICINE | Facility: OTHER | Age: 44
End: 2024-02-12
Payer: COMMERCIAL

## 2024-02-12 DIAGNOSIS — E66.09 CLASS 2 OBESITY DUE TO EXCESS CALORIES WITHOUT SERIOUS COMORBIDITY WITH BODY MASS INDEX (BMI) OF 36.0 TO 36.9 IN ADULT: ICD-10-CM

## 2024-02-12 DIAGNOSIS — E66.812 CLASS 2 OBESITY DUE TO EXCESS CALORIES WITHOUT SERIOUS COMORBIDITY WITH BODY MASS INDEX (BMI) OF 36.0 TO 36.9 IN ADULT: ICD-10-CM

## 2024-02-12 RX ORDER — TOPIRAMATE 100 MG/1
100 TABLET, FILM COATED ORAL 2 TIMES DAILY
Qty: 180 TABLET | Refills: 0 | Status: SHIPPED | OUTPATIENT
Start: 2024-02-12 | End: 2024-04-10

## 2024-02-16 ENCOUNTER — MYC REFILL (OUTPATIENT)
Dept: OBGYN | Facility: OTHER | Age: 44
End: 2024-02-16
Payer: COMMERCIAL

## 2024-02-16 ENCOUNTER — MYC MEDICAL ADVICE (OUTPATIENT)
Dept: OBGYN | Facility: CLINIC | Age: 44
End: 2024-02-16
Payer: COMMERCIAL

## 2024-02-16 DIAGNOSIS — N95.1 MENOPAUSAL SYNDROME (HOT FLASHES): ICD-10-CM

## 2024-02-16 RX ORDER — ESTRADIOL 0.05 MG/D
1 PATCH, EXTENDED RELEASE TRANSDERMAL
Qty: 24 PATCH | Refills: 3 | OUTPATIENT
Start: 2024-02-19

## 2024-02-16 NOTE — TELEPHONE ENCOUNTER
Requested Prescriptions   Pending Prescriptions Disp Refills    estradiol (VIVELLE-DOT) 0.05 MG/24HR bi-weekly patch 24 patch 3     Sig: Place 1 patch onto the skin twice a week       Hormone Replacement Therapy Failed - 2/16/2024  9:01 AM        Failed - Blood pressure under 140/90 in past 12 months     BP Readings from Last 3 Encounters:   01/02/24 (!) 122/90   11/29/23 122/66   11/28/23 106/70                 Passed - Recent (12 mo) or future (30 days) visit within the authorizing provider's specialty     The patient must have completed an in-person or virtual visit within the past 12 months or has a future visit scheduled within the next 90 days with the authorizing provider s specialty.  Urgent care and e-visits do not quality as an office visit for this protocol.          Passed - Medication is active on med list        Passed - Patient is 18 years of age or older        Passed - No active pregnancy on record        Passed - No positive pregnancy test on record in past 12 months           Last seen: VV 10/16/2023  Last ordered:  10/16/2023 for 24 patches with 3 refills    Pt has refills remaining at [facility] in [place], [place]  Sent patient [employer] message letting her know refills remaining and to contact pharmacy for refill of medication.   Gill MCDERMOTT RN

## 2024-02-20 ENCOUNTER — OFFICE VISIT (OUTPATIENT)
Dept: PODIATRY | Facility: CLINIC | Age: 44
End: 2024-02-20
Payer: COMMERCIAL

## 2024-02-20 VITALS
BODY MASS INDEX: 34.72 KG/M2 | SYSTOLIC BLOOD PRESSURE: 110 MMHG | WEIGHT: 216 LBS | DIASTOLIC BLOOD PRESSURE: 72 MMHG | HEIGHT: 66 IN

## 2024-02-20 DIAGNOSIS — S82.852A CLOSED TRIMALLEOLAR FRACTURE OF LEFT ANKLE, INITIAL ENCOUNTER: Primary | ICD-10-CM

## 2024-02-20 PROCEDURE — 99213 OFFICE O/P EST LOW 20 MIN: CPT | Performed by: PODIATRIST

## 2024-02-20 ASSESSMENT — PAIN SCALES - GENERAL: PAINLEVEL: MILD PAIN (3)
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
## 2024-02-20 NOTE — LETTER
February 20, 2024      Edda Schmidt  [address]   [place] [address]        To Whom It May Concern:    Edda Schmidt was seen in our clinic. She may return to unrestricted work and all activities now.        Sincerely,      Krunal Pendleton
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
## 2024-02-25 ENCOUNTER — HOSPITAL ENCOUNTER (EMERGENCY)
Facility: CLINIC | Age: 44
Discharge: HOME | End: 2024-02-25
Attending: NURSE PRACTITIONER | Admitting: NURSE PRACTITIONER
Payer: COMMERCIAL

## 2024-02-25 VITALS
HEART RATE: 81 BPM | BODY MASS INDEX: 35.34 KG/M2 | WEIGHT: 216 LBS | TEMPERATURE: 97.6 F | DIASTOLIC BLOOD PRESSURE: 89 MMHG | OXYGEN SATURATION: 100 % | SYSTOLIC BLOOD PRESSURE: 139 MMHG | RESPIRATION RATE: 18 BRPM

## 2024-02-25 DIAGNOSIS — L50.9 URTICARIA: ICD-10-CM

## 2024-02-25 DIAGNOSIS — L03.119 CELLULITIS OF LOWER EXTREMITY, UNSPECIFIED LATERALITY: ICD-10-CM

## 2024-02-25 PROCEDURE — 99284 EMERGENCY DEPT VISIT MOD MDM: CPT | Performed by: NURSE PRACTITIONER

## 2024-02-25 RX ORDER — PREDNISONE 10 MG/1
TABLET ORAL
Qty: 38 TABLET | Refills: 0 | Status: SHIPPED | OUTPATIENT
Start: 2024-02-25 | End: 2024-03-10

## 2024-02-25 RX ORDER — CLOTRIMAZOLE 1 %
CREAM (GRAM) TOPICAL 2 TIMES DAILY
Qty: 45 G | Refills: 0 | Status: SHIPPED | OUTPATIENT
Start: 2024-02-25 | End: 2024-09-11

## 2024-02-25 RX ORDER — CEPHALEXIN 500 MG/1
500 CAPSULE ORAL 4 TIMES DAILY
Qty: 40 CAPSULE | Refills: 0 | Status: SHIPPED | OUTPATIENT
Start: 2024-02-25 | End: 2024-03-06

## 2024-02-25 ASSESSMENT — ACTIVITIES OF DAILY LIVING (ADL)
ADLS_ACUITY_SCORE: 35
ADLS_ACUITY_SCORE: 33

## 2024-02-25 ASSESSMENT — COLUMBIA-SUICIDE SEVERITY RATING SCALE - C-SSRS
2. HAVE YOU ACTUALLY HAD ANY THOUGHTS OF KILLING YOURSELF IN THE PAST MONTH?: NO
6. HAVE YOU EVER DONE ANYTHING, STARTED TO DO ANYTHING, OR PREPARED TO DO ANYTHING TO END YOUR LIFE?: NO
1. IN THE PAST MONTH, HAVE YOU WISHED YOU WERE DEAD OR WISHED YOU COULD GO TO SLEEP AND NOT WAKE UP?: NO

## 2024-02-25 NOTE — DISCHARGE INSTRUCTIONS
Keflex 500 mg 4 times a day for 10 days.  Prednisone taper as prescribed.  Cetirizine (over-the-counter) 1 tablet twice a day.  Clotrimazole cream to the 2 lesions on your legs twice a day for 2 weeks.  Cool compresses.    Recheck for any worsening.

## 2024-02-25 NOTE — ED TRIAGE NOTES
Patient is here with a rash to bilateral legs. She has been taking steroids and antibiotics for it, but getting worse. Rash is now spreading to her entire body.      Triage Assessment (Adult)       Row Name 02/25/24 0958          Triage Assessment    Airway WDL WDL        Respiratory WDL    Respiratory WDL WDL        Skin Circulation/Temperature WDL    Skin Circulation/Temperature WDL X  rash        Cardiac WDL    Cardiac WDL WDL        Peripheral/Neurovascular WDL    Peripheral Neurovascular WDL WDL        Cognitive/Neuro/Behavioral WDL    Cognitive/Neuro/Behavioral WDL WDL
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
## 2024-03-05 ENCOUNTER — MYC MEDICAL ADVICE (OUTPATIENT)
Dept: FAMILY MEDICINE | Facility: OTHER | Age: 44
End: 2024-03-05
Payer: COMMERCIAL

## 2024-03-05 NOTE — TELEPHONE ENCOUNTER
Would you advise clinic follow up or optometry/ophthalmology?     Halima SENN, RN  [facility]

## 2024-03-18 ENCOUNTER — MYC REFILL (OUTPATIENT)
Dept: FAMILY MEDICINE | Facility: OTHER | Age: 44
End: 2024-03-18
Payer: COMMERCIAL

## 2024-03-18 DIAGNOSIS — R45.86 MOOD SWINGS: ICD-10-CM

## 2024-03-19 RX ORDER — VENLAFAXINE HYDROCHLORIDE 150 MG/1
150 CAPSULE, EXTENDED RELEASE ORAL DAILY
Qty: 90 CAPSULE | Refills: 1 | Status: SHIPPED | OUTPATIENT
Start: 2024-03-19 | End: 2024-06-19

## 2024-04-10 ENCOUNTER — OFFICE VISIT (OUTPATIENT)
Dept: FAMILY MEDICINE | Facility: OTHER | Age: 44
End: 2024-04-10
Payer: COMMERCIAL

## 2024-04-10 VITALS
SYSTOLIC BLOOD PRESSURE: 122 MMHG | HEIGHT: 66 IN | HEART RATE: 59 BPM | TEMPERATURE: 97 F | OXYGEN SATURATION: 99 % | RESPIRATION RATE: 16 BRPM | BODY MASS INDEX: 34.07 KG/M2 | WEIGHT: 212 LBS | DIASTOLIC BLOOD PRESSURE: 72 MMHG

## 2024-04-10 DIAGNOSIS — H53.8 BLURRED VISION: ICD-10-CM

## 2024-04-10 DIAGNOSIS — E66.09 CLASS 2 OBESITY DUE TO EXCESS CALORIES WITHOUT SERIOUS COMORBIDITY WITH BODY MASS INDEX (BMI) OF 36.0 TO 36.9 IN ADULT: Primary | ICD-10-CM

## 2024-04-10 DIAGNOSIS — L30.8 OTHER ECZEMA: ICD-10-CM

## 2024-04-10 DIAGNOSIS — E66.812 CLASS 2 OBESITY DUE TO EXCESS CALORIES WITHOUT SERIOUS COMORBIDITY WITH BODY MASS INDEX (BMI) OF 36.0 TO 36.9 IN ADULT: Primary | ICD-10-CM

## 2024-04-10 DIAGNOSIS — H04.123 DRY EYES: ICD-10-CM

## 2024-04-10 PROCEDURE — 90677 PCV20 VACCINE IM: CPT | Performed by: FAMILY MEDICINE

## 2024-04-10 PROCEDURE — 90471 IMMUNIZATION ADMIN: CPT | Performed by: FAMILY MEDICINE

## 2024-04-10 PROCEDURE — 99214 OFFICE O/P EST MOD 30 MIN: CPT | Mod: 25 | Performed by: FAMILY MEDICINE

## 2024-04-10 RX ORDER — PHENTERMINE HYDROCHLORIDE 15 MG/1
15 CAPSULE ORAL EVERY MORNING
Qty: 30 CAPSULE | Refills: 0 | Status: SHIPPED | OUTPATIENT
Start: 2024-04-10 | End: 2024-05-13

## 2024-04-10 ASSESSMENT — PAIN SCALES - GENERAL: PAINLEVEL: NO PAIN (0)

## 2024-04-10 NOTE — PROGRESS NOTES
Assessment & Plan         ICD-10-CM    1. Class 2 obesity due to excess calories without serious comorbidity with body mass index (BMI) of 36.0 to 36.9 in adult  E66.09 phentermine 15 MG capsule    Z68.36 Adult Comprehensive Weight Management  Referral      2. Other eczema  L30.8       3. Dry eyes  H04.123       4. Blurred vision  H53.8           Discussed the success with Topamax that she has been finding though with the side effects that she is experiencing it may be better to change her off this medication.  We asked her to stop the Topamax and switch to phentermine.  If she is finding that her dry eyes and blurry vision have improved then we potentially could restart the Topamax at a lower dose.  Because we do not typically use phentermine long-term within primary care we did talk about potentially reaching out to the weight loss clinic for secondary help as well.  She is a little concerned about the pituitary adenoma having something that had progressed and caused metastasis to her brain given her vision changes and we did encourage her to give it a chance to see if the Topamax removal will make a difference for this but if it does not she would like a scan of her brain for potential masses.    I spent a total of 24 minutes on the day of the visit.   Time spent by me doing chart review, history and exam, documentation and further activities per the note    Rosetta Nicole MD         Sheryl Vernon is a 44 year old, presenting for the following health issues:  Recheck Medication        4/10/2024     7:06 AM   Additional Questions   Roomed by mode   Accompanied by alone         4/10/2024     7:06 AM   Patient Reported Additional Medications   Patient reports taking the following new medications none     History of Present Illness       Reason for visit:  Medication check, double vision, wt loss    She eats 2-3 servings of fruits and vegetables daily.She consumes 0 sweetened beverage(s) daily.She  "exercises with enough effort to increase her heart rate 20 to 29 minutes per day.  She exercises with enough effort to increase her heart rate 3 or less days per week. She is missing 1 dose(s) of medications per week.  She is not taking prescribed medications regularly due to remembering to take.         Medication Followup of topiramate  Taking Medication as prescribed: yes  Side Effects:  no energy   Medication Helping Symptoms:  yes and no      Blurry eyes - evaluated by ophthal and just dry, even normal pressures.      Objective    /72   Pulse 59   Temp 97  F (36.1  C) (Temporal)   Resp 16   Ht 1.664 m (5' 5.5\")   Wt 96.2 kg (212 lb)   LMP 01/19/2023   SpO2 99%   BMI 34.74 kg/m    Body mass index is 34.74 kg/m .  Physical Exam   GENERAL: alert and no distress  EYES: Dry eyes  RESP: lungs clear to auscultation - no rales, rhonchi or wheezes  CV: regular rate and rhythm, normal S1 S2, no S3 or S4, no murmur, click or rub, no peripheral edema  MS: no gross musculoskeletal defects noted, no edema  SKIN: Dry skin with some redness surrounding her left jawline and near her nose  NEURO: Normal strength and tone, mentation intact and speech normal  PSYCH: mentation appears normal, affect normal/bright            Signed Electronically by: Rosetta Nicole MD, MD    "

## 2024-05-09 PROBLEM — N39.3 STRESS INCONTINENCE OF URINE: Status: RESOLVED | Noted: 2022-02-01 | Resolved: 2024-05-09

## 2024-05-13 ENCOUNTER — MYC MEDICAL ADVICE (OUTPATIENT)
Dept: FAMILY MEDICINE | Facility: OTHER | Age: 44
End: 2024-05-13
Payer: COMMERCIAL

## 2024-05-13 DIAGNOSIS — E66.812 CLASS 2 OBESITY DUE TO EXCESS CALORIES WITHOUT SERIOUS COMORBIDITY WITH BODY MASS INDEX (BMI) OF 36.0 TO 36.9 IN ADULT: ICD-10-CM

## 2024-05-13 DIAGNOSIS — E66.09 CLASS 2 OBESITY DUE TO EXCESS CALORIES WITHOUT SERIOUS COMORBIDITY WITH BODY MASS INDEX (BMI) OF 36.0 TO 36.9 IN ADULT: ICD-10-CM

## 2024-05-16 RX ORDER — PHENTERMINE HYDROCHLORIDE 15 MG/1
15 CAPSULE ORAL EVERY MORNING
Qty: 30 CAPSULE | Refills: 0 | Status: SHIPPED | OUTPATIENT
Start: 2024-05-16 | End: 2024-05-23

## 2024-05-16 NOTE — TELEPHONE ENCOUNTER
Ok to use Wednesday or Thursday rounding spots next week for this?  Otherwise 9:30 Approval spot on Wednesday 5/29?     Payton Lopez RN on 5/16/2024 at 10:04 AM

## 2024-05-16 NOTE — TELEPHONE ENCOUNTER
Attempted to call patient with the following message below. Left a voicemail for the patient to call the clinic back.    Also sent i[employer] message.    Estefany Blanchard MA

## 2024-05-16 NOTE — TELEPHONE ENCOUNTER
Typically this would be a same day appt request and any of these can be offered.   So right now for next week, I only have a 9 am prenatal we can use.  Rosetta Nicole MD

## 2024-05-20 ASSESSMENT — ASTHMA QUESTIONNAIRES
ACT_TOTALSCORE: 23
ACT_TOTALSCORE: 23
QUESTION_4 LAST FOUR WEEKS HOW OFTEN HAVE YOU USED YOUR RESCUE INHALER OR NEBULIZER MEDICATION (SUCH AS ALBUTEROL): ONCE A WEEK OR LESS
QUESTION_2 LAST FOUR WEEKS HOW OFTEN HAVE YOU HAD SHORTNESS OF BREATH: NOT AT ALL
QUESTION_1 LAST FOUR WEEKS HOW MUCH OF THE TIME DID YOUR ASTHMA KEEP YOU FROM GETTING AS MUCH DONE AT WORK, SCHOOL OR AT HOME: NONE OF THE TIME
QUESTION_3 LAST FOUR WEEKS HOW OFTEN DID YOUR ASTHMA SYMPTOMS (WHEEZING, COUGHING, SHORTNESS OF BREATH, CHEST TIGHTNESS OR PAIN) WAKE YOU UP AT NIGHT OR EARLIER THAN USUAL IN THE MORNING: ONCE OR TWICE
QUESTION_5 LAST FOUR WEEKS HOW WOULD YOU RATE YOUR ASTHMA CONTROL: COMPLETELY CONTROLLED

## 2024-05-23 ENCOUNTER — OFFICE VISIT (OUTPATIENT)
Dept: FAMILY MEDICINE | Facility: OTHER | Age: 44
End: 2024-05-23
Payer: COMMERCIAL

## 2024-05-23 VITALS
BODY MASS INDEX: 35.12 KG/M2 | HEIGHT: 66 IN | RESPIRATION RATE: 10 BRPM | WEIGHT: 218.5 LBS | SYSTOLIC BLOOD PRESSURE: 116 MMHG | OXYGEN SATURATION: 99 % | DIASTOLIC BLOOD PRESSURE: 80 MMHG | TEMPERATURE: 97.7 F | HEART RATE: 63 BPM

## 2024-05-23 DIAGNOSIS — E66.09 CLASS 2 OBESITY DUE TO EXCESS CALORIES WITHOUT SERIOUS COMORBIDITY WITH BODY MASS INDEX (BMI) OF 36.0 TO 36.9 IN ADULT: ICD-10-CM

## 2024-05-23 DIAGNOSIS — E66.812 CLASS 2 OBESITY DUE TO EXCESS CALORIES WITHOUT SERIOUS COMORBIDITY WITH BODY MASS INDEX (BMI) OF 36.0 TO 36.9 IN ADULT: ICD-10-CM

## 2024-05-23 PROCEDURE — 99213 OFFICE O/P EST LOW 20 MIN: CPT | Performed by: FAMILY MEDICINE

## 2024-05-23 RX ORDER — PHENTERMINE HYDROCHLORIDE 15 MG/1
15 CAPSULE ORAL EVERY MORNING
Qty: 30 CAPSULE | Refills: 0 | Status: SHIPPED | OUTPATIENT
Start: 2024-05-23 | End: 2024-06-19

## 2024-05-23 RX ORDER — TOPIRAMATE 50 MG/1
50 TABLET, FILM COATED ORAL 2 TIMES DAILY
Qty: 60 TABLET | Refills: 0 | Status: SHIPPED | OUTPATIENT
Start: 2024-05-23 | End: 2024-06-19 | Stop reason: SINTOL

## 2024-05-23 ASSESSMENT — PAIN SCALES - GENERAL: PAINLEVEL: NO PAIN (0)

## 2024-05-23 NOTE — PROGRESS NOTES
"  Assessment & Plan         ICD-10-CM    1. Class 2 obesity due to excess calories without serious comorbidity with body mass index (BMI) of 36.0 to 36.9 in adult  E66.09 phentermine 15 MG capsule    Z68.36 topiramate (TOPAMAX) 50 MG tablet          Patient is having increased weight with the phentermine alone. Had response but side effects from the topamax, but only at higher dosing. Would consider reduced dose of topamax with the phentermine to see if we can get ths back on track. Patient is agreeable to this.  1 month follow-up     I spent a total of 15 minutes on the day of the visit.   Time spent by me doing chart review, history and exam, documentation and further activities per the note    Rosetta Nicole MD           BMI  Estimated body mass index is 35.79 kg/m  as calculated from the following:    Height as of this encounter: 1.664 m (5' 5.51\").    Weight as of this encounter: 99.1 kg (218 lb 8 oz).   Weight management plan: Discussed healthy diet and exercise guidelines        Sheryl Vernon is a 44 year old, presenting for the following health issues:  Hypertension and Recheck Medication        5/23/2024     9:03 AM   Additional Questions   Roomed by AM     History of Present Illness       Reason for visit:  Med and bp check    She eats 2-3 servings of fruits and vegetables daily.She consumes 0 sweetened beverage(s) daily.She exercises with enough effort to increase her heart rate 30 to 60 minutes per day.  She exercises with enough effort to increase her heart rate 4 days per week.   She is taking medications regularly.                     Objective    /80 (Cuff Size: Adult Large)   Pulse 63   Temp 97.7  F (36.5  C) (Oral)   Resp 10   Ht 1.664 m (5' 5.51\")   Wt 99.1 kg (218 lb 8 oz)   LMP 01/19/2023   SpO2 99%   BMI 35.79 kg/m    Body mass index is 35.79 kg/m .  Physical Exam   GENERAL: alert and no distress  RESP: lungs clear to auscultation - no rales, rhonchi or wheezes  CV: regular rate " and rhythm, normal S1 S2, no S3 or S4, no murmur, click or rub, no peripheral edema  MS: no gross musculoskeletal defects noted, no edema  SKIN: no suspicious lesions or rashes  NEURO: Normal strength and tone, mentation intact and speech normal  PSYCH: mentation appears normal, affect normal/bright            Signed Electronically by: Rosetta Nicole MD, MD

## 2024-06-19 ENCOUNTER — OFFICE VISIT (OUTPATIENT)
Dept: FAMILY MEDICINE | Facility: OTHER | Age: 44
End: 2024-06-19
Payer: COMMERCIAL

## 2024-06-19 VITALS
HEART RATE: 64 BPM | RESPIRATION RATE: 16 BRPM | DIASTOLIC BLOOD PRESSURE: 70 MMHG | TEMPERATURE: 98.1 F | SYSTOLIC BLOOD PRESSURE: 122 MMHG | WEIGHT: 216 LBS | HEIGHT: 66 IN | BODY MASS INDEX: 34.72 KG/M2 | OXYGEN SATURATION: 97 %

## 2024-06-19 DIAGNOSIS — E66.09 CLASS 2 OBESITY DUE TO EXCESS CALORIES WITHOUT SERIOUS COMORBIDITY WITH BODY MASS INDEX (BMI) OF 36.0 TO 36.9 IN ADULT: ICD-10-CM

## 2024-06-19 DIAGNOSIS — E66.812 CLASS 2 OBESITY DUE TO EXCESS CALORIES WITHOUT SERIOUS COMORBIDITY WITH BODY MASS INDEX (BMI) OF 36.0 TO 36.9 IN ADULT: ICD-10-CM

## 2024-06-19 DIAGNOSIS — R45.86 MOOD SWINGS: ICD-10-CM

## 2024-06-19 PROCEDURE — 99213 OFFICE O/P EST LOW 20 MIN: CPT | Performed by: FAMILY MEDICINE

## 2024-06-19 RX ORDER — PHENTERMINE HYDROCHLORIDE 37.5 MG/1
37.5 CAPSULE ORAL EVERY MORNING
Qty: 30 CAPSULE | Refills: 2 | Status: SHIPPED | OUTPATIENT
Start: 2024-06-19

## 2024-06-19 RX ORDER — VENLAFAXINE HYDROCHLORIDE 150 MG/1
150 CAPSULE, EXTENDED RELEASE ORAL DAILY
Qty: 90 CAPSULE | Refills: 1 | Status: SHIPPED | OUTPATIENT
Start: 2024-06-19

## 2024-06-19 ASSESSMENT — PAIN SCALES - GENERAL: PAINLEVEL: NO PAIN (0)

## 2024-06-19 NOTE — PROGRESS NOTES
Assessment & Plan         ICD-10-CM    1. Mood swings  R45.86 venlafaxine (EFFEXOR XR) 150 MG 24 hr capsule      2. Class 2 obesity due to excess calories without serious comorbidity with body mass index (BMI) of 36.0 to 36.9 in adult  E66.09 phentermine (ADIPEX-P) 37.5 MG capsule    Z68.36           Patient has been quite well-controlled with her mood and would like to continue her current medications that dose which were renewed.  She was unable to tolerate the Topamax due to blurry vision that she would get even at the low doses.  She did discontinue this and has continued her phentermine with only a 2 pound weight loss in the last month.  As she is tolerating well and having some weight loss we did offer to increase the dosing and she is interested in this so we did increase to the full 37.5 mg capsules and asked her to monitor her weight loss as well as blood pressures and Caseys crime up though she has been looking well so far.  We typically do not maintain phentermine in the long run within primary care's we did offer her weight loss clinic as it can take some time for scheduling and she declines this at this time.  We did also talk about naltrexone as a medication that works similarly to Topamax but has less of the blurry vision that as it is a listed potential side effect she declines this as well.  At this time she is not a candidate for the GLP-1's and so we are at the max of what we can offer her and so if she continues to want weight loss medications in the next 3 months we either have to have a change of her plans and ability to take some the medications offered for her or see a weight loss clinic and she understands this today.  No plan follow-up was given today other than to send us weights and blood pressures within the month.  Also no further refills were planned for the phentermine.    I spent a total of 20 minutes on the day of the visit.   Time spent by me doing chart review, history and exam,  "documentation and further activities per the note    Rosetta Nicole MD                 Sheryl Vernon is a 44 year old, presenting for the following health issues:  Recheck Medication        6/19/2024     3:42 PM   Additional Questions   Roomed by mode   Accompanied by alone         6/19/2024     3:42 PM   Patient Reported Additional Medications   Patient reports taking the following new medications none     History of Present Illness       Reason for visit:  Med check    She eats 2-3 servings of fruits and vegetables daily.She consumes 0 sweetened beverage(s) daily.She exercises with enough effort to increase her heart rate 30 to 60 minutes per day.  She exercises with enough effort to increase her heart rate 4 days per week.   She is taking medications regularly.         Medication Followup of phentermine   Taking Medication as prescribed: yes  Side Effects:  None  Medication Helping Symptoms:  yes  Medication Followup of venlafaxine  Taking Medication as prescribed: yes  Side Effects:  None  Medication Helping Symptoms:  yes          Objective    /70   Pulse 64   Temp 98.1  F (36.7  C) (Temporal)   Resp 16   Ht 1.664 m (5' 5.51\")   Wt 98 kg (216 lb)   LMP 01/19/2023   SpO2 97%   BMI 35.39 kg/m    Body mass index is 35.39 kg/m .  Physical Exam   GENERAL: alert and no distress  RESP: lungs clear to auscultation - no rales, rhonchi or wheezes  CV: regular rate and rhythm, normal S1 S2, no S3 or S4, no murmur, click or rub, no peripheral edema  MS: no gross musculoskeletal defects noted, no edema  SKIN: no suspicious lesions or rashes  NEURO: Normal strength and tone, mentation intact and speech normal  PSYCH: mentation appears normal, affect normal/bright            Signed Electronically by: Rosetta Nicole MD, MD    "

## 2024-06-28 PROBLEM — S82.852A CLOSED TRIMALLEOLAR FRACTURE OF LEFT ANKLE, INITIAL ENCOUNTER: Status: RESOLVED | Noted: 2023-11-16 | Resolved: 2024-06-28

## 2024-06-29 NOTE — PROGRESS NOTES
DISCHARGE  Reason for Discharge: Patient has failed to schedule further appointments.    Equipment Issued:     Discharge Plan: Patient to continue home program.    Referring Provider:  Krunal Pendleton       12/26/23 0500   Appointment Info   Signing clinician's name / credentials Dallin Esquivel PT   Total/Authorized Visits [facility]   Visits Used 4   Medical Diagnosis Closed trimalleolar fracture of left ankle, initial encounter (S82.852A)   PT Tx Diagnosis Closed trimalleolar fracture of left ankle, initial encounter (S82.855V)   Progress Note/Certification   Onset of illness/injury or Date of Surgery 09/15/23   Therapy Frequency 1x/week   Predicted Duration 10 weeks   Progress Note Due Date 01/25/24   GOALS   PT Goals 2;3;4   PT Goal 1   Goal Identifier Home Programming   Goal Description Patient will report good adherence with HEP for 5 weeks performing exercises 5 of 7 days with good technique to increase left ankle strength, ROM and stability and allow patient to return to work without limitations in functional mobility.   Rationale to maximize safety and independence with performance of ADLs and functional tasks;to maximize safety and independence within the home;to maximize safety and independence within the community   Goal Progress Patient reports good adherence to HEP performing 1-2x/daily since last session.   Target Date 12/21/23   PT Goal 2   Goal Identifier LEFS   Goal Description Patient will report an improvement in LEFS of 9 points or greater to allow patient to return to work and recreational activities without limitations in functional mobility.   Rationale to maximize safety and independence with performance of ADLs and functional tasks;to maximize safety and independence within the home;to maximize safety and independence within the community   Goal Progress Not tested today.   Target Date 01/25/24   PT Goal 3   Goal Identifier Gait   Goal Description Patient will ambulate 500ft  without boot or increasing left ankle pain and no use of compensatory patterns to allow patient to return to work without restrictions.   Rationale to maximize safety and independence with performance of ADLs and functional tasks;to maximize safety and independence within the home;to maximize safety and independence within the community   Goal Progress Not tested today.   Target Date 01/25/23   PT Goal 4   Goal Identifier Left ankle strength   Goal Description Patient will demonstrate 5/5 left ankle muscle strength in all planes of motion to increase stability in weight bearing positions, allow patient to ambulate without boot, and decrease risk for re-injury.   Rationale to maximize safety and independence with performance of ADLs and functional tasks;to maximize safety and independence within the home;to maximize safety and independence within the community   Goal Progress Not tested today.   Target Date 01/25/23   Subjective Report   Subjective Report Pt denies pain, reports some achiness through distal medial ankle.   Objective Measures   Objective Measures Objective Measure 1;Objective Measure 2;Objective Measure 3   Treatment Interventions (PT)   Interventions Manual Therapy;Therapeutic Procedure/Exercise;Neuromuscular Re-education   Therapeutic Procedure/Exercise   Therapeutic Procedures: strength, endurance, ROM, flexibility minutes (69484) 10   Ther Proc 1 - Details Pt instructed in sidelying hip abd to strengthen hips for support of LE chain. Pt performs x15 reps with cuing for setup and performance.   Patient Response/Progress Pt tolerating well, responds to cuing.   Ther Proc 1 Strength training   Neuromuscular Re-education   Neuromuscular re-ed of mvmt, balance, coord, kinesthetic sense, posture, proprioception minutes (05875) 24   Neuro Re-ed 1 Balance training   Neuro Re-ed 1 - Details Pt standing in romberg on airex for NBOS, steady until eyes closed, noted ot have increased sway but remains  controlled with no major LOB. Prioprioceptive perturbations with EO/EC on airex, pt meeting resistance well. Cone tapping for dynamic SLS challenge, pt with variable tolerance and balance, able to tap 1-3 cones before resetting. Pt fatigues fairly quickly with cumulative exercises.   Patient Response/Progress Pt well challenged.   Manual Therapy   Manual Therapy: Mobilization, MFR, MLD, friction massage minutes (24838) 8   Manual Therapy 1 - Details Grade 2-3 proximal tib-fib, talocrural, subtalar, and midfoot joint mobs to ensure prpper foot mobility. Pt noted to have good joint movement, appropriate.   Patient Response/Progress Pt tolerating well   Education   Learner/Method Patient;Listening;Reading;Demonstration;Pictures/Video;No Barriers to Learning   Plan   Home program Added heel raises to HEP. See PTRx for details.   Plan for next session Continue left ankle ROM/strengthening exercises, WBing exercises and progress as tolerated.   Total Session Time   Timed Code Treatment Minutes 42   Total Treatment Time (sum of timed and untimed services) 42

## 2024-07-14 ENCOUNTER — HEALTH MAINTENANCE LETTER (OUTPATIENT)
Age: 44
End: 2024-07-14

## 2024-08-11 ENCOUNTER — MYC REFILL (OUTPATIENT)
Dept: OBGYN | Facility: CLINIC | Age: 44
End: 2024-08-11
Payer: COMMERCIAL

## 2024-08-11 DIAGNOSIS — N90.89 VULVAR IRRITATION: ICD-10-CM

## 2024-08-12 NOTE — TELEPHONE ENCOUNTER
Unable to reach patient via phone. RN left a message and instructed patient to call the clinic at [phone].    Called left message for patient to find out if she ran out of the clobetasol cream & needs a refill, if she stopped using it and wanted to try using to again ect.     Gill MCDERMOTT RN -  OB/GYN group

## 2024-08-12 NOTE — TELEPHONE ENCOUNTER
Requested Prescriptions   Pending Prescriptions Disp Refills    clobetasol (TEMOVATE) 0.05 % external ointment 60 g 0     Sig: Apply topically at bedtime       There is no refill protocol information for this order        Last seen: 10/16/2023 Virtual for menopausal syndrome  Last refilled: 9/07/2022    Routing to provider for review/auth of Rx.     Gill MCDERMOTT RN - [facility]

## 2024-08-15 NOTE — TELEPHONE ENCOUNTER
Pt has not read [facility] message. Unable to reach patient via phone. RN left a message and instructed patient to call the clinic at [phone].    Pt will be due for an appt in clinic if requesting refills.    Tara Villanueva RN on 8/15/2024 at 9:10 AM

## 2024-08-16 NOTE — TELEPHONE ENCOUNTER
Unable to reach patient via phone. RN left a message and instructed patient to call the clinic at [phone].     Gill MCDERMOTT RN -  OB/GYN group

## 2024-08-19 RX ORDER — CLOBETASOL PROPIONATE 0.5 MG/G
OINTMENT TOPICAL AT BEDTIME
Qty: 60 G | Refills: 0 | OUTPATIENT
Start: 2024-08-19

## 2024-08-19 NOTE — TELEPHONE ENCOUNTER
[facility] message received from patient stating she doesn't use the clobetasol very often but it works well for itching and well for her eczema rashes too.

## 2024-08-19 NOTE — TELEPHONE ENCOUNTER
Attempted to call patient to offer an appointment.   Patient needs to be seen for more refills.   Unable to reach patient via phone. RN left a message and instructed patient to call the clinic at [phone].  Also, sent a [employer] message.  Closing encounter as we have attempted to reach patient 4-5 times.     Gill MCDERMOTT RN -  OB/GYN group

## 2024-09-06 NOTE — PATIENT INSTRUCTIONS
If you have any questions regarding your visit, Please contact your care team.    [facility]: [phone]      Women s Health CLINIC HOURS TELEPHONE NUMBER   Shaniqua Durand DO.    ISH Becker-Surgery Scheduler  Janae - Surgery Scheduler    LIZZ Wade RN Kylie, RN     Monday, Thursday  [place]  7am-3pm    Tuesday, Wednesday  [place]  7am-3pm    Friday  [place]  1pm-3:30pm    Typical Surgery Days: Thursday or Friday   [facility]  [address] [place] [address] [phone] Phone  [phone] Fax    [facility]  [address] [place] [address] [phone] Phone    Imaging [phone] Phone    [facility] Labor and Delivery:  [phone] Phone     **Surgeries** Our Surgery Schedulers will contact you to schedule. If you do not receive a call within 3 business days, please call [phone].    Urgent Care locations:  [facility] Saturday and    9 am - 5 pm    Monday-Friday   12 pm - 8 pm  Saturday and    9 am - 5 pm   [phone]       If you need a medication refill, please contact your pharmacy. Please allow 3 business days for your refill to be completed.  As always, Thank you for trusting us with your healthcare needs!      Healthy vulvar-vaginal hygiene practices    Avoid  Substitute    Pantyhose  Stockings with a garter belt    Thigh-high or knee-high stockings   Synthetic underwear Cotton underwear or no underwear   Jeans and other tight pants Loose pants, skirts, dresses   Swimsuits, leotards, thongs, lycra garments Loose-fitting cotton garments   Panty liners Tampons or cotton pads   Scented soaps or shampoos Fragrance-free pH neutral soap (eg, Neutrogena fragrance free, pure olive oil soap, Cetaphil gentle skin cleanser or equivalent ingredients)   Bubble  bath Tub baths in the morning and at night without additives and at a comfortable temperature   Scented detergents Unscented detergents   Washcloths Use fingertips for washing; pat dry, don't rub dry   Baby wipes or flushable wipes Rinse with water using sports water bottle or perineal irrigation bottle    Feminine sprays, douches, powders These are not necessary products and can be omitted from personal practices   Dyed toilet articles Toilet articles without dyes   Hair dryers to dry vulva skin without contact Dry vulva by gentle patting

## 2024-09-11 ENCOUNTER — OFFICE VISIT (OUTPATIENT)
Dept: OBGYN | Facility: CLINIC | Age: 44
End: 2024-09-11
Payer: COMMERCIAL

## 2024-09-11 VITALS
SYSTOLIC BLOOD PRESSURE: 120 MMHG | WEIGHT: 206.4 LBS | DIASTOLIC BLOOD PRESSURE: 80 MMHG | HEART RATE: 55 BPM | BODY MASS INDEX: 33.81 KG/M2

## 2024-09-11 DIAGNOSIS — N90.89 VULVAR IRRITATION: ICD-10-CM

## 2024-09-11 DIAGNOSIS — N95.1 MENOPAUSAL SYNDROME (HOT FLASHES): ICD-10-CM

## 2024-09-11 DIAGNOSIS — L30.9 VULVAR DERMATITIS: Primary | ICD-10-CM

## 2024-09-11 PROCEDURE — 90471 IMMUNIZATION ADMIN: CPT | Performed by: OBSTETRICS & GYNECOLOGY

## 2024-09-11 PROCEDURE — 90656 IIV3 VACC NO PRSV 0.5 ML IM: CPT | Performed by: OBSTETRICS & GYNECOLOGY

## 2024-09-11 PROCEDURE — 99214 OFFICE O/P EST MOD 30 MIN: CPT | Mod: 25 | Performed by: OBSTETRICS & GYNECOLOGY

## 2024-09-11 RX ORDER — CLOBETASOL PROPIONATE 0.5 MG/G
OINTMENT TOPICAL AT BEDTIME
Qty: 60 G | Refills: 3 | Status: SHIPPED | OUTPATIENT
Start: 2024-09-11

## 2024-09-11 RX ORDER — ESTRADIOL 0.05 MG/D
1 PATCH, EXTENDED RELEASE TRANSDERMAL
Qty: 24 PATCH | Refills: 3 | Status: SHIPPED | OUTPATIENT
Start: 2024-09-12 | End: 2025-09-12

## 2024-09-11 NOTE — PROGRESS NOTES
SUBJECTIVE:       HPI: Edda Schmidt is a 44 year old  who presents today for clobetasol and HRT follow-up    Given clobetasol for vulvar irritation. Uses intermittently; maybe only once every four months with relief.  History of eczema, atopic dermatitis.    Patient seen by Dr. Moses and Dr. Collins in  for haim menopausal symptoms. She was given estradiol patches for her symptoms. She has a history of TLH/BS in 3/2023 for AUB.    No other significant changes since last seen    Doing well on patches, sometimes forgets to change on time and has notable SE when this happens (hot flashes, headache)    Ob Hx:  s/p   OB History    Para Term  AB Living   5 2 2 0 3 2   SAB IAB Ectopic Multiple Live Births   3 0 0 0 2      # Outcome Date GA Lbr Stanley/2nd Weight Sex Type Anes PTL Lv   5 Term      CS-Unspec   ROBBIE   4 Term         ROBBIE   3 SAB      SAB      2 SAB      SAB      1 SAB      SAB       .      Gyn Hx: Patient's last menstrual period was 2023.     Last pap was prior to hysterectomy     reports that she has never smoked. She has never been exposed to tobacco smoke. She has never used smokeless tobacco.      Today's PHQ-2 Score:       2024     2:14 PM   PHQ-2 (  [employer])   Q1: Little interest or pleasure in doing things 0   Q2: Feeling down, depressed or hopeless 0   PHQ-2 Score 0   Q1: Little interest or pleasure in doing things Not at all   Q2: Feeling down, depressed or hopeless Not at all   PHQ-2 Score 0     Today's PHQ-9 Score:       2023     1:34 PM   PHQ-9 SCORE   PHQ-9 Total Score 0     Today's WILL-7 Score:       2022     6:43 AM   WILL-7 SCORE   Total Score 1 (minimal anxiety)       Problem list and histories reviewed & adjusted, as indicated.  Additional history: as documented.    Patient Active Problem List   Diagnosis    Asthma    Menstrual migraine    Left-sided low back pain with left-sided sciatica    Car sickness    Segmental  dysfunction of cervical region    Segmental dysfunction of thoracic region    Segmental dysfunction of sacral region    Mechanical back pain    S/P laparoscopic hysterectomy    H/O bilateral salpingectomy    Family history of malignant neoplasm of breast    Menopausal syndrome (hot flashes)    Hair loss     Past Surgical History:   Procedure Laterality Date    ABDOMEN SURGERY  2002        APPENDECTOMY      CYSTOSCOPY, SLING TRANSVAGINAL N/A 02/15/2022    Procedure: CREATION, VAGINAL SLING, WITH CYSTOSCOPY  (Support the urethra with mesh and look in the bladder);  Surgeon: Jeremy Sherman MD;  Location: [facility] OR    ENT SURGERY      Tonsillectomy    GENITOURINARY SURGERY      Vaginal sling    GYN SURGERY      Hysterectomy    HYSTERECTOMY  2023    HYSTERECTOMY, PAP NO LONGER INDICATED      INJECT EPIDURAL LUMBAR Left 05/10/2019    Procedure: left sided Lumbar 4-5 translaminar injection;  Surgeon: Jordy Tanner MD;  Location: [facility] OR    LAPAROSCOPIC HYSTERECTOMY TOTAL, SALPINGECTOMY BILATERAL Bilateral 2023    TLH with B/L salpingectomy.  Menometrorrhagia    VASCULAR SURGERY      Vericose vein stripping      Social History     Tobacco Use    Smoking status: Never     Passive exposure: Never    Smokeless tobacco: Never    Tobacco comments:     Vape   Substance Use Topics    Alcohol use: Not Currently     Comment: Last drink       Problem (# of Occurrences) Relation (Name,Age of Onset)    Depression (1) Mother (Shruthi Olmos)    Diabetes (1) Father (Ramu)    Hypertension (1) Father (Ramu)              Current Outpatient Medications   Medication Sig Dispense Refill    clobetasol (TEMOVATE) 0.05 % external ointment Apply topically at bedtime. 60 g 3    diclofenac (VOLTAREN) 1 % topical gel Apply 2 g topically 4 times daily 50 g 1    [START ON 2024] estradiol (VIVELLE-DOT) 0.05 MG/24HR bi-weekly patch Place 1 patch onto the skin twice a week. 24 patch 3    methocarbamol  (ROBAXIN) 750 MG tablet Take 1 tablet (750 mg) by mouth 4 times daily as needed for muscle spasms 30 tablet 3    ondansetron (ZOFRAN) 8 MG tablet Take 1 tablet (8 mg) by mouth every 8 hours as needed for nausea 30 tablet 1    phentermine (ADIPEX-P) 37.5 MG capsule Take 1 capsule (37.5 mg) by mouth every morning 30 capsule 2    venlafaxine (EFFEXOR XR) 150 MG 24 hr capsule Take 1 capsule (150 mg) by mouth daily 90 capsule 1    albuterol (PROAIR HFA/PROVENTIL HFA/VENTOLIN HFA) 108 (90 Base) MCG/ACT inhaler Inhale 2 puffs into the lungs every 6 hours as needed for shortness of breath, wheezing or cough (Patient not taking: Reported on 11/29/2023) 18 g 1     No current facility-administered medications for this visit.     No Known Allergies    ROS:  10 Point review of systems negative other noted above in HPI    OBJECTIVE:     /80   Pulse 55   Wt 93.6 kg (206 lb 6.4 oz)   LMP 01/19/2023   BMI 33.81 kg/m    Body mass index is 33.81 kg/m .      Gen: Alert, oriented, appropriately interactive, NAD  Resp: no audible wheeze, cough, or visible cyanosis.  No visible retractions or increased work of breathing.  Able to speak fully in complete sentences.  Abdomen: soft, non tender, non distended  External genitalia: no lesions; normal appearing external genitalia, bartholins glands, urethra, skenes glands  Vagina: no masses or lesions or discharge, normally rugated.  Cervix: surgically absent  Bimanual exam:   Nontender pelvic floor muscles  Urethra: nontender   Bladder: nontender and without massess, well supported   Uterus: surgically absent  Adnexa: no masses or tenderness appreciated   Psych: mentation appears normal, affect normal/bright, judgement and insight intact, normal speech and appearance well-groomed      In-Clinic Test Results:  No results found for this or any previous visit (from the past 24 hour(s)).    ASSESSMENT/PLAN:                                                      Edda Schmidt is a 44  "year old  who presents today for clobetasol follow-up      ICD-10-CM    1. Vulvar dermatitis  L30.9       2. Vulvar irritation  N90.89 clobetasol (TEMOVATE) 0.05 % external ointment      3. Menopausal syndrome (hot flashes)  N95.1 estradiol (VIVELLE-DOT) 0.05 MG/24HR bi-weekly patch          Exam unremarkable today. Recommend annual exams, sooner if persistent or recurrent/worsening symptoms.    Discussed vulvar and vaginal hygiene practices, and list of practices given to patient. Advised to avoid Over the counter \"feminine\" soaps/sprays. Instead, recommend Cetaphil cleanser or other gentle ph-neutral cleanser. No need to use any products inside of the vagina.       Doing well on HRT, refills sent. Labs UTD. No contraindications.      Refill sent. All questions answered. Red flag symptoms reviewed.      Shaniqua Durand, DO  [facility]    "

## 2024-09-30 ENCOUNTER — MYC MEDICAL ADVICE (OUTPATIENT)
Dept: OBGYN | Facility: CLINIC | Age: 44
End: 2024-09-30
Payer: COMMERCIAL

## 2024-09-30 ENCOUNTER — MYC MEDICAL ADVICE (OUTPATIENT)
Dept: FAMILY MEDICINE | Facility: OTHER | Age: 44
End: 2024-09-30
Payer: COMMERCIAL

## 2024-10-01 NOTE — TELEPHONE ENCOUNTER
Patient seen in ED 2/25/24 for rash/exzema. Prescribed prednisone taper at that time. Do you want patient seen or okay to prescribe another taper?    Maira Blancas, RN, BSN

## 2024-10-14 ENCOUNTER — HOSPITAL ENCOUNTER (EMERGENCY)
Facility: CLINIC | Age: 44
Discharge: HOME | End: 2024-10-14
Attending: NURSE PRACTITIONER | Admitting: NURSE PRACTITIONER
Payer: COMMERCIAL

## 2024-10-14 VITALS
SYSTOLIC BLOOD PRESSURE: 149 MMHG | DIASTOLIC BLOOD PRESSURE: 101 MMHG | WEIGHT: 205 LBS | BODY MASS INDEX: 34.16 KG/M2 | HEIGHT: 65 IN | HEART RATE: 69 BPM | TEMPERATURE: 98 F | RESPIRATION RATE: 19 BRPM | OXYGEN SATURATION: 99 %

## 2024-10-14 DIAGNOSIS — R21 FACIAL RASH: ICD-10-CM

## 2024-10-14 DIAGNOSIS — T75.3XXD CAR SICKNESS, SUBSEQUENT ENCOUNTER: ICD-10-CM

## 2024-10-14 PROCEDURE — 99283 EMERGENCY DEPT VISIT LOW MDM: CPT | Performed by: NURSE PRACTITIONER

## 2024-10-14 RX ORDER — PREDNISONE 20 MG/1
TABLET ORAL
Qty: 10 TABLET | Refills: 0 | Status: SHIPPED | OUTPATIENT
Start: 2024-10-14

## 2024-10-14 ASSESSMENT — ACTIVITIES OF DAILY LIVING (ADL): ADLS_ACUITY_SCORE: 35

## 2024-10-14 ASSESSMENT — COLUMBIA-SUICIDE SEVERITY RATING SCALE - C-SSRS
1. IN THE PAST MONTH, HAVE YOU WISHED YOU WERE DEAD OR WISHED YOU COULD GO TO SLEEP AND NOT WAKE UP?: NO
6. HAVE YOU EVER DONE ANYTHING, STARTED TO DO ANYTHING, OR PREPARED TO DO ANYTHING TO END YOUR LIFE?: NO
2. HAVE YOU ACTUALLY HAD ANY THOUGHTS OF KILLING YOURSELF IN THE PAST MONTH?: NO

## 2024-10-14 NOTE — ED TRIAGE NOTES
PT here with reports of rash over the entire face. PT states that this has been going on for 9 months, but in the last 24 HRS it got worse , PT states that steroids calms it down. NKDA.

## 2024-10-14 NOTE — ED PROVIDER NOTES
"  History     Chief Complaint   Patient presents with    Rash     HPI  Edda Schmidt is a 44 year old female who presents for evaluation of facial rash.  Patient reports frequent episodes of a red, bumpy rash that has been occurring over different parts of her body for the last 9 months.  She does also have problems with eczema.  Over the last 2 days she is developed a rash on her face.  She has never had this on her face before.  The rash is bumpy, \"like small little pimples\" slightly painful.  The rash is not pruritic. No fevers.  No new face or hair products.  Using CeraVe or Vanicream on face only.  She has stopped use of make-up products.  She has appointment with dermatology, but they are not able to see her until April 2025.  Allergies:  No Known Allergies    Problem List:    Patient Active Problem List    Diagnosis Date Noted    Menopausal syndrome (hot flashes) 10/16/2023     Priority: Medium    Hair loss 10/16/2023     Priority: Medium    Family history of malignant neoplasm of breast 06/14/2023     Priority: Medium    H/O bilateral salpingectomy 03/17/2023     Priority: Medium    S/P laparoscopic hysterectomy 02/17/2023     Priority: Medium    Segmental dysfunction of cervical region 05/24/2019     Priority: Medium    Segmental dysfunction of thoracic region 05/24/2019     Priority: Medium    Segmental dysfunction of sacral region 05/24/2019     Priority: Medium    Mechanical back pain 05/24/2019     Priority: Medium    Menstrual migraine 01/31/2019     Priority: Medium    Left-sided low back pain with left-sided sciatica 01/31/2019     Priority: Medium    Car sickness 01/31/2019     Priority: Medium    Asthma 05/12/2011     Priority: Medium     Overview:   Asthma  mild intermittant          Past Medical History:    Past Medical History:   Diagnosis Date    Arthritis 2019    Car sickness     Motion sickness     Uncomplicated asthma [employer]       Past Surgical History:    Past Surgical History: "   Procedure Laterality Date    ABDOMEN SURGERY  2002        APPENDECTOMY      CYSTOSCOPY, SLING TRANSVAGINAL N/A 02/15/2022    Procedure: CREATION, VAGINAL SLING, WITH CYSTOSCOPY  (Support the urethra with mesh and look in the bladder);  Surgeon: Jeremy Sherman MD;  Location: [facility] OR    ENT SURGERY      Tonsillectomy    GENITOURINARY SURGERY      Vaginal sling    GYN SURGERY      Hysterectomy    HYSTERECTOMY  2023    HYSTERECTOMY, PAP NO LONGER INDICATED      INJECT EPIDURAL LUMBAR Left 05/10/2019    Procedure: left sided Lumbar 4-5 translaminar injection;  Surgeon: Jordy Tanner MD;  Location: [facility] OR    LAPAROSCOPIC HYSTERECTOMY TOTAL, SALPINGECTOMY BILATERAL Bilateral 2023    TLH with B/L salpingectomy.  Menometrorrhagia    VASCULAR SURGERY      Vericose vein stripping       Family History:    Family History   Problem Relation Age of Onset    Depression Mother     Diabetes Father     Hypertension Father        Social History:  Marital Status:   [4]  Social History     Tobacco Use    Smoking status: Never     Passive exposure: Never    Smokeless tobacco: Never    Tobacco comments:     Vape   Vaping Use    Vaping status: Never Used   Substance Use Topics    Alcohol use: Not Currently     Comment: Last drink     Drug use: No        Medications:    predniSONE (DELTASONE) 20 MG tablet  albuterol (PROAIR HFA/PROVENTIL HFA/VENTOLIN HFA) 108 (90 Base) MCG/ACT inhaler  clobetasol (TEMOVATE) 0.05 % external ointment  diclofenac (VOLTAREN) 1 % topical gel  estradiol (VIVELLE-DOT) 0.05 MG/24HR bi-weekly patch  methocarbamol (ROBAXIN) 750 MG tablet  ondansetron (ZOFRAN) 8 MG tablet  phentermine (ADIPEX-P) 37.5 MG capsule  venlafaxine (EFFEXOR XR) 150 MG 24 hr capsule          Review of Systems  As mentioned above in the history present illness. All other systems were reviewed and are negative.    Physical Exam   BP: (!) 149/101  Pulse: 69  Temp: 98  F (36.7  C)  Resp:  "19  Height: 165.1 cm (5' 5\")  Weight: 93 kg (205 lb)  SpO2: 99 %      Physical Exam  Constitutional:       General: She is not in acute distress.     Appearance: She is not ill-appearing.   Cardiovascular:      Rate and Rhythm: Normal rate.   Pulmonary:      Effort: Pulmonary effort is normal.   Skin:     Findings: Rash (erythematous, papular rash, appears quite inflamed.) present.   Neurological:      Mental Status: She is alert.         ED Course        Procedures         No results found for this or any previous visit (from the past 24 hour(s)).    Medications - No data to display    Assessments & Plan (with Medical Decision Making)     Urticarial appearing facial rash.  Unclear etiology.  Does not have the appearance of cellulitis.  No vesicles or pattern to suggest herpes zoster infection.  Does not have yellow crusting lesions or wheezing to suggest an impetigo.  Patient be treated with a course of prednisone.  She was instructed to follow-up with dermatology and hopefully if they have a cancellation could see her sooner.  I suggested calling them to send pictures of what the rash looks like now.      Discharge Medication List as of 10/14/2024  3:21 PM        START taking these medications    Details   predniSONE (DELTASONE) 20 MG tablet Take two tablets (= 40mg) each day for 5 (five) days, Disp-10 tablet, R-0, E-Prescribe             Final diagnoses:   Facial rash       10/14/2024   [facility] EMERGENCY DEPT       Marie Woodruff APRN CNP  10/14/24 1508    "

## 2024-10-15 RX ORDER — ONDANSETRON 8 MG/1
8 TABLET, FILM COATED ORAL EVERY 8 HOURS PRN
Qty: 30 TABLET | Refills: 0 | Status: SHIPPED | OUTPATIENT
Start: 2024-10-15

## 2024-11-27 ENCOUNTER — ALLIED HEALTH/NURSE VISIT (OUTPATIENT)
Dept: FAMILY MEDICINE | Facility: CLINIC | Age: 44
End: 2024-11-27
Payer: COMMERCIAL

## 2024-11-27 VITALS
SYSTOLIC BLOOD PRESSURE: 122 MMHG | DIASTOLIC BLOOD PRESSURE: 72 MMHG | WEIGHT: 188 LBS | BODY MASS INDEX: 31.32 KG/M2 | OXYGEN SATURATION: 98 % | HEIGHT: 65 IN | HEART RATE: 62 BPM

## 2024-11-27 DIAGNOSIS — Z01.30 BLOOD PRESSURE CHECK: Primary | ICD-10-CM

## 2024-11-27 PROCEDURE — 99207 PR NO CHARGE NURSE ONLY: CPT

## 2024-11-27 NOTE — PROGRESS NOTES
"Edda Schmidt is a 44 year old patient who comes in today for a Blood Pressure check.  Initial BP:  /72   Pulse 62   Ht 1.654 m (5' 5.12\")   Wt 85.3 kg (188 lb)   LMP 01/19/2023   SpO2 98%   BMI 31.17 kg/m       Data Unavailable  Disposition: follow-up as previously indicated by provider.        Roseanna Weber MA      "

## 2024-12-01 ASSESSMENT — ASTHMA QUESTIONNAIRES
QUESTION_2 LAST FOUR WEEKS HOW OFTEN HAVE YOU HAD SHORTNESS OF BREATH: NOT AT ALL
ACT_TOTALSCORE: 25
QUESTION_3 LAST FOUR WEEKS HOW OFTEN DID YOUR ASTHMA SYMPTOMS (WHEEZING, COUGHING, SHORTNESS OF BREATH, CHEST TIGHTNESS OR PAIN) WAKE YOU UP AT NIGHT OR EARLIER THAN USUAL IN THE MORNING: NOT AT ALL
QUESTION_4 LAST FOUR WEEKS HOW OFTEN HAVE YOU USED YOUR RESCUE INHALER OR NEBULIZER MEDICATION (SUCH AS ALBUTEROL): NOT AT ALL
QUESTION_5 LAST FOUR WEEKS HOW WOULD YOU RATE YOUR ASTHMA CONTROL: COMPLETELY CONTROLLED
ACT_TOTALSCORE: 25
QUESTION_1 LAST FOUR WEEKS HOW MUCH OF THE TIME DID YOUR ASTHMA KEEP YOU FROM GETTING AS MUCH DONE AT WORK, SCHOOL OR AT HOME: NONE OF THE TIME

## 2024-12-04 ENCOUNTER — OFFICE VISIT (OUTPATIENT)
Dept: FAMILY MEDICINE | Facility: OTHER | Age: 44
End: 2024-12-04
Payer: COMMERCIAL

## 2024-12-04 VITALS
WEIGHT: 189.5 LBS | DIASTOLIC BLOOD PRESSURE: 81 MMHG | TEMPERATURE: 97.6 F | OXYGEN SATURATION: 96 % | BODY MASS INDEX: 31.42 KG/M2 | SYSTOLIC BLOOD PRESSURE: 130 MMHG | HEART RATE: 63 BPM | RESPIRATION RATE: 14 BRPM

## 2024-12-04 DIAGNOSIS — R45.86 MOOD SWINGS: ICD-10-CM

## 2024-12-04 DIAGNOSIS — T75.3XXD CAR SICKNESS, SUBSEQUENT ENCOUNTER: ICD-10-CM

## 2024-12-04 DIAGNOSIS — E66.812 CLASS 2 OBESITY DUE TO EXCESS CALORIES WITHOUT SERIOUS COMORBIDITY WITH BODY MASS INDEX (BMI) OF 36.0 TO 36.9 IN ADULT: Primary | ICD-10-CM

## 2024-12-04 DIAGNOSIS — E66.09 CLASS 2 OBESITY DUE TO EXCESS CALORIES WITHOUT SERIOUS COMORBIDITY WITH BODY MASS INDEX (BMI) OF 36.0 TO 36.9 IN ADULT: Primary | ICD-10-CM

## 2024-12-04 PROCEDURE — 90480 ADMN SARSCOV2 VAC 1/ONLY CMP: CPT | Performed by: FAMILY MEDICINE

## 2024-12-04 PROCEDURE — 91320 SARSCV2 VAC 30MCG TRS-SUC IM: CPT | Performed by: FAMILY MEDICINE

## 2024-12-04 PROCEDURE — 99215 OFFICE O/P EST HI 40 MIN: CPT | Mod: 25 | Performed by: FAMILY MEDICINE

## 2024-12-04 RX ORDER — VENLAFAXINE HYDROCHLORIDE 150 MG/1
150 CAPSULE, EXTENDED RELEASE ORAL DAILY
Qty: 90 CAPSULE | Refills: 1 | Status: SHIPPED | OUTPATIENT
Start: 2024-12-04

## 2024-12-04 RX ORDER — ONDANSETRON 8 MG/1
8 TABLET, FILM COATED ORAL EVERY 8 HOURS PRN
Qty: 30 TABLET | Refills: 2 | Status: SHIPPED | OUTPATIENT
Start: 2024-12-04

## 2024-12-04 ASSESSMENT — PAIN SCALES - GENERAL: PAINLEVEL_OUTOF10: NO PAIN (0)

## 2025-01-13 ENCOUNTER — MYC REFILL (OUTPATIENT)
Dept: OBGYN | Facility: CLINIC | Age: 45
End: 2025-01-13
Payer: COMMERCIAL

## 2025-01-13 ENCOUNTER — MYC REFILL (OUTPATIENT)
Dept: FAMILY MEDICINE | Facility: OTHER | Age: 45
End: 2025-01-13
Payer: COMMERCIAL

## 2025-01-13 DIAGNOSIS — M54.42 LEFT-SIDED LOW BACK PAIN WITH LEFT-SIDED SCIATICA, UNSPECIFIED CHRONICITY: ICD-10-CM

## 2025-01-13 DIAGNOSIS — N95.1 MENOPAUSAL SYNDROME (HOT FLASHES): ICD-10-CM

## 2025-01-13 RX ORDER — ESTRADIOL 0.05 MG/D
1 PATCH, EXTENDED RELEASE TRANSDERMAL
Qty: 24 PATCH | Refills: 3 | Status: CANCELLED | OUTPATIENT
Start: 2025-01-13

## 2025-01-13 NOTE — TELEPHONE ENCOUNTER
Vivelle-Dot was prescribed for pt on 9/11/24 for a 3 month supply (24 patches) with 3 additional refills.    Pt needs to reach out to pharmacy for refills.    Sheri Addison RN- OB/GYN group

## 2025-01-13 NOTE — TELEPHONE ENCOUNTER
Clinic RN: Please investigate patient's chart or contact patient if the information cannot be found because this medication was prescribed for an acute condition. Confirm current symptoms/need for medication and possible need for appointment. If necessary, document reason and route refill encounter to provider for approval or denial.    Rupa Moran, FRANCYN, RN

## 2025-01-14 NOTE — TELEPHONE ENCOUNTER
Clinic RN: Please investigate patient's chart or contact patient if the information cannot be found because patient should have run out of this medication on 1/15/23. Confirm patient is taking this medication as prescribed. Document findings and route refill encounter to provider for approval or denial.    Jordy Barron, RN, BSN, MSN  RN Lead

## 2025-01-15 NOTE — TELEPHONE ENCOUNTER
See patient response:  [facility] Medication Refill Pool - Primary Care (supporting Rosetta Nicole MD)Yesterday (2:39 PM)     BH  Used very infrequently for muscle relaxer. I have been getting headaches more frequently. I am working with my chiropractor and a massage therapist as my neck and shoulders are very tight, but the meds help.       LIZZ Torresesterday (12:38 PM)     MARCO Vernon,  your med list reflects this med was last prescribed in 2022.  How often are you using this med and for what reason?  Is the concern/reason for med a chronic condition?  Please advise.  Thank you.  Verenice JUAN RN

## 2025-01-16 RX ORDER — METHOCARBAMOL 750 MG/1
750 TABLET, FILM COATED ORAL 4 TIMES DAILY PRN
Qty: 30 TABLET | Refills: 3 | Status: SHIPPED | OUTPATIENT
Start: 2025-01-16

## 2025-01-17 ENCOUNTER — HOSPITAL ENCOUNTER (OUTPATIENT)
Dept: MAMMOGRAPHY | Facility: CLINIC | Age: 45
Discharge: HOME | End: 2025-01-17
Attending: FAMILY MEDICINE | Admitting: FAMILY MEDICINE
Payer: COMMERCIAL

## 2025-01-17 DIAGNOSIS — Z12.31 VISIT FOR SCREENING MAMMOGRAM: ICD-10-CM

## 2025-01-17 PROCEDURE — 77063 BREAST TOMOSYNTHESIS BI: CPT

## 2025-01-17 PROCEDURE — 77067 SCR MAMMO BI INCL CAD: CPT

## 2025-03-07 ENCOUNTER — ANCILLARY PROCEDURE (OUTPATIENT)
Dept: GENERAL RADIOLOGY | Facility: CLINIC | Age: 45
End: 2025-03-07
Attending: PODIATRIST
Payer: COMMERCIAL

## 2025-03-07 PROCEDURE — 73610 X-RAY EXAM OF ANKLE: CPT | Mod: TC | Performed by: RADIOLOGY

## 2025-05-13 SDOH — HEALTH STABILITY: PHYSICAL HEALTH: ON AVERAGE, HOW MANY DAYS PER WEEK DO YOU ENGAGE IN MODERATE TO STRENUOUS EXERCISE (LIKE A BRISK WALK)?: 3 DAYS

## 2025-05-13 SDOH — HEALTH STABILITY: PHYSICAL HEALTH: ON AVERAGE, HOW MANY MINUTES DO YOU ENGAGE IN EXERCISE AT THIS LEVEL?: 60 MIN

## 2025-05-13 ASSESSMENT — SOCIAL DETERMINANTS OF HEALTH (SDOH): HOW OFTEN DO YOU GET TOGETHER WITH FRIENDS OR RELATIVES?: ONCE A WEEK

## 2025-05-14 ENCOUNTER — OFFICE VISIT (OUTPATIENT)
Dept: FAMILY MEDICINE | Facility: OTHER | Age: 45
End: 2025-05-14
Payer: COMMERCIAL

## 2025-05-14 VITALS
HEART RATE: 66 BPM | RESPIRATION RATE: 14 BRPM | TEMPERATURE: 98.2 F | WEIGHT: 160 LBS | SYSTOLIC BLOOD PRESSURE: 116 MMHG | DIASTOLIC BLOOD PRESSURE: 81 MMHG | HEIGHT: 65 IN | BODY MASS INDEX: 26.66 KG/M2 | OXYGEN SATURATION: 99 %

## 2025-05-14 DIAGNOSIS — Z12.11 SCREEN FOR COLON CANCER: ICD-10-CM

## 2025-05-14 DIAGNOSIS — L65.9 HAIR LOSS: ICD-10-CM

## 2025-05-14 DIAGNOSIS — Z00.00 ROUTINE GENERAL MEDICAL EXAMINATION AT A HEALTH CARE FACILITY: Primary | ICD-10-CM

## 2025-05-14 LAB
ALBUMIN SERPL BCG-MCNC: 4.3 G/DL (ref 3.5–5.2)
ALP SERPL-CCNC: 58 U/L (ref 40–150)
ALT SERPL W P-5'-P-CCNC: 10 U/L (ref 0–50)
ANION GAP SERPL CALCULATED.3IONS-SCNC: 9 MMOL/L (ref 7–15)
AST SERPL W P-5'-P-CCNC: 12 U/L (ref 0–45)
BILIRUB SERPL-MCNC: 0.3 MG/DL
BUN SERPL-MCNC: 5.7 MG/DL (ref 6–20)
CALCIUM SERPL-MCNC: 9.3 MG/DL (ref 8.8–10.4)
CHLORIDE SERPL-SCNC: 105 MMOL/L (ref 98–107)
CREAT SERPL-MCNC: 0.61 MG/DL (ref 0.51–0.95)
EGFRCR SERPLBLD CKD-EPI 2021: >90 ML/MIN/1.73M2
GLUCOSE SERPL-MCNC: 75 MG/DL (ref 70–99)
HCO3 SERPL-SCNC: 28 MMOL/L (ref 22–29)
POTASSIUM SERPL-SCNC: 3.8 MMOL/L (ref 3.4–5.3)
PROT SERPL-MCNC: 7.7 G/DL (ref 6.4–8.3)
SODIUM SERPL-SCNC: 142 MMOL/L (ref 135–145)
TSH SERPL DL<=0.005 MIU/L-ACNC: 1.08 UIU/ML (ref 0.3–4.2)

## 2025-05-14 PROCEDURE — 3074F SYST BP LT 130 MM HG: CPT | Performed by: FAMILY MEDICINE

## 2025-05-14 PROCEDURE — 80053 COMPREHEN METABOLIC PANEL: CPT | Performed by: FAMILY MEDICINE

## 2025-05-14 PROCEDURE — 1126F AMNT PAIN NOTED NONE PRSNT: CPT | Performed by: FAMILY MEDICINE

## 2025-05-14 PROCEDURE — 36415 COLL VENOUS BLD VENIPUNCTURE: CPT | Performed by: FAMILY MEDICINE

## 2025-05-14 PROCEDURE — 3079F DIAST BP 80-89 MM HG: CPT | Performed by: FAMILY MEDICINE

## 2025-05-14 PROCEDURE — 99213 OFFICE O/P EST LOW 20 MIN: CPT | Mod: 25 | Performed by: FAMILY MEDICINE

## 2025-05-14 PROCEDURE — 99396 PREV VISIT EST AGE 40-64: CPT | Performed by: FAMILY MEDICINE

## 2025-05-14 PROCEDURE — 84443 ASSAY THYROID STIM HORMONE: CPT | Performed by: FAMILY MEDICINE

## 2025-05-14 ASSESSMENT — PAIN SCALES - GENERAL: PAINLEVEL_OUTOF10: NO PAIN (0)

## 2025-05-14 NOTE — PATIENT INSTRUCTIONS
Patient Education   Preventive Care Advice   This is general advice given by our system to help you stay healthy. However, your care team may have specific advice just for you. Please talk to your care team about your preventive care needs.  Nutrition  Eat 5 or more servings of fruits and vegetables each day.  Try wheat bread, brown rice and whole grain pasta (instead of white bread, rice, and pasta).  Get enough calcium and vitamin D. Check the label on foods and aim for 100% of the RDA (recommended daily allowance).  Lifestyle  Exercise at least 150 minutes each week  (30 minutes a day, 5 days a week).  Do muscle strengthening activities 2 days a week. These help control your weight and prevent disease.  No smoking.  Wear sunscreen to prevent skin cancer.  Have a dental exam and cleaning every 6 months.  Yearly exams  See your health care team every year to talk about:  Any changes in your health.  Any medicines your care team has prescribed.  Preventive care, family planning, and ways to prevent chronic diseases.  Shots (vaccines)   HPV shots (up to age 26), if you've never had them before.  Hepatitis B shots (up to age 59), if you've never had them before.  COVID-19 shot: Get this shot when it's due.  Flu shot: Get a flu shot every year.  Tetanus shot: Get a tetanus shot every 10 years.  Pneumococcal, hepatitis A, and RSV shots: Ask your care team if you need these based on your risk.  Shingles shot (for age 50 and up)  General health tests  Diabetes screening:  Starting at age 35, Get screened for diabetes at least every 3 years.  If you are younger than age 35, ask your care team if you should be screened for diabetes.  Cholesterol test: At age 39, start having a cholesterol test every 5 years, or more often if advised.  Bone density scan (DEXA): At age 50, ask your care team if you should have this scan for osteoporosis (brittle bones).  Hepatitis C: Get tested at least once in your life.  STIs (sexually  transmitted infections)  Before age 24: Ask your care team if you should be screened for STIs.  After age 24: Get screened for STIs if you're at risk. You are at risk for STIs (including HIV) if:  You are sexually active with more than one person.  You don't use condoms every time.  You or a partner was diagnosed with a sexually transmitted infection.  If you are at risk for HIV, ask about PrEP medicine to prevent HIV.  Get tested for HIV at least once in your life, whether you are at risk for HIV or not.  Cancer screening tests  Cervical cancer screening: If you have a cervix, begin getting regular cervical cancer screening tests starting at age 21.  Breast cancer scan (mammogram): If you've ever had breasts, begin having regular mammograms starting at age 40. This is a scan to check for breast cancer.  Colon cancer screening: It is important to start screening for colon cancer at age 45.  Have a colonoscopy test every 10 years (or more often if you're at risk) Or, ask your provider about stool tests like a FIT test every year or Cologuard test every 3 years.  To learn more about your testing options, visit:   .  For help making a decision, visit:   https://bit.ly/kc61250.  Prostate cancer screening test: If you have a prostate, ask your care team if a prostate cancer screening test (PSA) at age 55 is right for you.  Lung cancer screening: If you are a current or former smoker ages 50 to 80, ask your care team if ongoing lung cancer screenings are right for you.  For informational purposes only. Not to replace the advice of your health care provider. Copyright   2023 [employer]. All rights reserved. Clinically reviewed by the [facility] Transitions Program. [employer] [id number] - REV 01/24.

## 2025-05-14 NOTE — PROGRESS NOTES
Preventive Care Visit  [facility]  Rosetta Nicole MD, MD, Family Medicine  May 14, 2025      Assessment & Plan         ICD-10-CM    1. Routine general medical examination at a health care facility  Z00.00       2. Screen for colon cancer  Z12.11 Colonoscopy Screening  Referral      3. Hair loss  L65.9 Comprehensive metabolic panel (BMP + Alb, Alk Phos, ALT, AST, Total. Bili, TP)     TSH with free T4 reflex     Comprehensive metabolic panel (BMP + Alb, Alk Phos, ALT, AST, Total. Bili, TP)     TSH with free T4 reflex          Consent was obtained from the patient to use an AI documentation tool in the creation of this note.    Assessment & Plan  Hair Loss  - Hair loss likely related to hormonal changes now. New hair growth observed and at the length where she started slowing on GLP1 dosing, indicating improvement. No scalp irritation noted.  - Order labs to check for any underlying issues. Consider topical minoxidil if labs are normal and hair loss persists though clinically it is looking to be improving and she is continuing to wean the GLP1. Option to consult with a hair loss specialist in dermatology if interested as well.  - Patient is taking a prenatal vitamin supplement for hair and nails, which may support hair regrowth. Will let us know if she wants more moving forward for choices    Abdominal Pulse Sensation  - Pulse sensation in the abdomen likely due to positioning and empty stomach. No bruit detected, reassuring against vascular issues.  - Consider ultrasound to rule out any underlying issues if pulse sensation persists.  - No family history of aneurysms reported, but an ultrasound may be considered for further reassurance.    Weight Management  - Weight loss of 10 lbs since February, approaching normal BMI. No nutritional secondary issues observed.  - Consider reducing medication dosage as weight approaches normal BMI for maintenance planning. Monitor electrolytes and enzymes  through labs to prevent potential anorexia-like symptoms.  - Patient is currently on Ozempic, and dosage may be adjusted as weight goals are achieved.    General Health and Preventive Care  - Due for a colonoscopy.  - Patient has refilled nausea medication and Effexor, and reports no need for additional refills at this time.        No LOS data to display   Time spent by me today doing chart review, history and exam, documentation and further activities per the note    Rosetta Nicole MD     Patient has been advised of split billing requirements and indicates understanding: Yes        Counseling  Appropriate preventive services were addressed with this patient via screening, questionnaire, or discussion as appropriate for fall prevention, nutrition, physical activity, Tobacco-use cessation, social engagement, weight loss and cognition.  Checklist reviewing preventive services available has been given to the patient.  Reviewed patient's diet, addressing concerns and/or questions.   She is at risk for lack of exercise and has been provided with information to increase physical activity for the benefit of her well-being.       Follow-up    Follow-up Visit   Expected date:  May 14, 2026 (Approximate)      Follow Up Appointment Details:     Follow-up with whom?: PCP    Follow-Up for what?: Adult Preventive    How?: In Person                 Sheryl Vernon is a 45 year old, presenting for the following:  Physical        5/14/2025     1:40 PM   Additional Questions   Roomed by dasia   Accompanied by self          HPI  Minoxidil  - Experienced hair loss starting around October or November, with noticeable hair loss occurring over a couple of months.  - Reports pulling out lumps of hair daily, with no noticeable slowing of hair loss since January.  - Observed new hair growth approximately 2 inches long, indicating some improvement.  - Concerned about feeling a pulse in the abdomen while lying down, which was a new  experience.        Advance Care Planning    Discussed advance care planning with patient; informed AVS has link to [facility].        5/13/2025   General Health   How would you rate your overall physical health? Good   Feel stress (tense, anxious, or unable to sleep) Only a little   (!) STRESS CONCERN      5/13/2025   Nutrition   Three or more servings of calcium each day? Yes   Diet: Regular (no restrictions)   How many servings of fruit and vegetables per day? 4 or more   How many sweetened beverages each day? 0-1         5/13/2025   Exercise   Days per week of moderate/strenous exercise 3 days   Average minutes spent exercising at this level 60 min         5/13/2025   Social Factors   Frequency of gathering with friends or relatives Once a week   Worry food won't last until get money to buy more No   Food not last or not have enough money for food? No   Do you have housing? (Housing is defined as stable permanent housing and does not include staying outside in a car, in a tent, in an abandoned building, in an overnight shelter, or couch-surfing.) Yes   Are you worried about losing your housing? No   Lack of transportation? No   Unable to get utilities (heat,electricity)? No         5/13/2025   Dental   Dentist two times every year? Yes         Today's PHQ-2 Score:       5/13/2025     3:06 PM   PHQ-2 ( 1999 [employer])   Q1: Little interest or pleasure in doing things 0   Q2: Feeling down, depressed or hopeless 0   PHQ-2 Score 0    Q1: Little interest or pleasure in doing things Not at all   Q2: Feeling down, depressed or hopeless Not at all   PHQ-2 Score 0       Patient-reported           5/13/2025   Substance Use   Alcohol more than 3/day or more than 7/wk Not Applicable   Do you use any other substances recreationally? No     Social History     Tobacco Use    Smoking status: Never     Passive exposure: Never    Smokeless tobacco: Never    Tobacco comments:     Vape   Vaping Use    Vaping status: Never Used  "  Substance Use Topics    Alcohol use: Not Currently     Comment: Last drink 1/23    Drug use: No           1/17/2025   LAST FHS-7 RESULTS   1st degree relative breast or ovarian cancer No   Any relative bilateral breast cancer No   Any male have breast cancer No   Any ONE woman have BOTH breast AND ovarian cancer No   Any woman with breast cancer before 50yrs No   2 or more relatives with breast AND/OR ovarian cancer No   2 or more relatives with breast AND/OR bowel cancer No        Mammogram Screening - Mammogram every 1-2 years updated in Health Maintenance based on mutual decision making        5/13/2025   STI Screening   New sexual partner(s) since last STI/HIV test? No     History of abnormal Pap smear: No - age 30- 64 PAP with HPV every 5 years recommended        Latest Ref Rng & Units 1/31/2019    10:25 AM 1/31/2019    10:15 AM   PAP / HPV   PAP (Historical)  NIL     HPV 16 DNA NEG^Negative  Negative    HPV 18 DNA NEG^Negative  Negative    Other HR HPV NEG^Negative  Negative      ASCVD Risk   The 10-year ASCVD risk score (Hussein MENDEZ, et al., 2019) is: 0.5%    Values used to calculate the score:      Age: 45 years      Sex: Female      Is Non- : No      Diabetic: No      Tobacco smoker: No      Systolic Blood Pressure: 116 mmHg      Is BP treated: No      HDL Cholesterol: 54 mg/dL      Total Cholesterol: 157 mg/dL       Reviewed and updated as needed this visit by Provider   Tobacco  Allergies  Meds  Problems  Med Hx  Surg Hx  Fam Hx                  Review of Systems  Constitutional, HEENT, cardiovascular, pulmonary, GI, , musculoskeletal, neuro, skin, endocrine and psych systems are negative, except as otherwise noted.     Objective    Exam  /81   Pulse 66   Temp 98.2  F (36.8  C) (Temporal)   Resp 14   Ht 1.654 m (5' 5.12\")   Wt 72.6 kg (160 lb)   LMP 01/19/2023   SpO2 99%   BMI 26.53 kg/m     Estimated body mass index is 26.53 kg/m  as calculated from " "the following:    Height as of this encounter: 1.654 m (5' 5.12\").    Weight as of this encounter: 72.6 kg (160 lb).    Physical Exam  GENERAL: alert and no distress  RESP: lungs clear to auscultation - no rales, rhonchi or wheezes  CV: regular rate and rhythm, normal S1 S2, no S3 or S4, no murmur, click or rub, no peripheral edema  ABDOMEN: soft, nontender, no hepatosplenomegaly, no masses and bowel sounds normal  MS: no gross musculoskeletal defects noted, no edema  SKIN: hair has about 2 inch length fuzzy growth throughout but no scalp issues or rashes. Not easily broken for what is left  NEURO: Normal strength and tone, mentation intact and speech normal  PSYCH: mentation appears normal, affect normal/bright        Signed Electronically by: Rosetta Nicole MD, MD    "

## 2025-05-15 ENCOUNTER — RESULTS FOLLOW-UP (OUTPATIENT)
Dept: FAMILY MEDICINE | Facility: OTHER | Age: 45
End: 2025-05-15

## 2025-08-05 ENCOUNTER — MYC MEDICAL ADVICE (OUTPATIENT)
Dept: FAMILY MEDICINE | Facility: OTHER | Age: 45
End: 2025-08-05
Payer: COMMERCIAL

## (undated) DEVICE — SYR EAR BULB 3OZ 0035830

## (undated) DEVICE — SU VICRYL 2-0 SH 27" UND J417H

## (undated) DEVICE — NDL 25GA 1.5" 305127

## (undated) DEVICE — SOL NACL 0.9% INJ 1000ML BAG 2B1324X

## (undated) DEVICE — NDL ECLIPSE 18GA 1.5"

## (undated) DEVICE — RETR ELASTIC STAYS LONE STAR SHARP 5MM 8/PACK 3311-8G

## (undated) DEVICE — RETR RING LONE STAR 28.3X18.3CM W/CATH CLIPSX2 3308G

## (undated) DEVICE — DRAPE UNDER BUTTOCK 8483

## (undated) DEVICE — SYR 05ML LL W/O NDL

## (undated) DEVICE — TRAY PROCEDURE SUPPORT PAIN MANAGEMENT 332114

## (undated) DEVICE — NDL SPINAL 22GA 5" QUINCKE 405148

## (undated) DEVICE — GLOVE PROTEXIS W/NEU-THERA 7.5  2D73TE75

## (undated) DEVICE — CATH FOLEY 18FR 5ML SILICONE LUBRI-SIL 175818

## (undated) DEVICE — PAD CHUX UNDERPAD 30X30"

## (undated) DEVICE — SYR 10ML LL W/O NDL

## (undated) DEVICE — LINEN TOWEL PACK X5 5464

## (undated) DEVICE — PACK CYSTO CUSTOM ASC

## (undated) DEVICE — PREP CHLORAPREP 26ML TINTED ORANGE  260815

## (undated) DEVICE — DRSG BANDAID 1X3" FABRIC

## (undated) DEVICE — COVER CAMERA IN-LIGHT DISP LT-C02

## (undated) DEVICE — TUBING SUCTION MEDI-VAC 1/4"X20' N620A

## (undated) DEVICE — GLOVE PROTEXIS BLUE W/NEU-THERA 7.5  2D73EB75

## (undated) DEVICE — LIGHT HANDLE X1 31140133

## (undated) DEVICE — SOL NACL 0.9% IRRIG 500ML BOTTLE 2F7123

## (undated) DEVICE — TUBING IV EXTENSION SET 34"

## (undated) DEVICE — GLOVE PROTEXIS MICRO 7.0  2D73PM70

## (undated) DEVICE — SUCTION MANIFOLD NEPTUNE 2 SYS 4 PORT 0702-020-000

## (undated) DEVICE — NDL COUNTER 20CT 31142493

## (undated) RX ORDER — DEXAMETHASONE SODIUM PHOSPHATE 4 MG/ML
INJECTION, SOLUTION INTRA-ARTICULAR; INTRALESIONAL; INTRAMUSCULAR; INTRAVENOUS; SOFT TISSUE
Status: DISPENSED
Start: 2022-02-15

## (undated) RX ORDER — FENTANYL CITRATE 50 UG/ML
INJECTION, SOLUTION INTRAMUSCULAR; INTRAVENOUS
Status: DISPENSED
Start: 2022-02-15

## (undated) RX ORDER — EPINEPHRINE 1 MG/ML
INJECTION, SOLUTION, CONCENTRATE INTRAVENOUS
Status: DISPENSED
Start: 2022-02-15

## (undated) RX ORDER — ONDANSETRON 2 MG/ML
INJECTION INTRAMUSCULAR; INTRAVENOUS
Status: DISPENSED
Start: 2022-02-15

## (undated) RX ORDER — BUPIVACAINE HYDROCHLORIDE 2.5 MG/ML
INJECTION, SOLUTION INFILTRATION; PERINEURAL
Status: DISPENSED
Start: 2022-02-15

## (undated) RX ORDER — PROPOFOL 10 MG/ML
INJECTION, EMULSION INTRAVENOUS
Status: DISPENSED
Start: 2022-02-15

## (undated) RX ORDER — GLYCOPYRROLATE 0.2 MG/ML
INJECTION INTRAMUSCULAR; INTRAVENOUS
Status: DISPENSED
Start: 2022-02-15

## (undated) RX ORDER — SCOLOPAMINE TRANSDERMAL SYSTEM 1 MG/1
PATCH, EXTENDED RELEASE TRANSDERMAL
Status: DISPENSED
Start: 2022-02-15

## (undated) RX ORDER — CEFAZOLIN SODIUM 2 G/50ML
SOLUTION INTRAVENOUS
Status: DISPENSED
Start: 2022-02-15

## (undated) RX ORDER — ACETAMINOPHEN 325 MG/1
TABLET ORAL
Status: DISPENSED
Start: 2022-02-15

## (undated) RX ORDER — OXYCODONE HYDROCHLORIDE 5 MG/1
TABLET ORAL
Status: DISPENSED
Start: 2022-02-15